# Patient Record
Sex: FEMALE | Race: WHITE | NOT HISPANIC OR LATINO | Employment: OTHER | URBAN - METROPOLITAN AREA
[De-identification: names, ages, dates, MRNs, and addresses within clinical notes are randomized per-mention and may not be internally consistent; named-entity substitution may affect disease eponyms.]

---

## 2019-04-01 ENCOUNTER — SURGERY - HEALTHEAST (OUTPATIENT)
Dept: CARDIOLOGY | Facility: CLINIC | Age: 64
End: 2019-04-01

## 2019-04-01 ASSESSMENT — MIFFLIN-ST. JEOR
SCORE: 1111.95
SCORE: 1111.95

## 2019-04-02 ENCOUNTER — SURGERY - HEALTHEAST (OUTPATIENT)
Dept: CARDIOLOGY | Facility: CLINIC | Age: 64
End: 2019-04-02

## 2019-04-10 ENCOUNTER — AMBULATORY - HEALTHEAST (OUTPATIENT)
Dept: CARDIAC REHAB | Facility: CLINIC | Age: 64
End: 2019-04-10

## 2019-04-10 DIAGNOSIS — Z95.5 S/P CORONARY ARTERY STENT PLACEMENT: ICD-10-CM

## 2019-04-11 ENCOUNTER — COMMUNICATION - HEALTHEAST (OUTPATIENT)
Dept: VASCULAR SURGERY | Facility: CLINIC | Age: 64
End: 2019-04-11

## 2019-04-12 ENCOUNTER — AMBULATORY - HEALTHEAST (OUTPATIENT)
Dept: CARDIAC REHAB | Facility: CLINIC | Age: 64
End: 2019-04-12

## 2019-04-12 DIAGNOSIS — Z95.5 S/P CORONARY ARTERY STENT PLACEMENT: ICD-10-CM

## 2019-04-15 ENCOUNTER — AMBULATORY - HEALTHEAST (OUTPATIENT)
Dept: CARDIAC REHAB | Facility: CLINIC | Age: 64
End: 2019-04-15

## 2019-04-15 DIAGNOSIS — Z95.5 S/P CORONARY ARTERY STENT PLACEMENT: ICD-10-CM

## 2019-04-16 ENCOUNTER — OFFICE VISIT - HEALTHEAST (OUTPATIENT)
Dept: CARDIOLOGY | Facility: CLINIC | Age: 64
End: 2019-04-16

## 2019-04-16 DIAGNOSIS — I21.4 NSTEMI (NON-ST ELEVATED MYOCARDIAL INFARCTION) (H): ICD-10-CM

## 2019-04-16 DIAGNOSIS — E78.00 HYPERCHOLESTEREMIA: ICD-10-CM

## 2019-04-16 DIAGNOSIS — I10 ESSENTIAL HYPERTENSION: ICD-10-CM

## 2019-04-16 DIAGNOSIS — I25.10 CORONARY ARTERY DISEASE DUE TO LIPID RICH PLAQUE: ICD-10-CM

## 2019-04-16 DIAGNOSIS — I25.83 CORONARY ARTERY DISEASE DUE TO LIPID RICH PLAQUE: ICD-10-CM

## 2019-04-16 ASSESSMENT — MIFFLIN-ST. JEOR: SCORE: 1084.74

## 2019-04-17 ENCOUNTER — COMMUNICATION - HEALTHEAST (OUTPATIENT)
Dept: CARDIOLOGY | Facility: CLINIC | Age: 64
End: 2019-04-17

## 2019-04-18 ENCOUNTER — COMMUNICATION - HEALTHEAST (OUTPATIENT)
Dept: VASCULAR SURGERY | Facility: CLINIC | Age: 64
End: 2019-04-18

## 2019-04-22 ENCOUNTER — AMBULATORY - HEALTHEAST (OUTPATIENT)
Dept: CARDIAC REHAB | Facility: CLINIC | Age: 64
End: 2019-04-22

## 2019-04-22 DIAGNOSIS — Z95.5 S/P CORONARY ARTERY STENT PLACEMENT: ICD-10-CM

## 2019-04-26 ENCOUNTER — AMBULATORY - HEALTHEAST (OUTPATIENT)
Dept: CARDIAC REHAB | Facility: CLINIC | Age: 64
End: 2019-04-26

## 2019-04-26 DIAGNOSIS — Z95.5 S/P CORONARY ARTERY STENT PLACEMENT: ICD-10-CM

## 2019-04-29 ENCOUNTER — COMMUNICATION - HEALTHEAST (OUTPATIENT)
Dept: CARDIOLOGY | Facility: CLINIC | Age: 64
End: 2019-04-29

## 2019-04-29 DIAGNOSIS — E78.00 HYPERCHOLESTEREMIA: ICD-10-CM

## 2019-04-29 DIAGNOSIS — I25.10 CORONARY ARTERY DISEASE DUE TO LIPID RICH PLAQUE: ICD-10-CM

## 2019-04-29 DIAGNOSIS — I21.4 NSTEMI (NON-ST ELEVATED MYOCARDIAL INFARCTION) (H): ICD-10-CM

## 2019-04-29 DIAGNOSIS — I25.83 CORONARY ARTERY DISEASE DUE TO LIPID RICH PLAQUE: ICD-10-CM

## 2019-04-30 ENCOUNTER — COMMUNICATION - HEALTHEAST (OUTPATIENT)
Dept: CARDIOLOGY | Facility: CLINIC | Age: 64
End: 2019-04-30

## 2019-05-03 ENCOUNTER — AMBULATORY - HEALTHEAST (OUTPATIENT)
Dept: CARDIAC REHAB | Facility: CLINIC | Age: 64
End: 2019-05-03

## 2019-05-03 DIAGNOSIS — Z95.5 S/P CORONARY ARTERY STENT PLACEMENT: ICD-10-CM

## 2019-05-06 ENCOUNTER — AMBULATORY - HEALTHEAST (OUTPATIENT)
Dept: CARDIAC REHAB | Facility: CLINIC | Age: 64
End: 2019-05-06

## 2019-05-06 DIAGNOSIS — Z95.5 S/P CORONARY ARTERY STENT PLACEMENT: ICD-10-CM

## 2019-05-09 ENCOUNTER — OFFICE VISIT - HEALTHEAST (OUTPATIENT)
Dept: CARDIOLOGY | Facility: CLINIC | Age: 64
End: 2019-05-09

## 2019-05-09 DIAGNOSIS — I77.9 CAROTID DISEASE, BILATERAL (H): ICD-10-CM

## 2019-05-09 DIAGNOSIS — I25.10 CORONARY ARTERY DISEASE INVOLVING NATIVE CORONARY ARTERY OF NATIVE HEART WITHOUT ANGINA PECTORIS: ICD-10-CM

## 2019-05-09 ASSESSMENT — MIFFLIN-ST. JEOR: SCORE: 1093.81

## 2019-05-10 ENCOUNTER — AMBULATORY - HEALTHEAST (OUTPATIENT)
Dept: CARDIAC REHAB | Facility: CLINIC | Age: 64
End: 2019-05-10

## 2019-05-10 ENCOUNTER — RECORDS - HEALTHEAST (OUTPATIENT)
Dept: ADMINISTRATIVE | Facility: OTHER | Age: 64
End: 2019-05-10

## 2019-05-10 DIAGNOSIS — Z95.5 S/P CORONARY ARTERY STENT PLACEMENT: ICD-10-CM

## 2019-05-13 ENCOUNTER — AMBULATORY - HEALTHEAST (OUTPATIENT)
Dept: CARDIAC REHAB | Facility: CLINIC | Age: 64
End: 2019-05-13

## 2019-05-13 DIAGNOSIS — Z95.5 S/P CORONARY ARTERY STENT PLACEMENT: ICD-10-CM

## 2019-05-15 ENCOUNTER — COMMUNICATION - HEALTHEAST (OUTPATIENT)
Dept: CARDIOLOGY | Facility: CLINIC | Age: 64
End: 2019-05-15

## 2019-05-17 ENCOUNTER — COMMUNICATION - HEALTHEAST (OUTPATIENT)
Dept: CARDIOLOGY | Facility: CLINIC | Age: 64
End: 2019-05-17

## 2019-05-17 ENCOUNTER — AMBULATORY - HEALTHEAST (OUTPATIENT)
Dept: CARDIAC REHAB | Facility: CLINIC | Age: 64
End: 2019-05-17

## 2019-05-17 DIAGNOSIS — Z95.5 S/P CORONARY ARTERY STENT PLACEMENT: ICD-10-CM

## 2019-05-20 ENCOUNTER — COMMUNICATION - HEALTHEAST (OUTPATIENT)
Dept: CARDIOLOGY | Facility: CLINIC | Age: 64
End: 2019-05-20

## 2019-05-22 ENCOUNTER — COMMUNICATION - HEALTHEAST (OUTPATIENT)
Dept: SCHEDULING | Facility: CLINIC | Age: 64
End: 2019-05-22

## 2019-05-22 ENCOUNTER — AMBULATORY - HEALTHEAST (OUTPATIENT)
Dept: CARDIAC REHAB | Facility: CLINIC | Age: 64
End: 2019-05-22

## 2019-05-22 ENCOUNTER — COMMUNICATION - HEALTHEAST (OUTPATIENT)
Dept: CARDIOLOGY | Facility: CLINIC | Age: 64
End: 2019-05-22

## 2019-05-22 DIAGNOSIS — Z95.5 S/P CORONARY ARTERY STENT PLACEMENT: ICD-10-CM

## 2019-05-24 ENCOUNTER — HOSPITAL ENCOUNTER (OUTPATIENT)
Dept: CT IMAGING | Facility: CLINIC | Age: 64
Discharge: HOME | End: 2019-05-24
Attending: INTERNAL MEDICINE

## 2019-05-24 LAB
CREAT BLD-MCNC: 0.7 MG/DL (ref 0.6–1.1)
GFR SERPL CREATININE-BSD FRML MDRD: >60 ML/MIN/1.73M2

## 2019-05-31 ENCOUNTER — AMBULATORY - HEALTHEAST (OUTPATIENT)
Dept: CARDIAC REHAB | Facility: CLINIC | Age: 64
End: 2019-05-31

## 2019-05-31 DIAGNOSIS — Z95.5 S/P CORONARY ARTERY STENT PLACEMENT: ICD-10-CM

## 2019-06-03 ENCOUNTER — AMBULATORY - HEALTHEAST (OUTPATIENT)
Dept: CARDIAC REHAB | Facility: CLINIC | Age: 64
End: 2019-06-03

## 2019-06-03 DIAGNOSIS — Z95.5 S/P CORONARY ARTERY STENT PLACEMENT: ICD-10-CM

## 2019-06-07 ENCOUNTER — AMBULATORY - HEALTHEAST (OUTPATIENT)
Dept: CARDIAC REHAB | Facility: CLINIC | Age: 64
End: 2019-06-07

## 2019-06-07 DIAGNOSIS — Z95.5 S/P CORONARY ARTERY STENT PLACEMENT: ICD-10-CM

## 2019-06-10 ENCOUNTER — AMBULATORY - HEALTHEAST (OUTPATIENT)
Dept: CARDIAC REHAB | Facility: CLINIC | Age: 64
End: 2019-06-10

## 2019-06-10 DIAGNOSIS — Z95.5 S/P CORONARY ARTERY STENT PLACEMENT: ICD-10-CM

## 2019-06-12 ENCOUNTER — AMBULATORY - HEALTHEAST (OUTPATIENT)
Dept: CARDIAC REHAB | Facility: CLINIC | Age: 64
End: 2019-06-12

## 2019-06-12 DIAGNOSIS — Z95.5 S/P CORONARY ARTERY STENT PLACEMENT: ICD-10-CM

## 2019-06-19 ENCOUNTER — AMBULATORY - HEALTHEAST (OUTPATIENT)
Dept: CARDIAC REHAB | Facility: CLINIC | Age: 64
End: 2019-06-19

## 2019-06-19 DIAGNOSIS — Z95.5 S/P CORONARY ARTERY STENT PLACEMENT: ICD-10-CM

## 2019-06-24 ENCOUNTER — AMBULATORY - HEALTHEAST (OUTPATIENT)
Dept: CARDIAC REHAB | Facility: CLINIC | Age: 64
End: 2019-06-24

## 2019-06-24 DIAGNOSIS — Z95.5 S/P CORONARY ARTERY STENT PLACEMENT: ICD-10-CM

## 2019-06-26 ENCOUNTER — AMBULATORY - HEALTHEAST (OUTPATIENT)
Dept: CARDIAC REHAB | Facility: CLINIC | Age: 64
End: 2019-06-26

## 2019-06-26 DIAGNOSIS — Z95.5 S/P CORONARY ARTERY STENT PLACEMENT: ICD-10-CM

## 2019-07-01 ENCOUNTER — AMBULATORY - HEALTHEAST (OUTPATIENT)
Dept: CARDIAC REHAB | Facility: CLINIC | Age: 64
End: 2019-07-01

## 2019-07-01 DIAGNOSIS — Z95.5 S/P CORONARY ARTERY STENT PLACEMENT: ICD-10-CM

## 2019-07-08 ENCOUNTER — AMBULATORY - HEALTHEAST (OUTPATIENT)
Dept: CARDIAC REHAB | Facility: CLINIC | Age: 64
End: 2019-07-08

## 2019-07-08 DIAGNOSIS — Z95.5 S/P CORONARY ARTERY STENT PLACEMENT: ICD-10-CM

## 2019-07-10 ENCOUNTER — AMBULATORY - HEALTHEAST (OUTPATIENT)
Dept: CARDIAC REHAB | Facility: CLINIC | Age: 64
End: 2019-07-10

## 2019-07-10 DIAGNOSIS — Z95.5 S/P CORONARY ARTERY STENT PLACEMENT: ICD-10-CM

## 2019-07-16 ENCOUNTER — AMBULATORY - HEALTHEAST (OUTPATIENT)
Dept: CARDIAC REHAB | Facility: CLINIC | Age: 64
End: 2019-07-16

## 2019-07-16 DIAGNOSIS — Z95.5 S/P CORONARY ARTERY STENT PLACEMENT: ICD-10-CM

## 2019-07-17 ENCOUNTER — AMBULATORY - HEALTHEAST (OUTPATIENT)
Dept: CARDIAC REHAB | Facility: CLINIC | Age: 64
End: 2019-07-17

## 2019-07-17 DIAGNOSIS — Z95.5 S/P CORONARY ARTERY STENT PLACEMENT: ICD-10-CM

## 2019-07-18 ENCOUNTER — OFFICE VISIT - HEALTHEAST (OUTPATIENT)
Dept: CARDIOLOGY | Facility: CLINIC | Age: 64
End: 2019-07-18

## 2019-07-18 ENCOUNTER — COMMUNICATION - HEALTHEAST (OUTPATIENT)
Dept: CARDIOLOGY | Facility: CLINIC | Age: 64
End: 2019-07-18

## 2019-07-18 ENCOUNTER — AMBULATORY - HEALTHEAST (OUTPATIENT)
Dept: CARDIAC REHAB | Facility: CLINIC | Age: 64
End: 2019-07-18

## 2019-07-18 DIAGNOSIS — I25.10 CORONARY ARTERY DISEASE INVOLVING NATIVE CORONARY ARTERY OF NATIVE HEART WITHOUT ANGINA PECTORIS: ICD-10-CM

## 2019-07-18 DIAGNOSIS — Z95.5 S/P CORONARY ARTERY STENT PLACEMENT: ICD-10-CM

## 2019-07-18 ASSESSMENT — MIFFLIN-ST. JEOR: SCORE: 1084.74

## 2019-07-23 ENCOUNTER — AMBULATORY - HEALTHEAST (OUTPATIENT)
Dept: CARDIAC REHAB | Facility: CLINIC | Age: 64
End: 2019-07-23

## 2019-07-23 DIAGNOSIS — Z95.5 S/P CORONARY ARTERY STENT PLACEMENT: ICD-10-CM

## 2019-07-24 ENCOUNTER — AMBULATORY - HEALTHEAST (OUTPATIENT)
Dept: CARDIAC REHAB | Facility: CLINIC | Age: 64
End: 2019-07-24

## 2019-07-24 DIAGNOSIS — Z95.5 S/P CORONARY ARTERY STENT PLACEMENT: ICD-10-CM

## 2019-07-25 ENCOUNTER — AMBULATORY - HEALTHEAST (OUTPATIENT)
Dept: CARDIAC REHAB | Facility: CLINIC | Age: 64
End: 2019-07-25

## 2019-07-25 DIAGNOSIS — Z95.5 S/P CORONARY ARTERY STENT PLACEMENT: ICD-10-CM

## 2019-07-26 ENCOUNTER — COMMUNICATION - HEALTHEAST (OUTPATIENT)
Dept: CARDIOLOGY | Facility: CLINIC | Age: 64
End: 2019-07-26

## 2019-07-26 DIAGNOSIS — I25.10 CORONARY ARTERY DISEASE INVOLVING NATIVE CORONARY ARTERY OF NATIVE HEART WITHOUT ANGINA PECTORIS: ICD-10-CM

## 2019-08-06 ENCOUNTER — AMBULATORY - HEALTHEAST (OUTPATIENT)
Dept: CARDIAC REHAB | Facility: CLINIC | Age: 64
End: 2019-08-06

## 2019-08-06 DIAGNOSIS — Z95.5 S/P CORONARY ARTERY STENT PLACEMENT: ICD-10-CM

## 2019-08-07 ENCOUNTER — AMBULATORY - HEALTHEAST (OUTPATIENT)
Dept: CARDIAC REHAB | Facility: CLINIC | Age: 64
End: 2019-08-07

## 2019-08-07 DIAGNOSIS — Z95.5 S/P CORONARY ARTERY STENT PLACEMENT: ICD-10-CM

## 2019-08-08 ENCOUNTER — AMBULATORY - HEALTHEAST (OUTPATIENT)
Dept: CARDIAC REHAB | Facility: CLINIC | Age: 64
End: 2019-08-08

## 2019-08-08 DIAGNOSIS — Z95.5 S/P CORONARY ARTERY STENT PLACEMENT: ICD-10-CM

## 2019-08-22 ENCOUNTER — SURGERY - HEALTHEAST (OUTPATIENT)
Dept: CARDIOLOGY | Facility: CLINIC | Age: 64
End: 2019-08-22

## 2019-08-23 ENCOUNTER — SURGERY - HEALTHEAST (OUTPATIENT)
Dept: CARDIOLOGY | Facility: CLINIC | Age: 64
End: 2019-08-23

## 2019-08-23 ENCOUNTER — ANESTHESIA - HEALTHEAST (OUTPATIENT)
Dept: SURGERY | Facility: CLINIC | Age: 64
End: 2019-08-23

## 2019-08-23 DIAGNOSIS — Z01.818 PRE-OP EXAM: ICD-10-CM

## 2019-08-23 ASSESSMENT — MIFFLIN-ST. JEOR
SCORE: 1073.4
SCORE: 1073.4

## 2019-08-24 ASSESSMENT — MIFFLIN-ST. JEOR
SCORE: 1072.49
SCORE: 1072.49

## 2019-08-25 ENCOUNTER — SURGERY - HEALTHEAST (OUTPATIENT)
Dept: CARDIOLOGY | Facility: CLINIC | Age: 64
End: 2019-08-25

## 2019-08-25 DIAGNOSIS — Z01.818 PRE-OP EVALUATION: ICD-10-CM

## 2019-08-25 ASSESSMENT — MIFFLIN-ST. JEOR
SCORE: 1072.04
SCORE: 1072.04

## 2019-08-26 ENCOUNTER — SURGERY - HEALTHEAST (OUTPATIENT)
Dept: SURGERY | Facility: CLINIC | Age: 64
End: 2019-08-26

## 2019-08-26 ASSESSMENT — MIFFLIN-ST. JEOR
SCORE: 1066.14
SCORE: 1066.14

## 2019-08-27 ASSESSMENT — MIFFLIN-ST. JEOR
SCORE: 1101.07
SCORE: 1101.07

## 2019-08-28 ASSESSMENT — MIFFLIN-ST. JEOR
SCORE: 1096.08
SCORE: 1096.08

## 2019-08-29 ASSESSMENT — MIFFLIN-ST. JEOR
SCORE: 1084.28
SCORE: 1084.28

## 2019-08-30 ASSESSMENT — MIFFLIN-ST. JEOR
SCORE: 1078.39
SCORE: 1078.39

## 2019-09-05 ENCOUNTER — COMMUNICATION - HEALTHEAST (OUTPATIENT)
Dept: CARDIOLOGY | Facility: CLINIC | Age: 64
End: 2019-09-05

## 2019-09-11 ENCOUNTER — COMMUNICATION - HEALTHEAST (OUTPATIENT)
Dept: CARDIOLOGY | Facility: CLINIC | Age: 64
End: 2019-09-11

## 2019-09-17 ENCOUNTER — OFFICE VISIT - HEALTHEAST (OUTPATIENT)
Dept: CARDIOLOGY | Facility: CLINIC | Age: 64
End: 2019-09-17

## 2019-09-17 DIAGNOSIS — Z95.1 S/P CORONARY ARTERY BYPASS GRAFT X 3: ICD-10-CM

## 2019-09-17 ASSESSMENT — MIFFLIN-ST. JEOR: SCORE: 1061.6

## 2019-09-18 ENCOUNTER — COMMUNICATION - HEALTHEAST (OUTPATIENT)
Dept: CARDIOLOGY | Facility: CLINIC | Age: 64
End: 2019-09-18

## 2019-09-19 ENCOUNTER — COMMUNICATION - HEALTHEAST (OUTPATIENT)
Dept: CARDIOLOGY | Facility: CLINIC | Age: 64
End: 2019-09-19

## 2019-09-21 ENCOUNTER — COMMUNICATION - HEALTHEAST (OUTPATIENT)
Dept: CARDIOLOGY | Facility: CLINIC | Age: 64
End: 2019-09-21

## 2019-09-24 ENCOUNTER — COMMUNICATION - HEALTHEAST (OUTPATIENT)
Dept: CARDIOLOGY | Facility: CLINIC | Age: 64
End: 2019-09-24

## 2019-09-25 ENCOUNTER — COMMUNICATION - HEALTHEAST (OUTPATIENT)
Dept: CARDIOLOGY | Facility: CLINIC | Age: 64
End: 2019-09-25

## 2019-09-25 ENCOUNTER — AMBULATORY - HEALTHEAST (OUTPATIENT)
Dept: CARDIAC REHAB | Facility: CLINIC | Age: 64
End: 2019-09-25

## 2019-09-25 DIAGNOSIS — Z95.1 S/P CABG (CORONARY ARTERY BYPASS GRAFT): ICD-10-CM

## 2019-09-30 ENCOUNTER — AMBULATORY - HEALTHEAST (OUTPATIENT)
Dept: CARDIAC REHAB | Facility: CLINIC | Age: 64
End: 2019-09-30

## 2019-09-30 DIAGNOSIS — Z95.1 S/P CABG (CORONARY ARTERY BYPASS GRAFT): ICD-10-CM

## 2019-10-04 ENCOUNTER — OFFICE VISIT - HEALTHEAST (OUTPATIENT)
Dept: CARDIOLOGY | Facility: CLINIC | Age: 64
End: 2019-10-04

## 2019-10-04 ENCOUNTER — AMBULATORY - HEALTHEAST (OUTPATIENT)
Dept: CARDIAC REHAB | Facility: CLINIC | Age: 64
End: 2019-10-04

## 2019-10-04 DIAGNOSIS — Z95.1 S/P CABG (CORONARY ARTERY BYPASS GRAFT): ICD-10-CM

## 2019-10-04 DIAGNOSIS — I25.10 CORONARY ARTERY DISEASE INVOLVING NATIVE CORONARY ARTERY OF NATIVE HEART WITHOUT ANGINA PECTORIS: ICD-10-CM

## 2019-10-04 DIAGNOSIS — Z95.1 S/P CORONARY ARTERY BYPASS GRAFT X 3: ICD-10-CM

## 2019-10-04 ASSESSMENT — MIFFLIN-ST. JEOR: SCORE: 1071.13

## 2019-10-08 ENCOUNTER — COMMUNICATION - HEALTHEAST (OUTPATIENT)
Dept: CARDIOLOGY | Facility: CLINIC | Age: 64
End: 2019-10-08

## 2019-10-08 ENCOUNTER — AMBULATORY - HEALTHEAST (OUTPATIENT)
Dept: CARDIAC REHAB | Facility: CLINIC | Age: 64
End: 2019-10-08

## 2019-10-08 DIAGNOSIS — I21.4 NSTEMI (NON-ST ELEVATED MYOCARDIAL INFARCTION) (H): ICD-10-CM

## 2019-10-08 DIAGNOSIS — Z95.1 S/P CABG (CORONARY ARTERY BYPASS GRAFT): ICD-10-CM

## 2019-10-15 ENCOUNTER — AMBULATORY - HEALTHEAST (OUTPATIENT)
Dept: CARDIOLOGY | Facility: CLINIC | Age: 64
End: 2019-10-15

## 2019-10-15 ENCOUNTER — AMBULATORY - HEALTHEAST (OUTPATIENT)
Dept: CARDIAC REHAB | Facility: CLINIC | Age: 64
End: 2019-10-15

## 2019-10-15 DIAGNOSIS — Z95.1 S/P CABG (CORONARY ARTERY BYPASS GRAFT): ICD-10-CM

## 2019-10-15 DIAGNOSIS — I25.10 CORONARY ARTERY DISEASE INVOLVING NATIVE CORONARY ARTERY OF NATIVE HEART WITHOUT ANGINA PECTORIS: ICD-10-CM

## 2019-10-17 ENCOUNTER — AMBULATORY - HEALTHEAST (OUTPATIENT)
Dept: CARDIAC REHAB | Facility: CLINIC | Age: 64
End: 2019-10-17

## 2019-10-17 DIAGNOSIS — I25.10 CAD (CORONARY ARTERY DISEASE): ICD-10-CM

## 2019-10-19 ENCOUNTER — COMMUNICATION - HEALTHEAST (OUTPATIENT)
Dept: CARDIOLOGY | Facility: CLINIC | Age: 64
End: 2019-10-19

## 2019-10-21 ENCOUNTER — AMBULATORY - HEALTHEAST (OUTPATIENT)
Dept: CARDIOLOGY | Facility: CLINIC | Age: 64
End: 2019-10-21

## 2019-10-22 ENCOUNTER — COMMUNICATION - HEALTHEAST (OUTPATIENT)
Dept: CARDIOLOGY | Facility: CLINIC | Age: 64
End: 2019-10-22

## 2019-10-24 ENCOUNTER — COMMUNICATION - HEALTHEAST (OUTPATIENT)
Dept: CARDIOLOGY | Facility: CLINIC | Age: 64
End: 2019-10-24

## 2019-11-05 ENCOUNTER — RECORDS - HEALTHEAST (OUTPATIENT)
Dept: ADMINISTRATIVE | Facility: OTHER | Age: 64
End: 2019-11-05

## 2019-11-26 ENCOUNTER — COMMUNICATION - HEALTHEAST (OUTPATIENT)
Dept: CARDIOLOGY | Facility: CLINIC | Age: 64
End: 2019-11-26

## 2019-11-26 DIAGNOSIS — I21.4 NSTEMI (NON-ST ELEVATED MYOCARDIAL INFARCTION) (H): ICD-10-CM

## 2019-11-26 DIAGNOSIS — I25.83 CORONARY ARTERY DISEASE DUE TO LIPID RICH PLAQUE: ICD-10-CM

## 2019-11-26 DIAGNOSIS — I25.10 CORONARY ARTERY DISEASE DUE TO LIPID RICH PLAQUE: ICD-10-CM

## 2019-12-30 ENCOUNTER — OFFICE VISIT - HEALTHEAST (OUTPATIENT)
Dept: CARDIOLOGY | Facility: CLINIC | Age: 64
End: 2019-12-30

## 2019-12-30 DIAGNOSIS — R09.89 CAROTID BRUIT: ICD-10-CM

## 2019-12-30 ASSESSMENT — MIFFLIN-ST. JEOR: SCORE: 1064.78

## 2020-03-03 ENCOUNTER — COMMUNICATION - HEALTHEAST (OUTPATIENT)
Dept: CARDIOLOGY | Facility: CLINIC | Age: 65
End: 2020-03-03

## 2020-03-15 ENCOUNTER — COMMUNICATION - HEALTHEAST (OUTPATIENT)
Dept: CARDIOLOGY | Facility: CLINIC | Age: 65
End: 2020-03-15

## 2020-04-23 ASSESSMENT — MIFFLIN-ST. JEOR
SCORE: 1079.75
SCORE: 1084.74

## 2020-04-24 ENCOUNTER — SURGERY - HEALTHEAST (OUTPATIENT)
Dept: CARDIOLOGY | Facility: CLINIC | Age: 65
End: 2020-04-24

## 2020-04-24 ASSESSMENT — MIFFLIN-ST. JEOR: SCORE: 1081.56

## 2020-04-25 ASSESSMENT — MIFFLIN-ST. JEOR: SCORE: 1085.19

## 2020-04-26 ENCOUNTER — COMMUNICATION - HEALTHEAST (OUTPATIENT)
Dept: CARDIOLOGY | Facility: CLINIC | Age: 65
End: 2020-04-26

## 2020-04-28 ENCOUNTER — COMMUNICATION - HEALTHEAST (OUTPATIENT)
Dept: PHARMACY | Facility: CLINIC | Age: 65
End: 2020-04-28

## 2020-04-28 ENCOUNTER — AMBULATORY - HEALTHEAST (OUTPATIENT)
Dept: PHARMACY | Facility: CLINIC | Age: 65
End: 2020-04-28

## 2020-04-28 DIAGNOSIS — I25.119 CORONARY ARTERY DISEASE INVOLVING NATIVE CORONARY ARTERY OF NATIVE HEART WITH ANGINA PECTORIS (H): ICD-10-CM

## 2020-04-28 DIAGNOSIS — I25.110 CORONARY ARTERY DISEASE INVOLVING NATIVE CORONARY ARTERY OF NATIVE HEART WITH UNSTABLE ANGINA PECTORIS (H): ICD-10-CM

## 2020-04-28 DIAGNOSIS — E78.00 PURE HYPERCHOLESTEROLEMIA: ICD-10-CM

## 2020-05-04 ENCOUNTER — OFFICE VISIT - HEALTHEAST (OUTPATIENT)
Dept: CARDIOLOGY | Facility: CLINIC | Age: 65
End: 2020-05-04

## 2020-05-04 DIAGNOSIS — I25.10 CORONARY ARTERY DISEASE INVOLVING NATIVE CORONARY ARTERY OF NATIVE HEART WITHOUT ANGINA PECTORIS: ICD-10-CM

## 2020-05-05 ENCOUNTER — COMMUNICATION - HEALTHEAST (OUTPATIENT)
Dept: CARDIOLOGY | Facility: CLINIC | Age: 65
End: 2020-05-05

## 2020-05-06 ENCOUNTER — COMMUNICATION - HEALTHEAST (OUTPATIENT)
Dept: CARDIOLOGY | Facility: CLINIC | Age: 65
End: 2020-05-06

## 2020-05-07 ENCOUNTER — COMMUNICATION - HEALTHEAST (OUTPATIENT)
Dept: CARDIOLOGY | Facility: CLINIC | Age: 65
End: 2020-05-07

## 2020-05-08 ENCOUNTER — COMMUNICATION - HEALTHEAST (OUTPATIENT)
Dept: CARDIOLOGY | Facility: CLINIC | Age: 65
End: 2020-05-08

## 2020-05-18 ENCOUNTER — COMMUNICATION - HEALTHEAST (OUTPATIENT)
Dept: CARDIOLOGY | Facility: CLINIC | Age: 65
End: 2020-05-18

## 2020-05-21 ENCOUNTER — COMMUNICATION - HEALTHEAST (OUTPATIENT)
Dept: CARDIOLOGY | Facility: CLINIC | Age: 65
End: 2020-05-21

## 2020-05-26 ENCOUNTER — COMMUNICATION - HEALTHEAST (OUTPATIENT)
Dept: CARDIOLOGY | Facility: CLINIC | Age: 65
End: 2020-05-26

## 2020-05-27 ENCOUNTER — COMMUNICATION - HEALTHEAST (OUTPATIENT)
Dept: CARDIOLOGY | Facility: CLINIC | Age: 65
End: 2020-05-27

## 2020-05-28 ENCOUNTER — COMMUNICATION - HEALTHEAST (OUTPATIENT)
Dept: CARDIOLOGY | Facility: CLINIC | Age: 65
End: 2020-05-28

## 2020-06-02 ENCOUNTER — OFFICE VISIT - HEALTHEAST (OUTPATIENT)
Dept: CARDIOLOGY | Facility: CLINIC | Age: 65
End: 2020-06-02

## 2020-06-02 DIAGNOSIS — I10 ESSENTIAL HYPERTENSION: ICD-10-CM

## 2020-06-02 DIAGNOSIS — E78.00 PURE HYPERCHOLESTEROLEMIA: ICD-10-CM

## 2020-06-02 DIAGNOSIS — I65.23 BILATERAL CAROTID ARTERY STENOSIS: ICD-10-CM

## 2020-06-02 DIAGNOSIS — I25.119 CORONARY ARTERY DISEASE INVOLVING NATIVE CORONARY ARTERY OF NATIVE HEART WITH ANGINA PECTORIS (H): ICD-10-CM

## 2020-06-03 ENCOUNTER — COMMUNICATION - HEALTHEAST (OUTPATIENT)
Dept: CARDIOLOGY | Facility: CLINIC | Age: 65
End: 2020-06-03

## 2020-06-08 ENCOUNTER — COMMUNICATION - HEALTHEAST (OUTPATIENT)
Dept: CARDIOLOGY | Facility: CLINIC | Age: 65
End: 2020-06-08

## 2020-06-09 ENCOUNTER — COMMUNICATION - HEALTHEAST (OUTPATIENT)
Dept: CARDIOLOGY | Facility: CLINIC | Age: 65
End: 2020-06-09

## 2020-06-09 DIAGNOSIS — I25.119 CORONARY ARTERY DISEASE INVOLVING NATIVE CORONARY ARTERY OF NATIVE HEART WITH ANGINA PECTORIS (H): ICD-10-CM

## 2020-06-09 DIAGNOSIS — I25.10 CORONARY ARTERY DISEASE INVOLVING NATIVE CORONARY ARTERY OF NATIVE HEART WITHOUT ANGINA PECTORIS: ICD-10-CM

## 2020-06-09 DIAGNOSIS — I10 ESSENTIAL HYPERTENSION: ICD-10-CM

## 2020-06-10 ENCOUNTER — COMMUNICATION - HEALTHEAST (OUTPATIENT)
Dept: CARDIOLOGY | Facility: CLINIC | Age: 65
End: 2020-06-10

## 2020-06-11 ENCOUNTER — COMMUNICATION - HEALTHEAST (OUTPATIENT)
Dept: CARDIOLOGY | Facility: CLINIC | Age: 65
End: 2020-06-11

## 2020-06-17 ENCOUNTER — OFFICE VISIT - HEALTHEAST (OUTPATIENT)
Dept: CARDIOLOGY | Facility: CLINIC | Age: 65
End: 2020-06-17

## 2020-06-17 DIAGNOSIS — E78.00 PURE HYPERCHOLESTEROLEMIA: ICD-10-CM

## 2020-06-17 DIAGNOSIS — I10 ESSENTIAL HYPERTENSION: ICD-10-CM

## 2020-06-17 DIAGNOSIS — Z95.1 S/P CABG X 3: ICD-10-CM

## 2020-06-17 DIAGNOSIS — I25.119 CORONARY ARTERY DISEASE INVOLVING NATIVE CORONARY ARTERY OF NATIVE HEART WITH ANGINA PECTORIS (H): ICD-10-CM

## 2020-06-17 DIAGNOSIS — R07.89 ATYPICAL CHEST PAIN: ICD-10-CM

## 2020-06-17 ASSESSMENT — MIFFLIN-ST. JEOR: SCORE: 1077.93

## 2020-06-26 ENCOUNTER — COMMUNICATION - HEALTHEAST (OUTPATIENT)
Dept: CARDIOLOGY | Facility: CLINIC | Age: 65
End: 2020-06-26

## 2020-06-29 ENCOUNTER — COMMUNICATION - HEALTHEAST (OUTPATIENT)
Dept: CARDIOLOGY | Facility: CLINIC | Age: 65
End: 2020-06-29

## 2020-07-02 ENCOUNTER — COMMUNICATION - HEALTHEAST (OUTPATIENT)
Dept: CARDIOLOGY | Facility: CLINIC | Age: 65
End: 2020-07-02

## 2020-07-07 ENCOUNTER — COMMUNICATION - HEALTHEAST (OUTPATIENT)
Dept: CARDIOLOGY | Facility: CLINIC | Age: 65
End: 2020-07-07

## 2020-07-08 ENCOUNTER — OFFICE VISIT - HEALTHEAST (OUTPATIENT)
Dept: CARDIOLOGY | Facility: CLINIC | Age: 65
End: 2020-07-08

## 2020-07-08 DIAGNOSIS — I10 ESSENTIAL HYPERTENSION: ICD-10-CM

## 2020-07-08 DIAGNOSIS — I21.4 NSTEMI (NON-ST ELEVATED MYOCARDIAL INFARCTION) (H): ICD-10-CM

## 2020-07-08 DIAGNOSIS — E78.00 PURE HYPERCHOLESTEROLEMIA: ICD-10-CM

## 2020-07-08 DIAGNOSIS — R68.89 COLD INTOLERANCE: ICD-10-CM

## 2020-07-08 DIAGNOSIS — I25.119 CORONARY ARTERY DISEASE INVOLVING NATIVE CORONARY ARTERY OF NATIVE HEART WITH ANGINA PECTORIS (H): ICD-10-CM

## 2020-07-08 LAB — TSH SERPL DL<=0.005 MIU/L-ACNC: 3.08 UIU/ML (ref 0.3–5)

## 2020-07-08 ASSESSMENT — MIFFLIN-ST. JEOR: SCORE: 1068.67

## 2020-07-09 ENCOUNTER — COMMUNICATION - HEALTHEAST (OUTPATIENT)
Dept: CARDIOLOGY | Facility: CLINIC | Age: 65
End: 2020-07-09

## 2020-07-28 ENCOUNTER — RECORDS - HEALTHEAST (OUTPATIENT)
Dept: ADMINISTRATIVE | Facility: OTHER | Age: 65
End: 2020-07-28

## 2020-08-03 ENCOUNTER — AMBULATORY - HEALTHEAST (OUTPATIENT)
Dept: CARDIAC REHAB | Facility: CLINIC | Age: 65
End: 2020-08-03

## 2020-08-03 DIAGNOSIS — I21.4 NON-ST ELEVATION MI (NSTEMI) (H): ICD-10-CM

## 2020-08-04 ENCOUNTER — RECORDS - HEALTHEAST (OUTPATIENT)
Dept: ADMINISTRATIVE | Facility: OTHER | Age: 65
End: 2020-08-04

## 2020-08-07 ENCOUNTER — HOSPITAL ENCOUNTER (OUTPATIENT)
Dept: MAMMOGRAPHY | Facility: CLINIC | Age: 65
Discharge: HOME | End: 2020-08-07

## 2020-08-07 ENCOUNTER — HOSPITAL ENCOUNTER (OUTPATIENT)
Dept: ULTRASOUND IMAGING | Facility: CLINIC | Age: 65
Discharge: HOME | End: 2020-08-07

## 2020-08-07 DIAGNOSIS — N64.4 BREAST PAIN, LEFT: ICD-10-CM

## 2020-08-11 ENCOUNTER — HOSPITAL ENCOUNTER (OUTPATIENT)
Dept: CT IMAGING | Facility: CLINIC | Age: 65
Discharge: HOME | End: 2020-08-11

## 2020-08-11 DIAGNOSIS — I65.21 CAROTID STENOSIS, ASYMPTOMATIC, RIGHT: ICD-10-CM

## 2020-08-11 LAB
CREAT BLD-MCNC: 0.7 MG/DL (ref 0.6–1.1)
GFR SERPL CREATININE-BSD FRML MDRD: >60 ML/MIN/1.73M2

## 2020-08-15 ENCOUNTER — COMMUNICATION - HEALTHEAST (OUTPATIENT)
Dept: SCHEDULING | Facility: CLINIC | Age: 65
End: 2020-08-15

## 2020-08-31 ENCOUNTER — COMMUNICATION - HEALTHEAST (OUTPATIENT)
Dept: CARDIOLOGY | Facility: CLINIC | Age: 65
End: 2020-08-31

## 2020-08-31 DIAGNOSIS — Z95.1 S/P CORONARY ARTERY BYPASS GRAFT X 3: ICD-10-CM

## 2020-09-25 ENCOUNTER — RECORDS - HEALTHEAST (OUTPATIENT)
Dept: ADMINISTRATIVE | Facility: OTHER | Age: 65
End: 2020-09-25

## 2020-10-06 ENCOUNTER — RECORDS - HEALTHEAST (OUTPATIENT)
Dept: ADMINISTRATIVE | Facility: OTHER | Age: 65
End: 2020-10-06

## 2020-10-15 ENCOUNTER — HOSPITAL ENCOUNTER (OUTPATIENT)
Dept: ULTRASOUND IMAGING | Facility: CLINIC | Age: 65
Discharge: HOME | End: 2020-10-15

## 2020-10-15 DIAGNOSIS — R93.89 ABNORMAL CT SCAN: ICD-10-CM

## 2020-10-15 DIAGNOSIS — R11.0 CHRONIC NAUSEA: ICD-10-CM

## 2020-10-15 DIAGNOSIS — R63.4 WEIGHT LOSS: ICD-10-CM

## 2020-10-23 ENCOUNTER — HOSPITAL ENCOUNTER (OUTPATIENT)
Dept: NUCLEAR MEDICINE | Facility: CLINIC | Age: 65
Discharge: HOME | End: 2020-10-23

## 2020-10-23 DIAGNOSIS — R11.0 CHRONIC NAUSEA: ICD-10-CM

## 2020-10-29 ENCOUNTER — HOSPITAL ENCOUNTER (OUTPATIENT)
Dept: CT IMAGING | Facility: CLINIC | Age: 65
Discharge: HOME | End: 2020-10-29

## 2020-10-29 DIAGNOSIS — R11.0 CHRONIC NAUSEA: ICD-10-CM

## 2020-11-04 ENCOUNTER — TRANSFERRED RECORDS (OUTPATIENT)
Dept: HEALTH INFORMATION MANAGEMENT | Facility: CLINIC | Age: 65
End: 2020-11-04

## 2020-11-17 ENCOUNTER — DOCUMENTATION ONLY (OUTPATIENT)
Dept: CARE COORDINATION | Facility: CLINIC | Age: 65
End: 2020-11-17

## 2020-11-17 NOTE — TELEPHONE ENCOUNTER
RECORDS RECEIVED FROM: Self   Date of Appt: 12/11/20   NOTES (FOR ALL VISITS) STATUS DETAILS   OFFICE NOTE from referring provider N/A    OFFICE NOTE from other specialist Received  Dr Nesha Ace @ [facility]:  11/4/20    Dr Sai Wood @ [facility]:  9/24/20   DISCHARGE SUMMARY from hospital N/A    DISCHARGE REPORT from the ER N/A    OPERATIVE REPORT N/A    MEDICATION LIST Care Everywhere    IMAGING  (FOR ALL VISITS)     EMG N/A    EEG N/A    LUMBAR PUNCTURE N/A    GENESIS SCAN N/A    DEXA SCAN *NEUROSURGERY* N/A    ULTRASOUND (CAROTID BILAT) *VASCULAR* N/A    MRI (HEAD, NECK, SPINE) N/A    XRAY (SPINE) *NEUROSURGERY* N/A    CT (HEAD, NECK, SPINE) Received [facility]:  CT Head 10/29/20  CTA Head Neck 8/11/20      Action 11/17/20 MV 10.13am   Action Taken Imaging request faxed to [facility] for:  CT Head 10/29/20     Imaging Received  11/18/20 MV 11.41am  HE   Image Type (x): Disc:   PACS: x   Exam Date/Name CT Head 10/29/20 Comments: images resolved in PACS     Phone Call:  11/18/20 MV 11.44am   Contact Name Carolinacindy Marinelli   Pema Called and spoke with patient regarding outside records. Patient stated she has been seen at [facility]. No imaging done at [facility], just office visits.     Action 11/18/20 MV 11.44am   Action Taken Records request faxed to [facility]     Action 11.22.20 MJ   Action Taken Called [facility]- not open. Sent fax request.     Action 11/23/20 MV 7.23am   Action Taken Records received from [facility] via fax. Sent to scanning

## 2020-11-19 NOTE — PROGRESS NOTES
"Department of Neurology  Movement Disorders Division     Patient: Aury Marinelli   MRN: [id number]   : 1955   Date of Visit: 2020     Dear Colleague,     Thank you for referring your patient, Ms. Marinelli, to the [facility] NEUROLOGY at [facility]. Please see a copy of my visit note below.    Reason for visit: tremor   Referring Physician: self referral     History of Present Illness  Ms. Marinelli is a 65 year old R handed female that presents to Neurology Movement clinic as a new patient for evaluation of tremor.     History obtained from patient. Patient reports that tremors began shortly after she was diagnosed with Non-ST elevation myocardial infarction and is status post coronary artery bypass grafting on 2019 with LIMA to LAD, SVG to diagonal, SVG to OM.  Then she was found to have a blocked graft and is status post PCI of LAD and OM on 2019.  She reports that after the MI and procedures in 2019, she began to notice subtle shaking again in her right leg and head then progressed to her hands.  His tremors worsened after patient was started and stopped on several pressure medications including nitrates, diltiazem, lisinopril, Coreg, carvedilol, bisoprolol, propranolol.  Each of the listed medications were stopped due to an adverse effect, mainly nausea and intolerance to the medication.  Of note, she reported some improvement of her tremor while being on propranolol.  After stopping propranolol, tremors worsened.  When the tremors are bad the shaking is \"everywhere\".  Tremors occur with action, no tremors at rest.  She denies tremors at rest.  Only does she have shaking, she also experiences persistent nausea and autonomic effects such as sweating, diaphoresis and palpitations.  She notices the autonomic symptoms are associated with BMs.  Reports feeling nauseated beginning at the end of the day and persisting throughout the day.  She has " developed anxiety due to the poorly controlled shaking.  She states that when her tremors are poorly controlled she begins feeling anxious which then contributes to worsening tremor.  While at rest/laying down, tremors improve and resolve, except for head tremor which minimally persists.  The tremors and nausea improve around 6-10 PM every evening. She also notices fatigue that interferes with her activities.     TREMOR DISABILITY:    Tremor ADL Scale  1. Speaking 1   2. Feeding with a spoon 1   3. Drinking from a glass 1   4. Hygiene 1   5. Dressing 1   6. Pouring 1   7. Carrying food trays, plates or similar items 1   8. Using keys 0   9. Writing 1   10. Working 1(fatigue bothers patient the most)   11. Overall disability with the most affected task 1   Name of most affected task writing   12. Social impact 1   ADL TOTAL 11       Exacerbating factors: anxiety, BMs    Relieving factors: ativan, laying down   Does alcohol relieve symptoms: does not drink   Family Hx of tremor: sister with ET in head (does not take meds), mother's hands shook but was never officially diagnosed  Current treatment for tremors: none  Previous treatment for tremors: She was on propranolol for BP, 10 mg bid, with some improvement in tremor but she could not tolerate this med due to nausea.   Associated symptoms: Speech slows down, tremor in speech  Change in handwriting: just a little sloppy but no micrographia   Resting tremor: none  Bradykinesia: No, but feels fatigued.  Rigidity: Denies. Back aches when tremors are bad. Stiff neck when head shakes badly/consistently.  Gait problem: None. May feel dizzy when getting up.  Falls: No falls.   Numbness/Tingling: No.  Dystonia: No.   Pain: No.   Memory problems: No.  Mood issues: Anxiety.   Sleep issues/Dream enactment: No dream enactment.   Sense of smell: No.  Constipation: BM every other day to twice a day. On Benefiber and a stool softener.   Started Ensure clear x 10 days. Has lost 10  lbs since May due to nausea with decreased oral input.      Review of Systems:  Other than that mentioned above, the remainder of 12 systems reviewed were negative.    Past Medical History:   Diagnosis Date     CAD (coronary artery disease)     Carotid artery disease (Moderate carotid artery stenosis right ICA)     Coronary artery disease involving native coronary artery of native heart without angina pectoris      Dyslipidemia      HTN (hypertension)      Old myocardial infarction     Old MI (myocardial infarction) x 2 in 2019     Past Surgical History:   Procedure Laterality Date     CORONARY ARTERY BYPASS      Non-ST elevation myocardial infarction status post coronary artery bypass grafting on 8/26/2019 with LIMA to LAD, SVG to diagonal, SVG to OM     CV INTERVENTION      PCI of LAD and OM on 4/2/2019     MT PATIENT HAS A CORONARY ARTERY STENT      Stented coronary artery (LAD and OM)     Family History   Problem Relation Age of Onset     Coronary Artery Disease Other      Social History     Socioeconomic History     Marital status:      Spouse name: Not on file     Number of children: Not on file     Years of education: Not on file     Highest education level: Not on file   Occupational History     Not on file   Social Needs     Financial resource strain: Not on file     Food insecurity     Worry: Not on file     Inability: Not on file     Transportation needs     Medical: Not on file     Non-medical: Not on file   Tobacco Use     Smoking status: Never Smoker     Smokeless tobacco: Never Used   Substance and Sexual Activity     Alcohol use: Not Currently     Drug use: Never     Sexual activity: Not on file   Lifestyle     Physical activity     Days per week: Not on file     Minutes per session: Not on file     Stress: Not on file   Relationships     Social connections     Talks on phone: Not on file     Gets together: Not on file     Attends Yarsani service: Not on file     Active member of club or  organization: Not on file     Attends meetings of clubs or organizations: Not on file     Relationship status: Not on file     Intimate partner violence     Fear of current or ex partner: Not on file     Emotionally abused: Not on file     Physically abused: Not on file     Forced sexual activity: Not on file   Other Topics Concern     Not on file   Social History Narrative    Retired ,  37 years, 2 adopted kids       Medications:  Current Outpatient Medications   Medication Sig Dispense Refill     acetaminophen (TYLENOL) 325 MG tablet Take 1,000 mg by mouth       aspirin (ASA) 81 MG EC tablet Take 81 mg by mouth       clopidogrel (PLAVIX) 75 MG tablet Take 75 mg by mouth       lidocaine (LIDODERM) 5 % patch Apply on dry, clean, hairless skin. Apply 1 patch to painful area of skin for up to to 12 hours within 24 hour period.       LORazepam (ATIVAN) 0.5 MG tablet TK 1 T PO QD       metoclopramide (REGLAN) 5 MG tablet TK 1 T PO Q 8 H PRF NAUSEA OR VOM       mirtazapine (REMERON) 7.5 MG tablet TK 1 T PO HS       Multiple Vitamins-Minerals (MULTIVITAMIN ADULT PO)        nitroGLYcerin (NITROSTAT) 0.4 MG sublingual tablet PLACE 1 T UNDER THE TONGUE EVERY 5 MINUTES IF NEED FOR CHEST PAIN       primidone (MYSOLINE) 50 MG tablet Take 0.5 tab (25 mg) one tab nightly x 7 days. Then increase to 1 tab (50 mg) nightly and continue on this dose. 90 tablet 4     rosuvastatin (CRESTOR) 10 MG tablet TK 1 T PO HS       acetaminophen (TYLENOL) 500 MG tablet Take 500-1,000 mg by mouth       busPIRone (BUSPAR) 5 MG tablet TK 1 T PO BID       carvedilol (COREG) 6.25 MG tablet TK 1/2 T PO TID       cloNIDine (CATAPRES) 0.1 MG tablet TK 1 T PO TID IF NEEDED FOR BLOOD PRESSURE GREATER THAN 150/90       felodipine ER (PLENDIL) 2.5 MG 24 hr tablet TK 1 T PO QD       hydrochlorothiazide (HYDRODIURIL) 12.5 MG tablet TK 1 T PO QD       hydrOXYzine (ATARAX) 10 MG tablet TK 1 T PO Q 6 H PRF NAUSEA       oxyCODONE-acetaminophen  (PERCOCET) 5-325 MG tablet TK 1 T PO Q 4 H IF NEEDED FOR PAIN. NM4       PARoxetine (PAXIL) 10 MG tablet TK 1 T PO QAM           Allergies   Allergen Reactions     Felodipine Shortness Of Breath     Lisinopril Shortness Of Breath     SOB, upset stomach     Metoclopramide Palpitations     Bisoprolol      Other reaction(s): *Unknown  Fatigue     Carvedilol      Other reaction(s): *Unknown  Fatigue     Codeine Nausea     Other reaction(s): GI Upset     Ferrous Sulfate Diarrhea     Hydrocodone-Acetaminophen Nausea     Iron Diarrhea     Other reaction(s): GI Upset     Isosorbide Other (See Comments)     Fatigue and high blood pressure     Metoprolol Other (See Comments)     Flu like symptoms     Morphine Nausea     Succinylcholine Other (See Comments)     States made her feel terrible  States made her feel terrible       Tramadol Other (See Comments)     Reaction(s) after stopping tramadol: Shakes/shivers/headache/other symptoms she thought were cardiac symptoms that she found were not.         Physical Exam:  The patient's  weight is 56.2 kg (124 lb). Her blood pressure is 127/69 and her pulse is 82. Her oxygen saturation is 97%.    Physical Exam:  GENERAL: alert, active, attentive, appropriately groomed   HEENT: normocephalic, eyes open with no discharge, nares patent, oropharynx clear-no lesions  CHEST: non labored breathing   EXTREMITIES: no edema/cyanosis in BUE/BLE  PSYCH: mood anxious, tearful at times, became more calm toward end of visit with improvement in tremor     Neurologic Exam:  MENTAL STATUS: Alert and oriented to person, place, time, and situation. Follows commands. Recent and remote memory intact. Attention span and concentration intact. Fund of knowledge intact to current events. Able to recall 3/3 objects. Able to recall current president and past presidents until Loera Sr. Able to spell WORLD backwards. Able to name an object and describe its function (pen).  SPEECH: Fluent, intact comprehension and  articulation  CN: visual fields intact in all fields, EOMIB, no nystagmus, normal saccades, facial movement symmetric, hearing grossly intact to conversation, tongue protrudes midline   MOTOR: Moves all extremities equally against gravity without difficulty with 5/5 strength in BUE/BLE involving ShAbd, ElbFlex, ElbEx, HipFlex, KneeExt, KneeFlex, ADF  Involuntary movements: refer to TETRAS  Agility: Normal finger tapping and toe tapping b/l  Tone: Normal axial and appendicular tone  REFLEXES (R/L): 2/2  in biceps, brachioradialis, triceps, patellars, achilles; no clonus  SENSATION: intact to light touch throughout   COORDINATION: no dysmetria with FTN, HTS  GAIT: able to rise unassisted from a seated position, normal arm swing and normal stride length, no en bloc turns, no ataxia    TETRAS  Motor (Performance) Sub-Scale 11/20/2020   Assessment Time 2:13 PM   DBS - Right Brain None   DBS - Left Brain None   Head 1.5   Face & Jaw 0   Voice 0   Outstretched - RIGHT 1   Outstretched - LEFT 1   Wingbeating - RIGHT 1   Wingbeating - LEFT 1   Kinetic - RIGHT 1.5   Kinetic - LEFT 1.5   Lower Limb - RIGHT 1   Lower Limb - LEFT 1   Lower Limb (Max) 1   Spiral - RIGHT 0.5   Spiral - LEFT 0.5   Handwriting 0   Dot approx - RIGHT 1   Dot approx - LEFT 1.5   Trunk (Standing) 0   Total Right 6   Total Left 6.5   Axial 1.5   TOTAL 13       Data Reviewed:   CT Head 10/29/20  CTA Head Neck 8/11/20    Echo 8/2020        1. Normal left ventricular chamber size. Normal left ventricular wall thickness. Normal left ventricular systolic function.        2. Calculated left ventricular ejection fraction (modified De Anda technique) is 60 %. Normal wall motion.        3. Grade 1 left ventricular diastolic dysfunction        4. Normal right ventricular chamber size. Normal right ventricular systolic function. Normal RVSP.        5. Mild left atrial enlargement.        6. Mildly thickened mitral valve. Borderline posterior mitral valve prolapse.  Mild mitral valve regurgitation, late systolic.        7. Findings similar to the previous [facility] report from 8/2019        8. HR 61, /74 mmHg    Endoscopy reported as normal  Gastric emptying reported as normal      Impression:  Aury Marinelli is a 65 year old female with tremor involving head, b/l hands, b/l legs, and per patient voice (when nervous). Tremors developed subtly in leg and head then hands after a MI s/p  coronary artery bypass grafting on 8/26/2019. Then tremors worsened after trial of multiple mediations for BP. One of which was propranolol which improved tremors but was discontinued due to nausea and intolerance of med. Associated symptoms include nausea, autonomic symptoms associated with BMs and anxiety.She has a family history of ET in sister (head shaking) and likely mother (reported shaking in hands). History and exam is most consistent with Essential Tremor. We discussed that ET is not usually associated with nausea or autonomic symptoms. We discussed starting primidone for treatment of ET. I explained that medications used to treat tremor have been shown to be effective for hand tremor but not as effective for head or vocal tremor. Plan detailed below.    1. Essential Tremor involving head > hands > legs and, to a lesser, extent voice  2. Anxiety: developed due to poorly controlled tremors and cardiac history   3. Nausea - unclear etiology  4. Autonomic symptoms associated with Bowel Movements - Unclear etiology. When shaking occurs, she reports experiencing persistent nausea and autonomic effects such as sweating, diaphoresis and palpitations. She notices the autonomic symptoms are associated with bowel movements.   5. Extensive cardiac history s/p multiple cardiac procedures - follows with cardiology     Plan:   - Start primidone 50 mg: take 0.5 tab nightly x 7 days then increase to one tab nightly and continue on this dose. May stop on lower dose if this improves tremors. If  you notice you are too sleepy in the mornings, take primidone earlier in the evenings.    - SEs discussed    - Cautioned to no abruptly stop this medicaiton  - Stop ativan slowly by stopping evening dose x 7 days then stop.  - Okay to take hydroxyzine for anxiety or Remeron but cautioned that there are associated drug interactions if these meds are taken together. Follow instructions as prescribed by PCP.  - We discussed Botox injections for treatment of head tremor. Patient not interested as this would not treat internal and body tremors.     Patient to return in 6-7 weeks, for virtual visit, 30 minutes.     Elana Sevilla DO, MA   of Neurology   [facility]     The total time spent with the patient was 90 minutes, and greater than 50% of this time was spent in counseling and coordination of care.

## 2020-11-20 ENCOUNTER — OFFICE VISIT (OUTPATIENT)
Dept: NEUROLOGY | Facility: CLINIC | Age: 65
End: 2020-11-20
Payer: COMMERCIAL

## 2020-11-20 VITALS
SYSTOLIC BLOOD PRESSURE: 127 MMHG | HEART RATE: 82 BPM | DIASTOLIC BLOOD PRESSURE: 69 MMHG | OXYGEN SATURATION: 97 % | WEIGHT: 124 LBS

## 2020-11-20 DIAGNOSIS — G25.0 ESSENTIAL TREMOR: Primary | ICD-10-CM

## 2020-11-20 PROCEDURE — 99354 PR PROLONGED SERV,OFFICE,1ST HR: CPT | Performed by: PSYCHIATRY & NEUROLOGY

## 2020-11-20 PROCEDURE — 99205 OFFICE O/P NEW HI 60 MIN: CPT | Performed by: PSYCHIATRY & NEUROLOGY

## 2020-11-20 RX ORDER — CLONIDINE HYDROCHLORIDE 0.1 MG/1
TABLET ORAL
COMMUNITY
Start: 2020-08-20 | End: 2021-04-02

## 2020-11-20 RX ORDER — NIFEDIPINE 30 MG/1
TABLET, EXTENDED RELEASE ORAL
COMMUNITY
Start: 2020-08-20 | End: 2020-11-20

## 2020-11-20 RX ORDER — ROSUVASTATIN CALCIUM 10 MG/1
TABLET, COATED ORAL
COMMUNITY
Start: 2020-11-11

## 2020-11-20 RX ORDER — ACETAMINOPHEN 325 MG/1
1000 TABLET ORAL
COMMUNITY
Start: 2019-08-31

## 2020-11-20 RX ORDER — OXYCODONE AND ACETAMINOPHEN 5; 325 MG/1; MG/1
TABLET ORAL
COMMUNITY
Start: 2020-07-24 | End: 2021-02-18

## 2020-11-20 RX ORDER — HYDROCHLOROTHIAZIDE 12.5 MG/1
TABLET ORAL
COMMUNITY
Start: 2020-08-31 | End: 2021-02-18

## 2020-11-20 RX ORDER — PROCHLORPERAZINE MALEATE 5 MG
TABLET ORAL
COMMUNITY
Start: 2020-10-06 | End: 2020-11-20

## 2020-11-20 RX ORDER — ISOSORBIDE MONONITRATE 30 MG/1
TABLET, EXTENDED RELEASE ORAL
COMMUNITY
Start: 2020-06-06 | End: 2020-11-20

## 2020-11-20 RX ORDER — MIRTAZAPINE 7.5 MG/1
TABLET, FILM COATED ORAL
COMMUNITY
Start: 2020-10-05 | End: 2021-02-18

## 2020-11-20 RX ORDER — SCOLOPAMINE TRANSDERMAL SYSTEM 1 MG/1
PATCH, EXTENDED RELEASE TRANSDERMAL
COMMUNITY
Start: 2020-11-16 | End: 2020-11-20

## 2020-11-20 RX ORDER — ACETAMINOPHEN 500 MG
500-1000 TABLET ORAL
COMMUNITY
End: 2020-11-20

## 2020-11-20 RX ORDER — PROPRANOLOL HYDROCHLORIDE 10 MG/1
TABLET ORAL
COMMUNITY
Start: 2020-10-14 | End: 2020-11-20

## 2020-11-20 RX ORDER — METOCLOPRAMIDE 5 MG/1
TABLET ORAL
COMMUNITY
Start: 2020-10-01 | End: 2021-02-18

## 2020-11-20 RX ORDER — FELODIPINE 2.5 MG/1
TABLET, EXTENDED RELEASE ORAL
COMMUNITY
Start: 2020-08-27 | End: 2021-02-26

## 2020-11-20 RX ORDER — CARVEDILOL 6.25 MG/1
TABLET ORAL
COMMUNITY
Start: 2020-07-31 | End: 2020-11-20

## 2020-11-20 RX ORDER — ONDANSETRON 4 MG/1
TABLET, FILM COATED ORAL
COMMUNITY
Start: 2020-08-21 | End: 2020-11-20

## 2020-11-20 RX ORDER — LISINOPRIL 2.5 MG/1
TABLET ORAL
COMMUNITY
Start: 2020-08-15 | End: 2020-11-20

## 2020-11-20 RX ORDER — NITROGLYCERIN 0.4 MG/1
TABLET SUBLINGUAL
COMMUNITY
Start: 2020-10-09

## 2020-11-20 RX ORDER — CLOPIDOGREL BISULFATE 75 MG/1
75 TABLET ORAL
COMMUNITY
Start: 2019-09-17

## 2020-11-20 RX ORDER — LIDOCAINE 50 MG/G
PATCH TOPICAL
COMMUNITY
Start: 2019-11-01 | End: 2021-02-26

## 2020-11-20 RX ORDER — IVABRADINE 5 MG/1
2.5 TABLET, FILM COATED ORAL
COMMUNITY
Start: 2020-11-20 | End: 2020-11-20

## 2020-11-20 RX ORDER — PAROXETINE 10 MG/1
TABLET, FILM COATED ORAL
COMMUNITY
Start: 2020-11-05 | End: 2021-02-18

## 2020-11-20 RX ORDER — PROMETHAZINE HYDROCHLORIDE 12.5 MG/1
TABLET ORAL
COMMUNITY
Start: 2020-09-30 | End: 2020-11-20

## 2020-11-20 RX ORDER — LORAZEPAM 0.5 MG/1
TABLET ORAL
COMMUNITY
Start: 2020-11-16 | End: 2021-04-02

## 2020-11-20 RX ORDER — HYDROXYZINE HYDROCHLORIDE 10 MG/1
TABLET, FILM COATED ORAL
COMMUNITY
Start: 2020-11-03 | End: 2021-02-18

## 2020-11-20 RX ORDER — PRIMIDONE 50 MG/1
TABLET ORAL
Qty: 90 TABLET | Refills: 4 | Status: SHIPPED | OUTPATIENT
Start: 2020-11-20 | End: 2020-11-24 | Stop reason: SINTOL

## 2020-11-20 RX ORDER — BUSPIRONE HYDROCHLORIDE 5 MG/1
TABLET ORAL
COMMUNITY
Start: 2020-11-05 | End: 2021-02-18

## 2020-11-20 SDOH — HEALTH STABILITY: MENTAL HEALTH: HOW OFTEN DO YOU HAVE A DRINK CONTAINING ALCOHOL?: NOT ASKED

## 2020-11-20 SDOH — HEALTH STABILITY: MENTAL HEALTH: HOW OFTEN DO YOU HAVE 6 OR MORE DRINKS ON ONE OCCASION?: NOT ASKED

## 2020-11-20 SDOH — HEALTH STABILITY: MENTAL HEALTH: HOW MANY STANDARD DRINKS CONTAINING ALCOHOL DO YOU HAVE ON A TYPICAL DAY?: NOT ASKED

## 2020-11-20 ASSESSMENT — ACTIVITIES OF DAILY LIVING (ADL)
DRESS: (1) SLIGHTLY ABNORMAL. TREMOR IS PRESENT BUT DOES NOT INTERFERE WITH DRESSING.
SPEAKING: (1) SLIGHT VOICE TREMULOUSNESS, ONLY WHEN "NERVOUS".
DRINKING_FROM_A_GLASS: (1) SLIGHTLY ABNORMAL. TREMOR IS PRESENT BUT DOES NOT INTERFERE WITH DRINKING FROM A GLASS.
HYGIENE: (1) SLIGHTLY ABNORMAL. TREMOR IS PRESENT BUT DOES NOT INTERFERE WITH HYGIENE.
WORKING: (1) SLIGHTLY ABNORMAL. TREMOR IS PRESENT BUT DOES NOT AFFECT PERFORMANCE AT WORK OR AT HOME.
POURING: (1) SLIGHTLY ABNORMAL. TREMOR IS PRESENT BUT DOES NOT INTERFERE WITH POURING.
SOCIAL_IMPACT: (1) AWARE OF TREMOR, BUT IT DOES NOT AFFECT LIFESTYLE OR PROFESSIONAL LIFE.
FEEDING_WITH_A_SPOON: (1) SLIGHTLY ABNORMAL. TREMOR IS PRESENT BUT DOES NOT INTERFERE WITH FEEDING WITH A SPOON.
OVERALL_DISABILITY_WITH_THE_MOST_AFFECTED_TASK: (1) SLIGHTLY ABNORMAL. TREMOR PRESENT BUT DOES NOT AFFECT TASK.
TOTAL_SCORE: 11
OVERALL_DISABILITY_WITH_THE_MOST_AFFECTED_TASK: WRITING
WRITING: (1) SLIGHTLY ABNORMAL. TREMOR PRESENT BUT DOES NOT INTERFERE WITH WRITING.
USING_KEYS: (0) NORMAL
CARRYING_FOOD_TRAYS_PLATES_OR_SIMILAR_ITEMS: (1) SLIGHTLY ABNORMAL. TREMOR IS PRESENT BUT DOES NOT INTERFERE WITH CARRYING FOOD TRAYS, PLATES OR SIMILAR ITEMS.

## 2020-11-20 NOTE — LETTER
"2020       RE: Aury Marinelli  [address] [place] [address]     Dear Colleague,    Thank you for referring your patient, Aury Marinelli, to the [facility] NEUROLOGY [facility] [place] at [facility]. Please see a copy of my visit note below.    Department of Neurology  Movement Disorders Division     Patient: Aury Marinelli   MRN: [id number]   : 1955   Date of Visit: 2020     Dear Colleague,     Thank you for referring your patient, Ms. Marinelli, to the [facility] NEUROLOGY at [facility]. Please see a copy of my visit note below.    Reason for visit: tremor   Referring Physician: self referral     History of Present Illness  Ms. Marinelli is a 65 year old R handed female that presents to Neurology Movement clinic as a new patient for evaluation of tremor.     History obtained from patient. Patient reports that tremors began shortly after she was diagnosed with Non-ST elevation myocardial infarction and is status post coronary artery bypass grafting on 2019 with LIMA to LAD, SVG to diagonal, SVG to OM.  Then she was found to have a blocked graft and is status post PCI of LAD and OM on 2019.  She reports that after the MI and procedures in 2019, she began to notice subtle shaking again in her right leg and head then progressed to her hands.  His tremors worsened after patient was started and stopped on several pressure medications including nitrates, diltiazem, lisinopril, Coreg, carvedilol, bisoprolol, propranolol.  Each of the listed medications were stopped due to an adverse effect, mainly nausea and intolerance to the medication.  Of note, she reported some improvement of her tremor while being on propranolol.  After stopping propranolol, tremors worsened.  When the tremors are bad the shaking is \"everywhere\".  Tremors occur with action, no tremors at rest.  She denies tremors at rest.  " Only does she have shaking, she also experiences persistent nausea and autonomic effects such as sweating, diaphoresis and palpitations.  She notices the autonomic symptoms are associated with BMs.  Reports feeling nauseated beginning at the end of the day and persisting throughout the day.  She has developed anxiety due to the poorly controlled shaking.  She states that when her tremors are poorly controlled she begins feeling anxious which then contributes to worsening tremor.  While at rest/laying down, tremors improve and resolve, except for head tremor which minimally persists.  The tremors and nausea improve around 6-10 PM every evening. She also notices fatigue that interferes with her activities.     TREMOR DISABILITY:    Tremor ADL Scale  1. Speaking 1   2. Feeding with a spoon 1   3. Drinking from a glass 1   4. Hygiene 1   5. Dressing 1   6. Pouring 1   7. Carrying food trays, plates or similar items 1   8. Using keys 0   9. Writing 1   10. Working 1(fatigue bothers patient the most)   11. Overall disability with the most affected task 1   Name of most affected task writing   12. Social impact 1   ADL TOTAL 11       Exacerbating factors: anxiety, BMs    Relieving factors: ativan, laying down   Does alcohol relieve symptoms: does not drink   Family Hx of tremor: sister with ET in head (does not take meds), mother's hands shook but was never officially diagnosed  Current treatment for tremors: none  Previous treatment for tremors: She was on propranolol for BP, 10 mg bid, with some improvement in tremor but she could not tolerate this med due to nausea.   Associated symptoms: Speech slows down, tremor in speech  Change in handwriting: just a little sloppy but no micrographia   Resting tremor: none  Bradykinesia: No, but feels fatigued.  Rigidity: Denies. Back aches when tremors are bad. Stiff neck when head shakes badly/consistently.  Gait problem: None. May feel dizzy when getting up.  Falls: No falls.    Numbness/Tingling: No.  Dystonia: No.   Pain: No.   Memory problems: No.  Mood issues: Anxiety.   Sleep issues/Dream enactment: No dream enactment.   Sense of smell: No.  Constipation: BM every other day to twice a day. On Benefiber and a stool softener.   Started Ensure clear x 10 days. Has lost 10 lbs since May due to nausea with decreased oral input.      Review of Systems:  Other than that mentioned above, the remainder of 12 systems reviewed were negative.    Past Medical History:   Diagnosis Date     CAD (coronary artery disease)     Carotid artery disease (Moderate carotid artery stenosis right ICA)     Coronary artery disease involving native coronary artery of native heart without angina pectoris      Dyslipidemia      HTN (hypertension)      Old myocardial infarction     Old MI (myocardial infarction) x 2 in 2019     Past Surgical History:   Procedure Laterality Date     CORONARY ARTERY BYPASS      Non-ST elevation myocardial infarction status post coronary artery bypass grafting on 8/26/2019 with LIMA to LAD, SVG to diagonal, SVG to OM     CV INTERVENTION      PCI of LAD and OM on 4/2/2019     [place] PATIENT HAS A CORONARY ARTERY STENT      Stented coronary artery (LAD and OM)     Family History   Problem Relation Age of Onset     Coronary Artery Disease Other      Social History     Socioeconomic History     Marital status:      Spouse name: Not on file     Number of children: Not on file     Years of education: Not on file     Highest education level: Not on file   Occupational History     Not on file   Social Needs     Financial resource strain: Not on file     Food insecurity     Worry: Not on file     Inability: Not on file     Transportation needs     Medical: Not on file     Non-medical: Not on file   Tobacco Use     Smoking status: Never Smoker     Smokeless tobacco: Never Used   Substance and Sexual Activity     Alcohol use: Not Currently     Drug use: Never     Sexual activity: Not on file    Lifestyle     Physical activity     Days per week: Not on file     Minutes per session: Not on file     Stress: Not on file   Relationships     Social connections     Talks on phone: Not on file     Gets together: Not on file     Attends [employer]ec[employer]alism service: Not on file     Active member of club or organization: Not on file     Attends meetings of clubs or organizations: Not on file     Relationship status: Not on file     Intimate partner violence     Fear of current or ex partner: Not on file     Emotionally abused: Not on file     Physically abused: Not on file     Forced sexual activity: Not on file   Other Topics Concern     Not on file   Social History Narrative    Retired ,  37 years, 2 adopted kids       Medications:  Current Outpatient Medications   Medication Sig Dispense Refill     acetaminophen (TYLENOL) 325 MG tablet Take 1,000 mg by mouth       aspirin (ASA) 81 MG EC tablet Take 81 mg by mouth       clopidogrel (PLAVIX) 75 MG tablet Take 75 mg by mouth       lidocaine (LIDODERM) 5 % patch Apply on dry, clean, hairless skin. Apply 1 patch to painful area of skin for up to to 12 hours within 24 hour period.       LORazepam (ATIVAN) 0.5 MG tablet TK 1 T PO QD       metoclopramide (REGLAN) 5 MG tablet TK 1 T PO Q 8 H PRF NAUSEA OR VOM       mirtazapine (REMERON) 7.5 MG tablet TK 1 T PO HS       Multiple Vitamins-Minerals (MULTIVITAMIN ADULT PO)        nitroGLYcerin (NITROSTAT) 0.4 MG sublingual tablet PLACE 1 T UNDER THE TONGUE EVERY 5 MINUTES IF NEED FOR CHEST PAIN       primidone (MYSOLINE) 50 MG tablet Take 0.5 tab (25 mg) one tab nightly x 7 days. Then increase to 1 tab (50 mg) nightly and continue on this dose. 90 tablet 4     rosuvastatin (CRESTOR) 10 MG tablet TK 1 T PO HS       acetaminophen (TYLENOL) 500 MG tablet Take 500-1,000 mg by mouth       busPIRone (BUSPAR) 5 MG tablet TK 1 T PO BID       carvedilol (COREG) 6.25 MG tablet TK 1/2 T PO TID       cloNIDine (CATAPRES) 0.1 MG  tablet TK 1 T PO TID IF NEEDED FOR BLOOD PRESSURE GREATER THAN 150/90       felodipine ER (PLENDIL) 2.5 MG 24 hr tablet TK 1 T PO QD       hydrochlorothiazide (HYDRODIURIL) 12.5 MG tablet TK 1 T PO QD       hydrOXYzine (ATARAX) 10 MG tablet TK 1 T PO Q 6 H PRF NAUSEA       oxyCODONE-acetaminophen (PERCOCET) 5-325 MG tablet TK 1 T PO Q 4 H IF NEEDED FOR PAIN. NM4       PARoxetine (PAXIL) 10 MG tablet TK 1 T PO QAM           Allergies   Allergen Reactions     Felodipine Shortness Of Breath     Lisinopril Shortness Of Breath     SOB, upset stomach     Metoclopramide Palpitations     Bisoprolol      Other reaction(s): *Unknown  Fatigue     Carvedilol      Other reaction(s): *Unknown  Fatigue     Codeine Nausea     Other reaction(s): GI Upset     Ferrous Sulfate Diarrhea     Hydrocodone-Acetaminophen Nausea     Iron Diarrhea     Other reaction(s): GI Upset     Isosorbide Other (See Comments)     Fatigue and high blood pressure     Metoprolol Other (See Comments)     Flu like symptoms     Morphine Nausea     Succinylcholine Other (See Comments)     States made her feel terrible  States made her feel terrible       Tramadol Other (See Comments)     Reaction(s) after stopping tramadol: Shakes/shivers/headache/other symptoms she thought were cardiac symptoms that she found were not.         Physical Exam:  The patient's  weight is 56.2 kg (124 lb). Her blood pressure is 127/69 and her pulse is 82. Her oxygen saturation is 97%.    Physical Exam:  GENERAL: alert, active, attentive, appropriately groomed   HEENT: normocephalic, eyes open with no discharge, nares patent, oropharynx clear-no lesions  CHEST: non labored breathing   EXTREMITIES: no edema/cyanosis in BUE/BLE  PSYCH: mood anxious, tearful at times, became more calm toward end of visit with improvement in tremor     Neurologic Exam:  MENTAL STATUS: Alert and oriented to person, place, time, and situation. Follows commands. Recent and remote memory intact. Attention  span and concentration intact. Fund of knowledge intact to current events. Able to recall 3/3 objects. Able to recall current president and past presidents until Loera Sr. Able to spell WORLD backwards. Able to name an object and describe its function (pen).  SPEECH: Fluent, intact comprehension and articulation  CN: visual fields intact in all fields, EOMIB, no nystagmus, normal saccades, facial movement symmetric, hearing grossly intact to conversation, tongue protrudes midline   MOTOR: Moves all extremities equally against gravity without difficulty with 5/5 strength in BUE/BLE involving ShAbd, ElbFlex, ElbEx, HipFlex, KneeExt, KneeFlex, ADF  Involuntary movements: refer to TETRAS  Agility: Normal finger tapping and toe tapping b/l  Tone: Normal axial and appendicular tone  REFLEXES (R/L): 2/2  in biceps, brachioradialis, triceps, patellars, achilles; no clonus  SENSATION: intact to light touch throughout   COORDINATION: no dysmetria with FTN, HTS  GAIT: able to rise unassisted from a seated position, normal arm swing and normal stride length, no en bloc turns, no ataxia    TETRAS  Motor (Performance) Sub-Scale 11/20/2020   Assessment Time 2:13 PM   DBS - Right Brain None   DBS - Left Brain None   Head 1.5   Face & Jaw 0   Voice 0   Outstretched - RIGHT 1   Outstretched - LEFT 1   Wingbeating - RIGHT 1   Wingbeating - LEFT 1   Kinetic - RIGHT 1.5   Kinetic - LEFT 1.5   Lower Limb - RIGHT 1   Lower Limb - LEFT 1   Lower Limb (Max) 1   Spiral - RIGHT 0.5   Spiral - LEFT 0.5   Handwriting 0   Dot approx - RIGHT 1   Dot approx - LEFT 1.5   Trunk (Standing) 0   Total Right 6   Total Left 6.5   Axial 1.5   TOTAL 13       Data Reviewed:   CT Head 10/29/20  CTA Head Neck 8/11/20    Echo 8/2020        1. Normal left ventricular chamber size. Normal left ventricular wall thickness. Normal left ventricular systolic function.        2. Calculated left ventricular ejection fraction (modified De Anda technique) is 60 %. Normal  wall motion.        3. Grade 1 left ventricular diastolic dysfunction        4. Normal right ventricular chamber size. Normal right ventricular systolic function. Normal RVSP.        5. Mild left atrial enlargement.        6. Mildly thickened mitral valve. Borderline posterior mitral valve prolapse. Mild mitral valve regurgitation, late systolic.        7. Findings similar to the previous [facility] report from 8/2019        8. HR 61, /74 mmHg    Endoscopy reported as normal  Gastric emptying reported as normal      Impression:  Aury Marinelli is a 65 year old female with tremor involving head, b/l hands, b/l legs, and per patient voice (when nervous). Tremors developed subtly in leg and head then hands after a MI s/p  coronary artery bypass grafting on 8/26/2019. Then tremors worsened after trial of multiple mediations for BP. One of which was propranolol which improved tremors but was discontinued due to nausea and intolerance of med. Associated symptoms include nausea, autonomic symptoms associated with BMs and anxiety.She has a family history of ET in sister (head shaking) and likely mother (reported shaking in hands). History and exam is most consistent with Essential Tremor. We discussed that ET is not usually associated with nausea or autonomic symptoms. We discussed starting primidone for treatment of ET. I explained that medications used to treat tremor have been shown to be effective for hand tremor but not as effective for head or vocal tremor. Plan detailed below.    1. Essential Tremor involving head > hands > legs and, to a lesser, extent voice  2. Anxiety: developed due to poorly controlled tremors and cardiac history   3. Nausea - unclear etiology  3. Autonomic symptoms associated with BM - unclear etiology   4. Extensive cardiac history s/p multiple cardiac procedures - follows with cardiology     Plan:   - Start primidone 50 mg: take 0.5 tab nightly x 7 days then increase to one tab nightly  and continue on this dose. May stop on lower dose if this improves tremors. If you notice you are too sleepy in the mornings, take primidone earlier in the evenings.    - SEs discussed    - Cautioned to no abruptly stop this medicaiton  - Stop ativan slowly by stopping evening dose x 7 days then stop.  - Okay to take hydroxyzine for anxiety or Remeron but cautioned that there are associated drug interactions if these meds are taken together. Follow instructions as prescribed by PCP.  - We discussed Botox injections for treatment of head tremor. Patient not interested as this would not treat internal and body tremors.     Patient to return in 6-7 weeks, for virtual visit, 30 minutes.     Elana Sevilla DO MA   of Neurology   [facility]     The total time spent with the patient was 90 minutes, and greater than 50% of this time was spent in counseling and coordination of care.

## 2020-11-20 NOTE — PATIENT INSTRUCTIONS
Today you spoke with Dr. Sevilla. We discussed your diagnosis of Essential tremor. We talked about a plan to improve the tremors. The plan we discussed is detailed below.     Plan:   - Start primidone 50 mg: take 0.5 tab nightly x 7 days then increase to one tab nightly and continue on this dose. May stop on lower dose if this improves tremors. If you notice you are too sleepy in the mornings, take primidone earlier in the evenings.   - Stop ativan slowly by stopping evening dose x 7 days then stop.  - Okay to take hydroxyzine for anxiety. Follow instructions as prescribed by PCP.  - We disccssed Botox injections for treatment of head tremor.     Patient to return in 6-7 weeks, for virtual visit, 30 minutes.

## 2020-11-20 NOTE — NURSING NOTE
Chief Complaint   Patient presents with     Consult     P NEW MOVEMENT DISORDER     Gold Ornelas

## 2020-11-23 ENCOUNTER — TELEPHONE (OUTPATIENT)
Dept: NEUROLOGY | Facility: CLINIC | Age: 65
End: 2020-11-23

## 2020-11-23 DIAGNOSIS — R11.0 NAUSEA: Primary | ICD-10-CM

## 2020-11-23 NOTE — TELEPHONE ENCOUNTER
[facility] Call Center    Phone Message    May a detailed message be left on voicemail: yes     Reason for Call: Other: Patient calling to see if Dr. Sevilla can write a referral for her to endocrinology regarding nausea and hormones.     Please advise and call patient back to discuss further if any questions - Patient would like a call back once order is placed.     Action Taken: Other: [facility] NEUROLOGY    Travel Screening: Not Applicable

## 2020-11-23 NOTE — TELEPHONE ENCOUNTER
Per patient request, Endocrinology referral placed for evaluation of nausea.     Elana Sevilla DO   of Neurology   [facility]

## 2020-11-24 ENCOUNTER — TELEPHONE (OUTPATIENT)
Dept: ENDOCRINOLOGY | Facility: CLINIC | Age: 65
End: 2020-11-24

## 2020-11-24 RX ORDER — PROPRANOLOL HYDROCHLORIDE 10 MG/1
5 TABLET ORAL 2 TIMES DAILY
COMMUNITY
End: 2021-01-15 | Stop reason: SINTOL

## 2020-11-24 NOTE — TELEPHONE ENCOUNTER
Patient reported hallucinations and severe nausea to taking 25 mg primidone x 1. Asked to stop this med. She was restarted on propranolol 10 mg 0.5 tab bid, per PCP. I agree with this medication for treatment of ET. Above meds updated in patient's chart.    Elana Sevilla DO   of Neurology   [facility]

## 2020-11-24 NOTE — TELEPHONE ENCOUNTER
"Aury stated she took the primidone 0.5 tab (25 mg) for one night and had hallucinations, severe nausea, and felt \"out of it.\" She has not taken this since.  She is back on propranolol 10 mg 1/2 tab in the morning and afternoon prescribed by her PCP for her heart. She is sensitive to medications and will be on this lower dose for awhile and it may be increased in the future. She is hopeful it will help with the tremors.     She has an endocrinology appointment at the [facility]. She will let us know if we need to send or fax this to them.  "

## 2020-11-24 NOTE — TELEPHONE ENCOUNTER
[facility] Call Center    Phone Message    May a detailed message be left on voicemail: yes     Reason for Call: Other: Per Patient is wanting to get a call back in regards to knowing if able to have apt for in office. Patient states has been having nausea for 6 months and has gotten worse over the past 6 months. Patient states it is almost unbareable and happening everyday. Please advise.      Action Taken: Message routed to:  [facility] endo    Travel Screening: Not Applicable

## 2020-11-25 NOTE — TELEPHONE ENCOUNTER
RECORDS RECEIVED FROM: internal    DATE RECEIVED: 12.9.20    NOTES (FOR ALL VISITS) STATUS DETAILS   OFFICE NOTES from referring provider Elana Montgomery   OFFICE NOTES from other specialist na    ED NOTES [facility]- 6.8.20, 11.8.19 Rigoberto BRITTON  10.23.19 radha    OPERATIVE REPORT  (thyroid, pituitary, adrenal, parathyroid) na    MEDICATION LIST Internal     IMAGING      DEXASCAN na    MRI (BRAIN) na    XR (Chest) [facility]- 6.8.20, 5.28.20, 4.23.20, 4.22.20, 10.23.19, 8.29.19, 8.28.19, 8.27.19,   [facility]- 12.27.19,  9.3.19, 8.26.19, 8.22.19. 8.21.19,     CT (HEAD/NECK/CHEST/ABDOMEN) [facility]- 10.30.20, 8.12.20, 11.8.19, 5.27.19, 4.4.19, 3.30.19,   [facility] 7.9.20,    NUCLEAR  na    ULTRASOUND (HEAD/NECK) na    LABS     DIABETES: HBGA1C, CREATININE, FASTING LIPIDS, MICROALBUMIN URINE, POTASSIUM, TSH, T4    THYROID: TSH, T4, CBC, THYRODLONULIN, TOTAL T3, FREE T4, CALCITONIN, CEA CE CREATININE 11.20.18   HBGA1C 6.30.20   LIPIDS 8.6.20   POTASSIUM 4.24.20   TSH- 11.20.18   VITAMIN D11.20.18

## 2020-11-30 ENCOUNTER — COMMUNICATION - HEALTHEAST (OUTPATIENT)
Dept: SCHEDULING | Facility: CLINIC | Age: 65
End: 2020-11-30

## 2020-12-09 ENCOUNTER — PRE VISIT (OUTPATIENT)
Dept: ENDOCRINOLOGY | Facility: CLINIC | Age: 65
End: 2020-12-09

## 2020-12-11 ENCOUNTER — PRE VISIT (OUTPATIENT)
Dept: NEUROLOGY | Facility: CLINIC | Age: 65
End: 2020-12-11

## 2020-12-15 ENCOUNTER — COMMUNICATION - HEALTHEAST (OUTPATIENT)
Dept: SCHEDULING | Facility: CLINIC | Age: 65
End: 2020-12-15

## 2021-01-04 ENCOUNTER — HEALTH MAINTENANCE LETTER (OUTPATIENT)
Age: 66
End: 2021-01-04

## 2021-01-08 ENCOUNTER — MYC MEDICAL ADVICE (OUTPATIENT)
Dept: NEUROLOGY | Facility: CLINIC | Age: 66
End: 2021-01-08

## 2021-01-12 ASSESSMENT — ENCOUNTER SYMPTOMS
DECREASED CONCENTRATION: 0
PANIC: 0
SNORES LOUDLY: 0
HEADACHES: 1
DYSPNEA ON EXERTION: 1
TINGLING: 0
LIGHT-HEADEDNESS: 0
SEIZURES: 0
MEMORY LOSS: 0
COUGH: 0
LOSS OF CONSCIOUSNESS: 0
WEAKNESS: 1
EXERCISE INTOLERANCE: 1
HEMOPTYSIS: 0
NERVOUS/ANXIOUS: 1
LEG PAIN: 0
SHORTNESS OF BREATH: 1
DIZZINESS: 0
HYPOTENSION: 1
WHEEZING: 0
NUMBNESS: 0
SPEECH CHANGE: 0
DEPRESSION: 1
ORTHOPNEA: 1
PALPITATIONS: 0
HYPERTENSION: 1
DISTURBANCES IN COORDINATION: 0
SWOLLEN GLANDS: 0
PARALYSIS: 0
COUGH DISTURBING SLEEP: 0
INSOMNIA: 1
SYNCOPE: 0
BRUISES/BLEEDS EASILY: 0
SPUTUM PRODUCTION: 0
SLEEP DISTURBANCES DUE TO BREATHING: 0
TREMORS: 1
POSTURAL DYSPNEA: 1

## 2021-01-15 ENCOUNTER — OFFICE VISIT (OUTPATIENT)
Dept: NEUROLOGY | Facility: CLINIC | Age: 66
End: 2021-01-15
Payer: COMMERCIAL

## 2021-01-15 VITALS
RESPIRATION RATE: 15 BRPM | OXYGEN SATURATION: 98 % | HEART RATE: 70 BPM | WEIGHT: 122 LBS | SYSTOLIC BLOOD PRESSURE: 106 MMHG | DIASTOLIC BLOOD PRESSURE: 67 MMHG

## 2021-01-15 DIAGNOSIS — E03.9 HYPOTHYROIDISM, UNSPECIFIED TYPE: ICD-10-CM

## 2021-01-15 DIAGNOSIS — R11.0 NAUSEA: ICD-10-CM

## 2021-01-15 DIAGNOSIS — G25.0 ESSENTIAL TREMOR: Primary | ICD-10-CM

## 2021-01-15 DIAGNOSIS — R45.89 ANXIETY ABOUT HEALTH: ICD-10-CM

## 2021-01-15 PROCEDURE — 99215 OFFICE O/P EST HI 40 MIN: CPT | Performed by: PSYCHIATRY & NEUROLOGY

## 2021-01-15 RX ORDER — GABAPENTIN 300 MG/1
CAPSULE ORAL
Qty: 270 CAPSULE | Refills: 3 | Status: SHIPPED | OUTPATIENT
Start: 2021-01-15 | End: 2021-02-02

## 2021-01-15 NOTE — PATIENT INSTRUCTIONS
Today you spoke with Dr. Sevilla. We discussed a plan to improve tremor. The plan we discussed is detailed below.     Plan:   -Plan:   - Start gabapentin for treatment of tremor. Follow the titration table below.   Gabapentin 300 mg AM PM Bed   Week 1   150mg   Week 2   300mg   Week 3 150 mg  300 mg   Week 4 300 mg  300 mg   Week 5 300 mg  150 mg 300 mg   Week 6 300mg 300mg 300mg    - Stop at the dose that improve tremors.    - Common side effects include drowsiness and dizziness. Do not drive until you know how this medication will affect you.   - If you experience SEs, go back to the previous dose. Call the clinic for an update.   - Appropriate for botulinum toxin treatment for head tremor in setting of essential tremor. We discussed benefits and potential adverse effects of this treatment. Patient would time to think about it.  - Call if you are interested and we will send a prior authorization for Botox, and once it is approved we call you to schedule an appointment.     Patient to return in 5 weeks, for in-person visit, 30 minutes.

## 2021-01-15 NOTE — LETTER
1/15/2021       RE: Aury Marinelli  [address]  [place] [address]     Dear Colleague,    Thank you for referring your patient, Aury Marinelli, to the [facility] NEUROLOGY [facility] [place] at [facility]. Please see a copy of my visit note below.    Department of Neurology  Movement Disorders Division     Patient: Aury Marinelli   MRN: [id number]   : 1955   Date of Visit: 01/15/21    REASON FOR VISIT: follow up for management of ET  Referring Physician: self referral     History of Present Illness  Ms. Marinelli is a 65 year old female that presents to Neurology Movement clinic for follow up regarding management of ET.  The patient was last seen on 2020 and diagnosed with ET. She was recommended to start primidone due to poor tolerance to propranolol but was not able to tolerate low dose primidone and thus it was stopped. Patient reported hallucinations and severe nausea to taking 25 mg primidone x 1. She was restarted on propranolol 10 mg 0.5 tab bid, per PCP. At the above visit, we also discussed  Botox injections for treatment of head tremor. Patient was not interested as this would not treat internal and body tremors.     Since this visit, she has been stopped on ASA via Dr. Bates, cardiologist, and continues to be on clopidogrel. She was seen by GI and concerns were expressed regarding mesenteric ischemia as the etiology of her chronic nausea. Thus evaluation with CTA of abd pelvis was ordered to evaluate for any stenosis of mesenteric arteries. She was evaluated by [facility] whom did not feel this was an issue that required surgery.     History obtained from patient and , Leonard. Today her main complaint is internal tremors, especially of her core which is then associated with aches of her ribs and back.  She also has tremors of her head which is associated with neck and upper back stiffness and aches.  As stated above, she was  trialed on propranolol however this medication was stopped she cannot tolerate side effects.  She noted that being on this medication caused her to become breathless and did not notice an improvement with tremors.  She continues to take Ativan as needed which helps her calm down for at least 2 to 3 hours and improves the tremors.    Patient reports that nausea has vastly improved and is uncertain as to why.  She is able to eat and is gaining weight.  The only change she can recollect is a cardiac stent placement about 3 to 4 weeks ago.  She has also been told that she is anemic and has started iron infusion therapy.    Review of Systems:  Other than that mentioned above, the remainder of 12 systems reviewed were negative.    PMH: unchanged  PSH: unchanged  FH: unchanged  SH: unchanged    Medications:  Current Outpatient Medications   Medication Sig Dispense Refill     acetaminophen (TYLENOL) 325 MG tablet Take 1,000 mg by mouth       aspirin (ASA) 81 MG EC tablet Take 81 mg by mouth       cloNIDine (CATAPRES) 0.1 MG tablet TK 1 T PO TID IF NEEDED FOR BLOOD PRESSURE GREATER THAN 150/90       clopidogrel (PLAVIX) 75 MG tablet Take 75 mg by mouth       felodipine ER (PLENDIL) 2.5 MG 24 hr tablet TK 1 T PO QD       gabapentin (NEURONTIN) 300 MG capsule Wk 1, take 0.5 tab at bedtime (qhs). Wk 2, take 1 tab qhs. Wk 3, take 0.5 tab in am/1 tab qhs. Wk 4, take 1 tab twice daily. Wk 5, take 1 tab in am/0.5 tab at noon/1 tab qhs. Wk 6, take 1 tab three times a day. 270 capsule 3     LORazepam (ATIVAN) 0.5 MG tablet TK 1 T PO QD       meclizine (ANTIVERT) 12.5 MG tablet Take 12.5 mg by mouth 3 times daily as needed       metoclopramide (REGLAN) 5 MG tablet TK 1 T PO Q 8 H PRF NAUSEA OR VOM       Multiple Vitamins-Minerals (MULTIVITAMIN ADULT PO)        nitroGLYcerin (NITROSTAT) 0.4 MG sublingual tablet PLACE 1 T UNDER THE TONGUE EVERY 5 MINUTES IF NEED FOR CHEST PAIN       rosuvastatin (CRESTOR) 10 MG tablet TK 1 T PO HS        vitamin B-12 (CYANOCOBALAMIN) 500 MCG tablet Take 500 mcg by mouth daily       busPIRone (BUSPAR) 5 MG tablet TK 1 T PO BID       clonazePAM (KLONOPIN) 0.5 MG tablet Take 0.5 tablets by mouth 2 times daily       dimenhyDRINATE (DRAMAMINE) 50 MG tablet Take 50 mg by mouth as needed       FLUoxetine (PROZAC) 20 MG capsule Take 20 mg by mouth daily       hydrochlorothiazide (HYDRODIURIL) 12.5 MG tablet TK 1 T PO QD       hydrOXYzine (ATARAX) 10 MG tablet TK 1 T PO Q 6 H PRF NAUSEA       lidocaine (LIDODERM) 5 % patch Apply on dry, clean, hairless skin. Apply 1 patch to painful area of skin for up to to 12 hours within 24 hour period.       mirtazapine (REMERON) 7.5 MG tablet TK 1 T PO HS       oxyCODONE-acetaminophen (PERCOCET) 5-325 MG tablet TK 1 T PO Q 4 H IF NEEDED FOR PAIN. NM4       PARoxetine (PAXIL) 10 MG tablet TK 1 T PO QAM       pyridOXINE (VITAMIN B6) 25 MG tablet Take 25 mg by mouth daily           Allergies   Allergen Reactions     Felodipine Shortness Of Breath     Lisinopril Shortness Of Breath     SOB, upset stomach     Metoclopramide Palpitations     Bisoprolol      Other reaction(s): *Unknown  Fatigue     Carvedilol      Other reaction(s): *Unknown  Fatigue     Codeine Nausea     Other reaction(s): GI Upset     Ferrous Sulfate Diarrhea     Hydrocodone-Acetaminophen Nausea     Iron Diarrhea     Other reaction(s): GI Upset     Isosorbide Other (See Comments)     Fatigue and high blood pressure     Metoprolol Other (See Comments)     Flu like symptoms     Morphine Nausea     Primidone Nausea     shakiness  shakiness       Succinylcholine Other (See Comments)     States made her feel terrible  States made her feel terrible       Tramadol Other (See Comments)     Reaction(s) after stopping tramadol: Shakes/shivers/headache/other symptoms she thought were cardiac symptoms that she found were not.          Physical Exam:  The patient's  weight is 55.3 kg (122 lb). Her blood pressure is 106/67 and her pulse  is 70. Her respiration is 15 and oxygen saturation is 98%.    GENERAL: alert, active, attentive, appropriately groomed   HEENT: normocephalic, eyes open with no discharge  CHEST: non labored breathing   PSYCH: mood anxious     Neurologic Exam:  MENTAL STATUS: Alert and oriented to person, place, time, and situation. Follows commands. Recent and remote memory intact. Attention span and concentration intact. Fund of knowledge intact to current events.  SPEECH: Fluent, intact comprehension and articulation  CN: visual fields intact, EOMIB,  no nystagmus, normal saccades, facial movement symmetric, hearing grossly intact to conversation, tongue protrudes midline   MOTOR: Moves all extremities equally against gravity without difficulty  Involuntary movements:   Mild No-no head tremors  Minimal hand tremors observed at posture and wing beat position, no rest tremor  Agility: Normal finger tapping and toe tapping b/l  Tone: Normal axial and appendicular tone  COORDINATION: no dysmetria with FTN, HTS  GAIT: able to rise unassisted from a seated position, normal arm swing and normal stride length, no en bloc turns, no ataxia     Data Reviewed: I have personally reviewed the tests/studies below.   - [facility]:  CT Head 10/29/20 unremarkable  CTA Head Neck 8/11/20 unremarkable   - 12/2020 TSH elevated at 5.3  - 1/2021 labs: BMP, vit B6 elevated at 121.6, Mg, phos, vit D, uric acid, CRP, yudelka neg, iron panel - all WNL unless indicated otherwise     Impression:  Ms. Marinelli is a 65 year old female that presents to Neurology Movement clinic for follow up regarding management of ET.     1. Essential Tremor involving head > hands > legs and, to a lesser, extent voice. She was trialed on low dose primidone due to past intolerance of propranolol but could not tolerate this med (hallucinations and severe nausea to taking 25 mg primidone x 1) thus was stopped. She was restarted on propranolol for tremor control but could not tolerate  SEs with up titration of dose (breathlessness). Hypothyroidism could be contributing. Discussed starting gabapentin slowly for tremor treatment and also onabotulinumtoxin A injections for head tremor. She was amenable to starting gabapentin but would like more time to decide on Botox injections.    2. Anxiety: developed due to poorly controlled tremors and cardiac history   3. Nausea, improving - unclear etiology. Following with Endo and GI for workup and management of this symptom.   3. Autonomic symptoms associated with BM - unclear etiology. Following with Endo.  4. Extensive cardiac history s/p multiple cardiac procedures - follows with cardiology  5. Hypothyroidism    Plan:   - Start gabapentin for treatment of tremor. Follow the titration table below.   Gabapentin 300 mg AM PM Bed   Week 1   150 mg   Week 2   300 mg   Week 3 150 mg  300 mg   Week 4 300 mg  300 mg   Week 5 300 mg  150 mg 300 mg   Week 6 300 mg 300 mg 300 mg    - Common side effects include drowsiness and dizziness. Do not drive until you know how this medication will affect you.   - If you experience SEs, go back to the previous dose. Call the clinic for an update.   - Appropriate for botulinum toxin treatment for head tremor in setting of essential tremor. We discussed benefits and potential adverse effects of this treatment. Patient would time to think about it.  - Call if you are interested and we will send a prior authorization for Botox.   - Patient will discuss with PCP regarding hypothyroidism.     Patient to return in 5 weeks, for in-person visit, 30 minutes.     Elana Sevilla DO, MA   of Neurology   [facility]    55 minutes spent on date of encounter doing chart reviews and exam and documentation and further activities as noted above. Time was a askew factor in today's visit, and greater than 50% of this visit was spent discussing the therapeutic plan, counseling, and coordinating care.              Answers for HPI/ROS submitted by the patient on 1/12/2021   General Symptoms: No  Skin Symptoms: No  HENT Symptoms: No  EYE SYMPTOMS: No  HEART SYMPTOMS: Yes  LUNG SYMPTOMS: Yes  INTESTINAL SYMPTOMS: No  URINARY SYMPTOMS: No  GYNECOLOGIC SYMPTOMS: No  BREAST SYMPTOMS: No  SKELETAL SYMPTOMS: No  BLOOD SYMPTOMS: Yes  NERVOUS SYSTEM SYMPTOMS: Yes  MENTAL HEALTH SYMPTOMS: Yes  Cough: No  Sputum or phlegm: No  Coughing up blood: No  Difficulty breating or shortness of breath: Yes  Snoring: No  Wheezing: No  Difficulty breathing on exertion: Yes  Nighttime Cough: No  Difficulty breathing when lying flat: Yes  Chest pain or pressure: No  Fast or irregular heartbeat: No  Pain in legs with walking: No  Trouble breathing while lying down: Yes  Fingers or toes appear blue: No  High blood pressure: Yes  Low blood pressure: Yes  Fainting: No  Murmurs: No  Pacemaker: No  Varicose veins: No  Edema or swelling: No  Wake up at night with shortness of breath: No  Light-headedness: No  Exercise intolerance: Yes  Anemia: Yes  Swollen glands: No  Easy bleeding or bruising: No  Trouble with coordination: No  Dizziness or trouble with balance: No  Fainting or black-out spells: No  Memory loss: No  Headache: Yes  Seizures: No  Speech problems: No  Tingling: No  Tremor: Yes  Weakness: Yes  Difficulty walking: No  Paralysis: No  Numbness: No  Nervous or Anxious: Yes  Depression: Yes  Trouble sleeping: Yes  Trouble thinking or concentrating: No  Mood changes: No  Panic attacks: No        Again, thank you for allowing me to participate in the care of your patient.      Sincerely,    Elana Sevilla DO

## 2021-01-15 NOTE — NURSING NOTE
Chief Complaint   Patient presents with     RECHECK     [facility] RETURN MOVEMENT DISORDER F/U        Henry Doyle, EMT

## 2021-01-15 NOTE — PROGRESS NOTES
Department of Neurology  Movement Disorders Division     Patient: Aury Marinelli   MRN: [id number]   : 1955   Date of Visit: 01/15/21    REASON FOR VISIT: follow up for management of ET  Referring Physician: self referral     History of Present Illness  Ms. Marinelli is a 65 year old female that presents to Neurology Movement clinic for follow up regarding management of ET.  The patient was last seen on 2020 and diagnosed with ET. She was recommended to start primidone due to poor tolerance to propranolol but was not able to tolerate low dose primidone and thus it was stopped. Patient reported hallucinations and severe nausea to taking 25 mg primidone x 1. She was restarted on propranolol 10 mg 0.5 tab bid, per PCP. At the above visit, we also discussed  Botox injections for treatment of head tremor. Patient was not interested as this would not treat internal and body tremors.     Since this visit, she has been stopped on ASA via Dr. Bates, cardiologist, and continues to be on clopidogrel. She was seen by GI and concerns were expressed regarding mesenteric ischemia as the etiology of her chronic nausea. Thus evaluation with CTA of abd pelvis was ordered to evaluate for any stenosis of mesenteric arteries. She was evaluated by [facility] whom did not feel this was an issue that required surgery.     History obtained from patient and , Leonard. Today her main complaint is internal tremors, especially of her core which is then associated with aches of her ribs and back.  She also has tremors of her head which is associated with neck and upper back stiffness and aches.  As stated above, she was trialed on propranolol however this medication was stopped she cannot tolerate side effects.  She noted that being on this medication caused her to become breathless and did not notice an improvement with tremors.  She continues to take Ativan as needed which helps her calm down for at least 2 to 3 hours and  improves the tremors.    Patient reports that nausea has vastly improved and is uncertain as to why.  She is able to eat and is gaining weight.  The only change she can recollect is a cardiac stent placement about 3 to 4 weeks ago.  She has also been told that she is anemic and has started iron infusion therapy.    Review of Systems:  Other than that mentioned above, the remainder of 12 systems reviewed were negative.    PMH: unchanged  PSH: unchanged  FH: unchanged  SH: unchanged    Medications:  Current Outpatient Medications   Medication Sig Dispense Refill     acetaminophen (TYLENOL) 325 MG tablet Take 1,000 mg by mouth       aspirin (ASA) 81 MG EC tablet Take 81 mg by mouth       cloNIDine (CATAPRES) 0.1 MG tablet TK 1 T PO TID IF NEEDED FOR BLOOD PRESSURE GREATER THAN 150/90       clopidogrel (PLAVIX) 75 MG tablet Take 75 mg by mouth       felodipine ER (PLENDIL) 2.5 MG 24 hr tablet TK 1 T PO QD       gabapentin (NEURONTIN) 300 MG capsule Wk 1, take 0.5 tab at bedtime (qhs). Wk 2, take 1 tab qhs. Wk 3, take 0.5 tab in am/1 tab qhs. Wk 4, take 1 tab twice daily. Wk 5, take 1 tab in am/0.5 tab at noon/1 tab qhs. Wk 6, take 1 tab three times a day. 270 capsule 3     LORazepam (ATIVAN) 0.5 MG tablet TK 1 T PO QD       meclizine (ANTIVERT) 12.5 MG tablet Take 12.5 mg by mouth 3 times daily as needed       metoclopramide (REGLAN) 5 MG tablet TK 1 T PO Q 8 H PRF NAUSEA OR VOM       Multiple Vitamins-Minerals (MULTIVITAMIN ADULT PO)        nitroGLYcerin (NITROSTAT) 0.4 MG sublingual tablet PLACE 1 T UNDER THE TONGUE EVERY 5 MINUTES IF NEED FOR CHEST PAIN       rosuvastatin (CRESTOR) 10 MG tablet TK 1 T PO HS       vitamin B-12 (CYANOCOBALAMIN) 500 MCG tablet Take 500 mcg by mouth daily       busPIRone (BUSPAR) 5 MG tablet TK 1 T PO BID       clonazePAM (KLONOPIN) 0.5 MG tablet Take 0.5 tablets by mouth 2 times daily       dimenhyDRINATE (DRAMAMINE) 50 MG tablet Take 50 mg by mouth as needed       FLUoxetine (PROZAC)  20 MG capsule Take 20 mg by mouth daily       hydrochlorothiazide (HYDRODIURIL) 12.5 MG tablet TK 1 T PO QD       hydrOXYzine (ATARAX) 10 MG tablet TK 1 T PO Q 6 H PRF NAUSEA       lidocaine (LIDODERM) 5 % patch Apply on dry, clean, hairless skin. Apply 1 patch to painful area of skin for up to to 12 hours within 24 hour period.       mirtazapine (REMERON) 7.5 MG tablet TK 1 T PO HS       oxyCODONE-acetaminophen (PERCOCET) 5-325 MG tablet TK 1 T PO Q 4 H IF NEEDED FOR PAIN. NM4       PARoxetine (PAXIL) 10 MG tablet TK 1 T PO QAM       pyridOXINE (VITAMIN B6) 25 MG tablet Take 25 mg by mouth daily           Allergies   Allergen Reactions     Felodipine Shortness Of Breath     Lisinopril Shortness Of Breath     SOB, upset stomach     Metoclopramide Palpitations     Bisoprolol      Other reaction(s): *Unknown  Fatigue     Carvedilol      Other reaction(s): *Unknown  Fatigue     Codeine Nausea     Other reaction(s): GI Upset     Ferrous Sulfate Diarrhea     Hydrocodone-Acetaminophen Nausea     Iron Diarrhea     Other reaction(s): GI Upset     Isosorbide Other (See Comments)     Fatigue and high blood pressure     Metoprolol Other (See Comments)     Flu like symptoms     Morphine Nausea     Primidone Nausea     shakiness  shakiness       Succinylcholine Other (See Comments)     States made her feel terrible  States made her feel terrible       Tramadol Other (See Comments)     Reaction(s) after stopping tramadol: Shakes/shivers/headache/other symptoms she thought were cardiac symptoms that she found were not.          Physical Exam:  The patient's  weight is 55.3 kg (122 lb). Her blood pressure is 106/67 and her pulse is 70. Her respiration is 15 and oxygen saturation is 98%.    GENERAL: alert, active, attentive, appropriately groomed   HEENT: normocephalic, eyes open with no discharge  CHEST: non labored breathing   PSYCH: mood anxious     Neurologic Exam:  MENTAL STATUS: Alert and oriented to person, place, time, and  situation. Follows commands. Recent and remote memory intact. Attention span and concentration intact. Fund of knowledge intact to current events.  SPEECH: Fluent, intact comprehension and articulation  CN: visual fields intact, EOMIB,  no nystagmus, normal saccades, facial movement symmetric, hearing grossly intact to conversation, tongue protrudes midline   MOTOR: Moves all extremities equally against gravity without difficulty  Involuntary movements:   Mild No-no head tremors  Minimal hand tremors observed at posture and wing beat position, no rest tremor  Agility: Normal finger tapping and toe tapping b/l  Tone: Normal axial and appendicular tone  COORDINATION: no dysmetria with FTN, HTS  GAIT: able to rise unassisted from a seated position, normal arm swing and normal stride length, no en bloc turns, no ataxia     Data Reviewed: I have personally reviewed the tests/studies below.   - [facility]:  CT Head 10/29/20 unremarkable  CTA Head Neck 8/11/20 unremarkable   - 12/2020 TSH elevated at 5.3  - 1/2021 labs: BMP, vit B6 elevated at 121.6, Mg, phos, vit D, uric acid, CRP, yudelka neg, iron panel - all WNL unless indicated otherwise     Impression:  Ms. Marinelli is a 65 year old female that presents to Neurology Movement clinic for follow up regarding management of ET.     1. Essential Tremor involving head > hands > legs and, to a lesser, extent voice. She was trialed on low dose primidone due to past intolerance of propranolol but could not tolerate this med (hallucinations and severe nausea to taking 25 mg primidone x 1) thus was stopped. She was restarted on propranolol for tremor control but could not tolerate SEs with up titration of dose (breathlessness). Hypothyroidism could be contributing. Discussed starting gabapentin slowly for tremor treatment and also onabotulinumtoxin A injections for head tremor. She was amenable to starting gabapentin but would like more time to decide on Botox injections.    2.  Anxiety: developed due to poorly controlled tremors and cardiac history   3. Nausea, improving - unclear etiology. Following with Endo and GI for workup and management of this symptom.   4. Autonomic symptoms associated with Bowel Movements - Unclear etiology. When shaking occurs, she reports experiencing persistent nausea and autonomic effects such as sweating, diaphoresis and palpitations. She notices the autonomic symptoms are associated with bowel movements.   5. Extensive cardiac history s/p multiple cardiac procedures - follows with cardiology  6. Hypothyroidism    Plan:   - Start gabapentin for treatment of tremor. Follow the titration table below.   Gabapentin 300 mg AM PM Bed   Week 1   150 mg   Week 2   300 mg   Week 3 150 mg  300 mg   Week 4 300 mg  300 mg   Week 5 300 mg  150 mg 300 mg   Week 6 300 mg 300 mg 300 mg    - Common side effects include drowsiness and dizziness. Do not drive until you know how this medication will affect you.   - If you experience SEs, go back to the previous dose. Call the clinic for an update.   - Appropriate for botulinum toxin treatment for head tremor in setting of essential tremor. We discussed benefits and potential adverse effects of this treatment. Patient would time to think about it.  - Call if you are interested and we will send a prior authorization for Botox.   - Patient will discuss with PCP regarding hypothyroidism.     Patient to return in 5 weeks, for in-person visit, 30 minutes.     Elana Sevilla DO MA   of Neurology   [facility]    55 minutes spent on date of encounter doing chart reviews and exam and documentation and further activities as noted above. Time was a askew factor in today's visit, and greater than 50% of this visit was spent discussing the therapeutic plan, counseling, and coordinating care.             Answers for HPI/ROS submitted by the patient on 1/12/2021   General Symptoms: No  Skin Symptoms:  No  HENT Symptoms: No  EYE SYMPTOMS: No  HEART SYMPTOMS: Yes  LUNG SYMPTOMS: Yes  INTESTINAL SYMPTOMS: No  URINARY SYMPTOMS: No  GYNECOLOGIC SYMPTOMS: No  BREAST SYMPTOMS: No  SKELETAL SYMPTOMS: No  BLOOD SYMPTOMS: Yes  NERVOUS SYSTEM SYMPTOMS: Yes  MENTAL HEALTH SYMPTOMS: Yes  Cough: No  Sputum or phlegm: No  Coughing up blood: No  Difficulty breating or shortness of breath: Yes  Snoring: No  Wheezing: No  Difficulty breathing on exertion: Yes  Nighttime Cough: No  Difficulty breathing when lying flat: Yes  Chest pain or pressure: No  Fast or irregular heartbeat: No  Pain in legs with walking: No  Trouble breathing while lying down: Yes  Fingers or toes appear blue: No  High blood pressure: Yes  Low blood pressure: Yes  Fainting: No  Murmurs: No  Pacemaker: No  Varicose veins: No  Edema or swelling: No  Wake up at night with shortness of breath: No  Light-headedness: No  Exercise intolerance: Yes  Anemia: Yes  Swollen glands: No  Easy bleeding or bruising: No  Trouble with coordination: No  Dizziness or trouble with balance: No  Fainting or black-out spells: No  Memory loss: No  Headache: Yes  Seizures: No  Speech problems: No  Tingling: No  Tremor: Yes  Weakness: Yes  Difficulty walking: No  Paralysis: No  Numbness: No  Nervous or Anxious: Yes  Depression: Yes  Trouble sleeping: Yes  Trouble thinking or concentrating: No  Mood changes: No  Panic attacks: No

## 2021-01-18 ENCOUNTER — MYC MEDICAL ADVICE (OUTPATIENT)
Dept: NEUROLOGY | Facility: CLINIC | Age: 66
End: 2021-01-18

## 2021-01-22 NOTE — TELEPHONE ENCOUNTER
I don't want to give up on gabapentin too soon. Patient had a good response on a low dose of this med. Of the options for tremor treatment, this is the most benign thus my hesitation to switch too prematurely. I suggest that she go back to the previous dose of gabapentin 150 mg at bedtime and observe her symptoms on this dose for a full 2 weeks. She reported good results and no SEs to this dose. Then increase to 150 mg twice a day and observe for 2 weeks. She should message the clinic for an update.     Elana Sevilla DO   of Neurology   [facility]

## 2021-01-26 ENCOUNTER — MYC MEDICAL ADVICE (OUTPATIENT)
Dept: NEUROLOGY | Facility: CLINIC | Age: 66
End: 2021-01-26

## 2021-01-29 NOTE — CONFIDENTIAL NOTE
In response to the patient's message, I just want to clarify that she was not able to toelrate gabapentin 300 mg half a tab, correct? We are unfortunatley running out of options for tremor treatment. An option we could try is topiramate, however, this med is associated with loss of appetite and weight loss and she should be aware of this. She could follow the titration schedule below.   Week 1-2: 25 mg (1/2 tablet) at bedtime.  Week 3-4: 25 mg (1/2 tablet) in AM & at bedtime.  Week 5-6: 25 mg (1/2 tablet)  in AM & 50 mg (1 tablet) at bedtime.  Week 7-8: 50 mg (1 tablet) in AM & at bedtime.  - Side effects she may experience include kidney stones, dizziness, loss of appetite, weight loss, word finding difficulties, memory impairment.   - She should stay hydrated on this medication.     Please let me know if she would like to try this. Also, let me know if she has decided on Botox injections.     Elana Sevilla DO   of Neurology   [facility]

## 2021-01-31 ENCOUNTER — MYC MEDICAL ADVICE (OUTPATIENT)
Dept: NEUROLOGY | Facility: CLINIC | Age: 66
End: 2021-01-31

## 2021-02-02 ENCOUNTER — MYC MEDICAL ADVICE (OUTPATIENT)
Dept: NEUROLOGY | Facility: CLINIC | Age: 66
End: 2021-02-02

## 2021-02-02 DIAGNOSIS — G25.0 ESSENTIAL TREMOR: Primary | ICD-10-CM

## 2021-02-02 RX ORDER — BACLOFEN 10 MG/1
TABLET ORAL
Qty: 60 TABLET | Refills: 3 | Status: SHIPPED | OUTPATIENT
Start: 2021-02-02 | End: 2021-02-18 | Stop reason: SINTOL

## 2021-02-03 NOTE — CONFIDENTIAL NOTE
Patient unable to tolerate gabapentin for tremor treatment. Could consider baclofen 5 mg qday x 7 days, then 5 mg BID x 7 days, then 5 mg qam/10 mg qpm x 7 days then 10 mg BID. Stop at dose that improves tremor. SEs include sleepiness, fatigue that she should watch for. Prescription sent to patient's pharmacy.    Elana Sevilla DO   of Neurology   [facility]

## 2021-02-18 ENCOUNTER — MYC MEDICAL ADVICE (OUTPATIENT)
Dept: NEUROLOGY | Facility: CLINIC | Age: 66
End: 2021-02-18

## 2021-02-18 DIAGNOSIS — G25.0 ESSENTIAL TREMOR: Primary | ICD-10-CM

## 2021-02-18 RX ORDER — GABAPENTIN 100 MG/1
CAPSULE ORAL
Qty: 180 CAPSULE | Refills: 4 | Status: SHIPPED | OUTPATIENT
Start: 2021-02-18 | End: 2021-03-04

## 2021-02-18 NOTE — CONFIDENTIAL NOTE
Patient could not tolerate baclofen. She notices an improvement in tremors (external and internal) with gabapentin but is having trouble breaking pills. I have prescribed gabapentin 100 mg capsules. Follow the titration table below:  Gabapentin 100 mg capsules                 AM PM   Week 1 1 1   Week  2                   2 1   Week 3                            2 2   Week 4 3 2   Week 5  3 3     - Stop at dose that improves symptoms.    - Common side effects include drowsiness and dizziness. Do not drive until you know how this medication will affect you.              - If you experience SEs, go back to the previous dose.    Elana Sevilla DO   of Neurology   [facility]

## 2021-02-23 ASSESSMENT — ENCOUNTER SYMPTOMS
LOSS OF CONSCIOUSNESS: 0
DECREASED CONCENTRATION: 0
INSOMNIA: 0
BLOATING: 0
DEPRESSION: 0
PANIC: 0
BACK PAIN: 1
TINGLING: 0
JOINT SWELLING: 0
VOMITING: 0
SEIZURES: 0
MEMORY LOSS: 0
NUMBNESS: 0
NERVOUS/ANXIOUS: 1
ABDOMINAL PAIN: 0
MUSCLE CRAMPS: 0
MUSCLE WEAKNESS: 0
JAUNDICE: 0
HEADACHES: 1
NAUSEA: 0
DIZZINESS: 1
DIARRHEA: 0
NECK PAIN: 1
HEARTBURN: 0
WEAKNESS: 0
RECTAL PAIN: 0
SPEECH CHANGE: 0
MYALGIAS: 1
BOWEL INCONTINENCE: 0
STIFFNESS: 0
ARTHRALGIAS: 0
DISTURBANCES IN COORDINATION: 0
CONSTIPATION: 0
TREMORS: 1
BLOOD IN STOOL: 0
PARALYSIS: 0

## 2021-02-24 ENCOUNTER — TELEPHONE (OUTPATIENT)
Dept: NEUROLOGY | Facility: CLINIC | Age: 66
End: 2021-02-24

## 2021-02-24 NOTE — TELEPHONE ENCOUNTER
MTM referral from: [facility] visit (referral by provider)    MTM referral outreach attempt #1 on February 24, 2021 at 3:08 PM      Outcome: Patient is not interested at this time because of cost. Due to insurance, patient is private pay, will route to MTM Pharmacist/Provider as an FYI. Thank you for the referral.     Jimi Ashley, MTM coordinator

## 2021-02-26 ENCOUNTER — OFFICE VISIT (OUTPATIENT)
Dept: NEUROLOGY | Facility: CLINIC | Age: 66
End: 2021-02-26
Payer: COMMERCIAL

## 2021-02-26 VITALS
DIASTOLIC BLOOD PRESSURE: 72 MMHG | WEIGHT: 123 LBS | OXYGEN SATURATION: 97 % | RESPIRATION RATE: 16 BRPM | HEART RATE: 73 BPM | SYSTOLIC BLOOD PRESSURE: 128 MMHG

## 2021-02-26 DIAGNOSIS — G25.0 ESSENTIAL TREMOR: Primary | ICD-10-CM

## 2021-02-26 DIAGNOSIS — R45.89 ANXIETY ABOUT HEALTH: ICD-10-CM

## 2021-02-26 DIAGNOSIS — D50.8 OTHER IRON DEFICIENCY ANEMIA: ICD-10-CM

## 2021-02-26 PROCEDURE — 99417 PROLNG OP E/M EACH 15 MIN: CPT | Performed by: PSYCHIATRY & NEUROLOGY

## 2021-02-26 PROCEDURE — 99215 OFFICE O/P EST HI 40 MIN: CPT | Performed by: PSYCHIATRY & NEUROLOGY

## 2021-02-26 RX ORDER — PROPRANOLOL HYDROCHLORIDE 10 MG/1
TABLET ORAL
COMMUNITY
Start: 2021-02-03

## 2021-02-26 ASSESSMENT — PAIN SCALES - GENERAL: PAINLEVEL: NO PAIN (0)

## 2021-02-26 NOTE — NURSING NOTE
Chief Complaint   Patient presents with     RECHECK     [facility] RETURN MOVEMENT DISORDER      Denys Rick

## 2021-02-26 NOTE — PATIENT INSTRUCTIONS
Plan:   - Nutritionist referral through PCP  - Address anemia, which maybe casuing nausea  - Start gabapentin liquid form, start with 50 mg qam, increase 50 mg weekly to 200 mg qam. Stop at dose that improves symptoms.   Gabapentin 250 mg/5mL AM   Week 1                           1 mL   Week 2                   2 mL   Week 3                     3 mL   Week 4 4 mL   - Continue ativan as needed.   - You are considering olanzapine, but avoid starting 2 meds at the same time.     Patient to return in 4 weeks months, for in-person visit, 30 minutes.

## 2021-02-26 NOTE — LETTER
"2021       RE: Aury Marinelli  [address] [place] [address]     Dear Colleague,    Thank you for referring your patient, Aury Marinelli, to the [facility] [place] [facility]. Please see a copy of my visit note below.    Department of Neurology  Movement Disorders Division     Patient: Aury Marinelli   MRN: [id number]   : 1955   Date of Visit: 21    Reason for visit: follow up for management of ET    History of Present Illness  Ms. Marinelli is a 65 year old female that presents to Neurology Movement clinic for follow up regarding management of ET.  Patient was last seen on 1/15/21 where she was started on gabapentin for tremor treatment.     History obtained from patient and , Leonard. Today her main complaint continues to be tremors, internal > external. Tremors are exacerbated by nausea and anxiety. Patient offered [facility] pharmacy referral due to intolerance/sensitivity to many medications in setting of a complicated medical history but patient is not interested at this time because of cost.    Trialed gabapentin but she developed SEs of \"bad nausea and stomach cramps\". She was then trialed on baclofen 10 mg with a slow titration to 10mg BID but developed \"bad headaches and dizziness\" on 5 mg qday. She stated that she did notice an improvement in tremors (external and internal) with gabapentin thus she was restarted on this med. She continues to take Ativan as needed which helps her calm down for at least 2 to 3 hours and improves the tremors.    She states that gabapentin 100 mg once a day helped with internal shaking, as well as hand tremors. However, she developed nausea on day 7 and stopped taking this medication. Consequently, she had also started Olanzapine around the same time and she has stopped this medication also.     Patient reports that nausea had resolved but has returned in the last " 2-3 weeks. She suspects that her anemia is returning based on her symptoms of nausea and fatigue, which were the same symptoms she had experienced prior to be diagnosed with anemia. She states that she stopped taking her iron supplements. She associates the nausea to anemia.     Failed treatments:  - Primidone: was not able to tolerate low dose primidone and thus it was stopped. Patient reported hallucinations and severe nausea to taking 25 mg primidone x 1.  - Propranolol 10 mg 0.5 tab bid caused her to become breathless and did not notice an improvement with tremors.    Other history:  Since this visit, she has been stopped on ASA via Dr. Bates, cardiologist, and continues to be on clopidogrel. She was seen by GI and concerns were expressed regarding mesenteric ischemia as the etiology of her chronic nausea. Thus evaluation with CTA of abd pelvis was ordered to evaluate for any stenosis of mesenteric arteries. She was evaluated by [facility] whom did not feel this was an issue that required surgery. The only change she can recollect is a cardiac stent placement about 3 to 4 weeks ago.  She has also been told that she is anemic and has started iron infusion therapy.    Review of Systems:  Other than that mentioned above, the remainder of 12 systems reviewed were negative.    PMH: unchanged  PSH: unchanged  FH: unchanged  SH: unchanged    Medications:  Current Outpatient Medications   Medication Sig Dispense Refill     acetaminophen (TYLENOL) 325 MG tablet Take 1,000 mg by mouth       aspirin (ASA) 81 MG EC tablet Take 81 mg by mouth       cloNIDine (CATAPRES) 0.1 MG tablet TK 1 T PO TID IF NEEDED FOR BLOOD PRESSURE GREATER THAN 150/90       clopidogrel (PLAVIX) 75 MG tablet Take 75 mg by mouth       gabapentin (NEURONTIN) 250 MG/5ML solution Start with 50 mg qam, increase 50 mg weekly to 200 mg qam. Stop at dose that improves symptoms. 360 mL 4     LORazepam (ATIVAN) 0.5 MG tablet TK 1 T PO QD       meclizine  (ANTIVERT) 12.5 MG tablet Take 12.5 mg by mouth 3 times daily as needed       Multiple Vitamins-Minerals (MULTIVITAMIN ADULT PO)        nitroGLYcerin (NITROSTAT) 0.4 MG sublingual tablet PLACE 1 T UNDER THE TONGUE EVERY 5 MINUTES IF NEED FOR CHEST PAIN       propranolol (INDERAL) 10 MG tablet 1/4 tab oral twice a day       rosuvastatin (CRESTOR) 10 MG tablet TK 1 T PO HS       vitamin B-12 (CYANOCOBALAMIN) 500 MCG tablet Take 500 mcg by mouth daily       gabapentin (NEURONTIN) 100 MG capsule Week 1, take 1 tab twice a day (BID). Wk 2, take 2 tabs in the AM, 1 tab in the PM. Wk 3, take 2 tabs BID. Wk 4, take 3 tabs in AM, 2 tabs in PM. Wk 4, take 3 tabs BID. Stop at dose that improves symptoms. 180 capsule 4          Movement Disorder-related Medications                          Gabapentin 100 mg - stopped taking        Ativan 0.5 mg prn                                Allergies   Allergen Reactions     Felodipine Shortness Of Breath     Lisinopril Shortness Of Breath     SOB, upset stomach     Metoclopramide Palpitations     Bisoprolol      Other reaction(s): *Unknown  Fatigue     Carvedilol      Other reaction(s): *Unknown  Fatigue     Codeine Nausea     Other reaction(s): GI Upset     Ferrous Sulfate Diarrhea     Hydrocodone-Acetaminophen Nausea     Iron Diarrhea     Other reaction(s): GI Upset     Isosorbide Other (See Comments)     Fatigue and high blood pressure     Metoprolol Other (See Comments)     Flu like symptoms     Morphine Nausea     Primidone Nausea     shakiness  shakiness       Succinylcholine Other (See Comments)     States made her feel terrible  States made her feel terrible       Tramadol Other (See Comments)     Reaction(s) after stopping tramadol: Shakes/shivers/headache/other symptoms she thought were cardiac symptoms that she found were not.          Physical Exam:  The patient's  weight is 55.8 kg (123 lb). Her blood pressure is 128/72 and her pulse is 73. Her respiration is 16 and oxygen  saturation is 97%.    Physical Exam:  GENERAL: alert, active, attentive, appropriately groomed   HEENT: normocephalic, eyes open with no discharge, nares patent, oropharynx clear-no lesions  CHEST: normal configuration, chest rise equal b/l, non labored breathing   EXTREMITIES: no edema/cyanosis in BUE/BLE  PSYCH: mood anxious      Neurologic Exam:  MENTAL STATUS: Alert and oriented to person, place, time, and situation. Follows commands. Recent and remote memory intact. Attention span and concentration intact. Fund of knowledge intact to current events.   SPEECH: Fluent, intact comprehension and articulation  CN: visual fields intact in all fields, EOMIB, facial movement symmetric, hearing grossly intact to conversation, tongue protrudes midline   MOTOR: Moves all extremities equally against gravity without difficulty   Involuntary movements:   Minimal to no tremor observed in hands outstretched or in wingbeat  No-no Head tremor less than 1 cm   No vocal tremor  Agility: Normal finger tapping and toe tapping b/l  Tone: Normal axial and appendicular tone  COORDINATION: no dysmetria with FTN, HTS  GAIT: able to rise unassisted from a seated position, normal arm swing and normal stride length, no en bloc turns, no ataxia    Impression: Ms. Marinelli is a 65 year old female that presents to Neurology Movement clinic for follow up regarding management of ET. Today her main complaint continues to be tremors, internal > external. Tremors are exacerbated by nausea and anxiety. She reported some improvement on gabapentin but developed nausea on day 7. We discussed that nausea is likely not associated with gabapentin due to the delayed onset of this AE. Plan to continue gabapentin though switch to liquid form to try smaller doses with a slow titration. She suspects that the nausea is associated with her anemia. She will address her nausea with her PCP, with Endo and GI.      1. Essential Tremor involving head > hands > legs  "and, to a lesser, extent voice. She was trialed on low dose primidone due to past intolerance of propranolol but could not tolerate this med (hallucinations and severe nausea to taking 25 mg primidone x 1) thus was stopped. She has tried propranolol for tremor control but could not tolerate SEs with up titration of dose (breathlessness). Hypothyroidism could be contributing. Trialed on baclofen 10 mg with a slow titration to 10mg BID but developed \"bad headaches and dizziness\" on 5 mg qday. She stated that she did notice an improvement in tremors (external and internal) with gabapentin thus she was restarted on this med after stopping it due to \"bad nausea and stomach cramps\". She continues to take Ativan as needed which helps her calm down for at least 2 to 3 hours and improves the tremors. I have discussed onabotulinumtoxin A injections for head tremor but she is not interested as her head tremor is not bothersome and this treatment would not address the internal tremors. Patient offered [facility] pharmacy referral due to intolerance/sensitivity to many medications in setting of a complicated medical history but patient is not interested at this time because of cost.  2. Anxiety: developed due to poorly controlled tremors and cardiac history   3. Nausea- Unclear etiology. Following with Endo and GI for workup and management of this symptom.   3. Autonomic symptoms associated with BM - Unclear etiology. Following with Endo.  4. Extensive cardiac history s/p multiple cardiac procedures - follows with Cardiology  5. Hypothyroidism  6. Anemia     Plan:   - Nutritionist referral through PCP  - Address anemia, which maybe casuing nausea  - Start gabapentin liquid form, start with 50 mg qam, increase 50 mg weekly to 200 mg qam. Stop at dose that improves symptoms.   Gabapentin 250 mg/5mL AM   Week 1                           1 mL   Week 2                   2 mL   Week 3                     3 mL   Week 4 4 mL   - Continue " Ativan as needed.   - You are considering olanzapine, but avoid starting 2 meds at the same time.     Patient to return in 4 weeks months, for in-person visit, 30 minutes.     Elana Sevilla DO, MA   of Neurology   [facility]    106 minutes spent on date of encounter doing chart reviews and exam and documentation and further activities as noted above.                  Answers for HPI/ROS submitted by the patient on 2/23/2021   General Symptoms: No  Skin Symptoms: No  HENT Symptoms: No  EYE SYMPTOMS: No  HEART SYMPTOMS: No  LUNG SYMPTOMS: No  INTESTINAL SYMPTOMS: Yes  URINARY SYMPTOMS: No  GYNECOLOGIC SYMPTOMS: No  BREAST SYMPTOMS: No  SKELETAL SYMPTOMS: Yes  BLOOD SYMPTOMS: No  NERVOUS SYSTEM SYMPTOMS: Yes  MENTAL HEALTH SYMPTOMS: Yes  Heart burn or indigestion: No  Nausea: No  Vomiting: No  Abdominal pain: No  Bloating: No  Constipation: No  Diarrhea: No  Blood in stool: No  Black stools: No  Rectal or Anal pain: No  Fecal incontinence: No  Yellowing of skin or eyes: No  Vomit with blood: No  Change in stools: No  Back pain: Yes  Muscle aches: Yes  Neck pain: Yes  Swollen joints: No  Joint pain: No  Bone pain: No  Muscle cramps: No  Muscle weakness: No  Joint stiffness: No  Bone fracture: No  Trouble with coordination: No  Dizziness or trouble with balance: Yes  Fainting or black-out spells: No  Memory loss: No  Headache: Yes  Seizures: No  Speech problems: No  Tingling: No  Tremor: Yes  Weakness: No  Difficulty walking: No  Paralysis: No  Numbness: No  Nervous or Anxious: Yes  Depression: No  Trouble sleeping: No  Trouble thinking or concentrating: No  Mood changes: No  Panic attacks: No        Again, thank you for allowing me to participate in the care of your patient.      Sincerely,    Elana Sevilla DO

## 2021-02-26 NOTE — PROGRESS NOTES
"Department of Neurology  Movement Disorders Division     Patient: Aury Marinelli   MRN: [id number]   : 1955   Date of Visit: 21    Reason for visit: follow up for management of ET    History of Present Illness  Ms. Marinelli is a 65 year old female that presents to Neurology Movement clinic for follow up regarding management of ET.  Patient was last seen on 1/15/21 where she was started on gabapentin for tremor treatment.     History obtained from patient and , Leonard. Today her main complaint continues to be tremors, internal > external. Tremors are exacerbated by nausea and anxiety. Patient offered [facility] pharmacy referral due to intolerance/sensitivity to many medications in setting of a complicated medical history but patient is not interested at this time because of cost.    Trialed gabapentin but she developed SEs of \"bad nausea and stomach cramps\". She was then trialed on baclofen 10 mg with a slow titration to 10mg BID but developed \"bad headaches and dizziness\" on 5 mg qday. She stated that she did notice an improvement in tremors (external and internal) with gabapentin thus she was restarted on this med. She continues to take Ativan as needed which helps her calm down for at least 2 to 3 hours and improves the tremors.    She states that gabapentin 100 mg once a day helped with internal shaking, as well as hand tremors. However, she developed nausea on day 7 and stopped taking this medication. Consequently, she had also started Olanzapine around the same time and she has stopped this medication also.     Patient reports that nausea had resolved but has returned in the last 2-3 weeks. She suspects that her anemia is returning based on her symptoms of nausea and fatigue, which were the same symptoms she had experienced prior to be diagnosed with anemia. She states that she stopped taking her iron supplements. She associates the nausea to anemia.     Failed treatments:  - Primidone: was " not able to tolerate low dose primidone and thus it was stopped. Patient reported hallucinations and severe nausea to taking 25 mg primidone x 1.  - Propranolol 10 mg 0.5 tab bid caused her to become breathless and did not notice an improvement with tremors.    Other history:  Since this visit, she has been stopped on ASA via Dr. Bates, cardiologist, and continues to be on clopidogrel. She was seen by GI and concerns were expressed regarding mesenteric ischemia as the etiology of her chronic nausea. Thus evaluation with CTA of abd pelvis was ordered to evaluate for any stenosis of mesenteric arteries. She was evaluated by [facility] whom did not feel this was an issue that required surgery. The only change she can recollect is a cardiac stent placement about 3 to 4 weeks ago.  She has also been told that she is anemic and has started iron infusion therapy.    Review of Systems:  Other than that mentioned above, the remainder of 12 systems reviewed were negative.    PMH: unchanged  PSH: unchanged  FH: unchanged  SH: unchanged    Medications:  Current Outpatient Medications   Medication Sig Dispense Refill     acetaminophen (TYLENOL) 325 MG tablet Take 1,000 mg by mouth       aspirin (ASA) 81 MG EC tablet Take 81 mg by mouth       cloNIDine (CATAPRES) 0.1 MG tablet TK 1 T PO TID IF NEEDED FOR BLOOD PRESSURE GREATER THAN 150/90       clopidogrel (PLAVIX) 75 MG tablet Take 75 mg by mouth       gabapentin (NEURONTIN) 250 MG/5ML solution Start with 50 mg qam, increase 50 mg weekly to 200 mg qam. Stop at dose that improves symptoms. 360 mL 4     LORazepam (ATIVAN) 0.5 MG tablet TK 1 T PO QD       meclizine (ANTIVERT) 12.5 MG tablet Take 12.5 mg by mouth 3 times daily as needed       Multiple Vitamins-Minerals (MULTIVITAMIN ADULT PO)        nitroGLYcerin (NITROSTAT) 0.4 MG sublingual tablet PLACE 1 T UNDER THE TONGUE EVERY 5 MINUTES IF NEED FOR CHEST PAIN       propranolol (INDERAL) 10 MG tablet 1/4 tab oral twice a day        rosuvastatin (CRESTOR) 10 MG tablet TK 1 T PO HS       vitamin B-12 (CYANOCOBALAMIN) 500 MCG tablet Take 500 mcg by mouth daily       gabapentin (NEURONTIN) 100 MG capsule Week 1, take 1 tab twice a day (BID). Wk 2, take 2 tabs in the AM, 1 tab in the PM. Wk 3, take 2 tabs BID. Wk 4, take 3 tabs in AM, 2 tabs in PM. Wk 4, take 3 tabs BID. Stop at dose that improves symptoms. 180 capsule 4          Movement Disorder-related Medications                          Gabapentin 100 mg - stopped taking        Ativan 0.5 mg prn                                Allergies   Allergen Reactions     Felodipine Shortness Of Breath     Lisinopril Shortness Of Breath     SOB, upset stomach     Metoclopramide Palpitations     Bisoprolol      Other reaction(s): *Unknown  Fatigue     Carvedilol      Other reaction(s): *Unknown  Fatigue     Codeine Nausea     Other reaction(s): GI Upset     Ferrous Sulfate Diarrhea     Hydrocodone-Acetaminophen Nausea     Iron Diarrhea     Other reaction(s): GI Upset     Isosorbide Other (See Comments)     Fatigue and high blood pressure     Metoprolol Other (See Comments)     Flu like symptoms     Morphine Nausea     Primidone Nausea     shakiness  shakiness       Succinylcholine Other (See Comments)     States made her feel terrible  States made her feel terrible       Tramadol Other (See Comments)     Reaction(s) after stopping tramadol: Shakes/shivers/headache/other symptoms she thought were cardiac symptoms that she found were not.          Physical Exam:  The patient's  weight is 55.8 kg (123 lb). Her blood pressure is 128/72 and her pulse is 73. Her respiration is 16 and oxygen saturation is 97%.    Physical Exam:  GENERAL: alert, active, attentive, appropriately groomed   HEENT: normocephalic, eyes open with no discharge, nares patent, oropharynx clear-no lesions  CHEST: normal configuration, chest rise equal b/l, non labored breathing   EXTREMITIES: no edema/cyanosis in BUE/BLE  PSYCH: mood  anxious      Neurologic Exam:  MENTAL STATUS: Alert and oriented to person, place, time, and situation. Follows commands. Recent and remote memory intact. Attention span and concentration intact. Fund of knowledge intact to current events.   SPEECH: Fluent, intact comprehension and articulation  CN: visual fields intact in all fields, EOMIB, facial movement symmetric, hearing grossly intact to conversation, tongue protrudes midline   MOTOR: Moves all extremities equally against gravity without difficulty   Involuntary movements:   Minimal to no tremor observed in hands outstretched or in wingbeat  No-no Head tremor less than 1 cm   No vocal tremor  Agility: Normal finger tapping and toe tapping b/l  Tone: Normal axial and appendicular tone  COORDINATION: no dysmetria with FTN, HTS  GAIT: able to rise unassisted from a seated position, normal arm swing and normal stride length, no en bloc turns, no ataxia    Impression: Ms. Marinelli is a 65 year old female that presents to Neurology Movement clinic for follow up regarding management of ET. Today her main complaint continues to be tremors, internal > external. Tremors are exacerbated by nausea and anxiety. She reported some improvement on gabapentin but developed nausea on day 7. We discussed that nausea is likely not associated with gabapentin due to the delayed onset of this AE. Plan to continue gabapentin though switch to liquid form to try smaller doses with a slow titration. She suspects that the nausea is associated with her anemia. She will address her nausea with her PCP, with Endo and GI.      1. Essential Tremor involving head > hands > legs and, to a lesser, extent voice. She was trialed on low dose primidone due to past intolerance of propranolol but could not tolerate this med (hallucinations and severe nausea to taking 25 mg primidone x 1) thus was stopped. She has tried propranolol for tremor control but could not tolerate SEs with up titration of dose  "(breathlessness). Hypothyroidism could be contributing. Trialed on baclofen 10 mg with a slow titration to 10mg BID but developed \"bad headaches and dizziness\" on 5 mg qday. She stated that she did notice an improvement in tremors (external and internal) with gabapentin thus she was restarted on this med after stopping it due to \"bad nausea and stomach cramps\". She continues to take Ativan as needed which helps her calm down for at least 2 to 3 hours and improves the tremors. I have discussed onabotulinumtoxin A injections for head tremor but she is not interested as her head tremor is not bothersome and this treatment would not address the internal tremors. Patient offered [facility] pharmacy referral due to intolerance/sensitivity to many medications in setting of a complicated medical history but patient is not interested at this time because of cost.  2. Anxiety: developed due to poorly controlled tremors and cardiac history   3. Nausea- Unclear etiology. Following with Endo and GI for workup and management of this symptom.   4. Autonomic symptoms associated with Bowel Movements - Unclear etiology. Per history, when shaking occurs, she also experiences persistent nausea and autonomic effects such as sweating, diaphoresis and palpitations. She notices the autonomic symptoms are associated with bowel movements.    5. Extensive cardiac history s/p multiple cardiac procedures - follows with Cardiology  6. Hypothyroidism  7. Anemia     Plan:   - Nutritionist referral through PCP  - Address anemia, which maybe casuing nausea  - Start gabapentin liquid form, start with 50 mg qam, increase 50 mg weekly to 200 mg qam. Stop at dose that improves symptoms.   Gabapentin 250 mg/5mL AM   Week 1                           1 mL   Week 2                   2 mL   Week 3                     3 mL   Week 4 4 mL   - Continue Ativan as needed.   - You are considering olanzapine, but avoid starting 2 meds at the same time.     Patient to " return in 4 weeks months, for in-person visit, 30 minutes.     Elana Sevilla DO, MA   of Neurology   [facility]    106 minutes spent on date of encounter doing chart reviews and exam and documentation and further activities as noted above.                  Answers for HPI/ROS submitted by the patient on 2/23/2021   General Symptoms: No  Skin Symptoms: No  HENT Symptoms: No  EYE SYMPTOMS: No  HEART SYMPTOMS: No  LUNG SYMPTOMS: No  INTESTINAL SYMPTOMS: Yes  URINARY SYMPTOMS: No  GYNECOLOGIC SYMPTOMS: No  BREAST SYMPTOMS: No  SKELETAL SYMPTOMS: Yes  BLOOD SYMPTOMS: No  NERVOUS SYSTEM SYMPTOMS: Yes  MENTAL HEALTH SYMPTOMS: Yes  Heart burn or indigestion: No  Nausea: No  Vomiting: No  Abdominal pain: No  Bloating: No  Constipation: No  Diarrhea: No  Blood in stool: No  Black stools: No  Rectal or Anal pain: No  Fecal incontinence: No  Yellowing of skin or eyes: No  Vomit with blood: No  Change in stools: No  Back pain: Yes  Muscle aches: Yes  Neck pain: Yes  Swollen joints: No  Joint pain: No  Bone pain: No  Muscle cramps: No  Muscle weakness: No  Joint stiffness: No  Bone fracture: No  Trouble with coordination: No  Dizziness or trouble with balance: Yes  Fainting or black-out spells: No  Memory loss: No  Headache: Yes  Seizures: No  Speech problems: No  Tingling: No  Tremor: Yes  Weakness: No  Difficulty walking: No  Paralysis: No  Numbness: No  Nervous or Anxious: Yes  Depression: No  Trouble sleeping: No  Trouble thinking or concentrating: No  Mood changes: No  Panic attacks: No

## 2021-02-27 ENCOUNTER — MYC MEDICAL ADVICE (OUTPATIENT)
Dept: NEUROLOGY | Facility: CLINIC | Age: 66
End: 2021-02-27

## 2021-03-01 RX ORDER — GABAPENTIN 250 MG/5ML
SOLUTION ORAL
Qty: 360 ML | Refills: 4 | Status: SHIPPED | OUTPATIENT
Start: 2021-03-01 | End: 2021-04-02

## 2021-03-05 ENCOUNTER — IMMUNIZATION (OUTPATIENT)
Dept: NURSING | Facility: CLINIC | Age: 66
End: 2021-03-05
Payer: COMMERCIAL

## 2021-03-05 PROCEDURE — 91303 PR COVID VAC JANSSEN AD26 0.5ML: CPT

## 2021-03-05 PROCEDURE — 0031A PR COVID VAC JANSSEN AD26 0.5ML: CPT

## 2021-03-30 ASSESSMENT — ENCOUNTER SYMPTOMS
MUSCLE WEAKNESS: 0
SORE THROAT: 0
BACK PAIN: 1
TROUBLE SWALLOWING: 0
MEMORY LOSS: 0
TASTE DISTURBANCE: 0
NECK MASS: 0
SPEECH CHANGE: 0
NUMBNESS: 0
MYALGIAS: 1
JOINT SWELLING: 1
DEPRESSION: 0
PANIC: 0
SEIZURES: 0
WEAKNESS: 0
SMELL DISTURBANCE: 0
TREMORS: 1
LOSS OF CONSCIOUSNESS: 0
DECREASED CONCENTRATION: 0
INSOMNIA: 0
HOARSE VOICE: 0
PARALYSIS: 0
DIZZINESS: 0
TINGLING: 0
ARTHRALGIAS: 1
SINUS PAIN: 0
STIFFNESS: 0
NECK PAIN: 1
MUSCLE CRAMPS: 0
SINUS CONGESTION: 0
NERVOUS/ANXIOUS: 1
DISTURBANCES IN COORDINATION: 0
HEADACHES: 1

## 2021-04-02 ENCOUNTER — OFFICE VISIT (OUTPATIENT)
Dept: NEUROLOGY | Facility: CLINIC | Age: 66
End: 2021-04-02
Payer: COMMERCIAL

## 2021-04-02 VITALS
SYSTOLIC BLOOD PRESSURE: 140 MMHG | OXYGEN SATURATION: 100 % | DIASTOLIC BLOOD PRESSURE: 70 MMHG | HEART RATE: 69 BPM | RESPIRATION RATE: 16 BRPM

## 2021-04-02 DIAGNOSIS — G25.0 ESSENTIAL TREMOR: Primary | ICD-10-CM

## 2021-04-02 DIAGNOSIS — R45.89 ANXIETY ABOUT HEALTH: ICD-10-CM

## 2021-04-02 PROCEDURE — 99417 PROLNG OP E/M EACH 15 MIN: CPT | Performed by: PSYCHIATRY & NEUROLOGY

## 2021-04-02 PROCEDURE — 99215 OFFICE O/P EST HI 40 MIN: CPT | Performed by: PSYCHIATRY & NEUROLOGY

## 2021-04-02 RX ORDER — OLANZAPINE 2.5 MG/1
1.25 TABLET, FILM COATED ORAL
COMMUNITY
Start: 2021-03-11

## 2021-04-02 RX ORDER — LORAZEPAM 0.5 MG/1
TABLET ORAL
Qty: 60 TABLET | Refills: 3 | Status: SHIPPED | OUTPATIENT
Start: 2021-04-02

## 2021-04-02 ASSESSMENT — PAIN SCALES - GENERAL: PAINLEVEL: NO PAIN (0)

## 2021-04-02 NOTE — PROGRESS NOTES
"Department of Neurology  Movement Disorders Division     Patient: Aury Marinelli   MRN: [id number]   : 1955   Date of Visit: 21    Reason for visit: follow up for management of ET    History of Present Illness  Ms. Marinelli is a 65 year old female that presents to Neurology Movement clinic for follow up regarding management of ET.  Patient was last seen on 21 where she was started on liquid gabapentin in hopes that a smaller dose would be able to manage/control her internal > external tremors.      History obtained from patient. Patient reports she \"tried liquid Gabapentin and it gave me bad nausea and dizziness, I cannot take this med even at this low dose\", thus she stopped the medication.  Patient reports she is feeling better and is not nauseated.  She believes the gabapentin is the cause of her nausea.  She has restarted olanzapine on a small dose last , prescribed by Dr. Hinton.  She continues to take a small dose of Ativan which improves her external and internal shaking.  She takes Ativan 0.5 mg in the morning which usually lasts until 3-4PM then may take another 0.5 mg dose in the afternoon then may take half a tab as needed later in the day.  She is also taking propranolol and plans to increase this dose to 5 mg every day.  She is currently on 2.5 mg propranolol daily.  She has not been seen by nutrition yet.  Her blood work for anemia was reported to be okay.    Review of Systems:  Other than that mentioned above, the remainder of 12 systems reviewed were negative.    PMH: unchanged  PSH: unchanged  FH: unchanged  SH: unchanged    Medications:  Current Outpatient Medications   Medication Sig Dispense Refill     acetaminophen (TYLENOL) 325 MG tablet Take 1,000 mg by mouth       aspirin (ASA) 81 MG EC tablet Take 81 mg by mouth       cloNIDine (CATAPRES) 0.1 MG tablet TK 1 T PO TID IF NEEDED FOR BLOOD PRESSURE GREATER THAN 150/90       clopidogrel (PLAVIX) 75 MG tablet Take 75 mg by " mouth       gabapentin (NEURONTIN) 250 MG/5ML solution Start with 50 mg qam, increase 50 mg weekly to 200 mg qam. Stop at dose that improves symptoms. 360 mL 4     LORazepam (ATIVAN) 0.5 MG tablet TK 1 T PO QD       meclizine (ANTIVERT) 12.5 MG tablet Take 12.5 mg by mouth 3 times daily as needed       Multiple Vitamins-Minerals (MULTIVITAMIN ADULT PO)        nitroGLYcerin (NITROSTAT) 0.4 MG sublingual tablet PLACE 1 T UNDER THE TONGUE EVERY 5 MINUTES IF NEED FOR CHEST PAIN       propranolol (INDERAL) 10 MG tablet 1/4 tab oral twice a day       rosuvastatin (CRESTOR) 10 MG tablet TK 1 T PO HS       vitamin B-12 (CYANOCOBALAMIN) 500 MCG tablet Take 500 mcg by mouth daily           Allergies   Allergen Reactions     Felodipine Shortness Of Breath     Lisinopril Shortness Of Breath     SOB, upset stomach     Metoclopramide Palpitations     Bisoprolol      Other reaction(s): *Unknown  Fatigue     Carvedilol      Other reaction(s): *Unknown  Fatigue     Codeine Nausea     Other reaction(s): GI Upset     Ferrous Sulfate Diarrhea     Hydrocodone-Acetaminophen Nausea     Iron Diarrhea     Other reaction(s): GI Upset     Isosorbide Other (See Comments)     Fatigue and high blood pressure     Metoprolol Other (See Comments)     Flu like symptoms     Morphine Nausea     Primidone Nausea     shakiness  shakiness       Succinylcholine Other (See Comments)     States made her feel terrible  States made her feel terrible       Tramadol Other (See Comments)     Reaction(s) after stopping tramadol: Shakes/shivers/headache/other symptoms she thought were cardiac symptoms that she found were not.          Physical Exam:  The patient's  vitals were not taken for this visit.    GENERAL: alert, active, attentive, appropriately groomed   HEENT: normocephalic, eyes open with no discharge, nares patent, oropharynx clear-no lesions  CHEST: normal configuration, chest rise equal b/l, non labored breathing   EXTREMITIES: no edema/cyanosis in  BUE/BLE  PSYCH: mood anxious       Neurologic Exam:  MENTAL STATUS: Alert and oriented to person, place, time, and situation. Follows commands. Recent and remote memory intact. Attention span and concentration intact. Fund of knowledge intact to current events.   SPEECH: Fluent, intact comprehension and articulation  CN: visual fields intact in all fields, EOMIB, facial movement symmetric, hearing grossly intact to conversation, tongue protrudes midline   MOTOR: Moves all extremities equally against gravity without difficulty   Involuntary movements:   Minimal to no tremor observed in hands outstretched or in wingbeat  No-no Head tremor less than 1 cm   No vocal tremor  Agility: Normal finger tapping and toe tapping b/l  Tone: Normal axial and appendicular tone  COORDINATION: no dysmetria with FTN, HTS  GAIT: able to rise unassisted from a seated position, normal arm swing and normal stride length, no en bloc turns, no ataxia      Impression:  Ms. Marinelli is a 65 year old female that presents to Neurology Movement clinic for follow up regarding management of ET. Today her main complaint continues to be tremors, internal > external. Tremors are exacerbated by nausea and anxiety. She was unable to tolerate low dose liquid gabapentin due to nausea thus she stopped this med. Her nausea is currently under control.We discussed that we have exhausted our options for therapeutic medications of ET. It is best to control her anxiety and nausea and try to increase the propranolol, as tolerated.  She will try to increase her dose of propranolol to 5 mg with the help of her cardiologist.     1. Essential Tremor involving head > hands > legs and, to a lesser, extent voice. Tremors occur with action, no tremors at rest. She denies tremors at rest. She was trialed on low dose primidone due to past intolerance of propranolol but could not tolerate this med (hallucinations and severe nausea to taking 25 mg primidone x 1) thus was  "stopped. She has tried propranolol for tremor control but could not tolerate SEs with up titration of dose (breathlessness). Hypothyroidism could be contributing. Trialed on baclofen 10 mg with a slow titration to 10mg BID but developed \"bad headaches and dizziness\" on 5 mg qday. She stated that she did notice an improvement in tremors (external and internal) with gabapentin thus she was restarted on this med after stopping it due to \"bad nausea and stomach cramps\". She continues to take Ativan as needed which helps her calm down for at least 2 to 3 hours and improves the tremors. I have discussed onabotulinumtoxin A injections for head tremor but she is not interested as her head tremor is not bothersome and this treatment would not address the internal tremors. Patient offered [facility] pharmacy referral due to intolerance/sensitivity to many medications in setting of a complicated medical history but patient is not interested at this time because of cost.  2. Anxiety: developed due to poorly controlled tremors and cardiac history   3. Nausea- Unclear etiology. Following with Endo and GI for workup and management of this symptom.   4. Autonomic symptoms associated with Bowel Movements - Unclear etiology. When shaking occurs, she reports experiencing persistent nausea and autonomic effects such as sweating, diaphoresis and palpitations. She notices the autonomic symptoms are associated with bowel movements.   5. Extensive cardiac history s/p multiple cardiac procedures - follows with Cardiology  6. Hypothyroidism  7. History of Anemia     Plan:   - Continue Ativan as needed.   - Agree with increasing propranolol dose, as tolerated. Defer to cardiologist for this change.     Patient to return in 3 months, for in-person visit, 60 minutes.     Elana Sevilla DO, MA   of Neurology   [facility]    84 minutes spent on date of encounter doing chart reviews and exam and documentation and " further activities as noted above.                  Answers for HPI/ROS submitted by the patient on 3/30/2021   General Symptoms: No  Skin Symptoms: No  HENT Symptoms: Yes  EYE SYMPTOMS: No  HEART SYMPTOMS: No  LUNG SYMPTOMS: No  INTESTINAL SYMPTOMS: No  URINARY SYMPTOMS: No  GYNECOLOGIC SYMPTOMS: No  BREAST SYMPTOMS: No  SKELETAL SYMPTOMS: Yes  BLOOD SYMPTOMS: No  NERVOUS SYSTEM SYMPTOMS: Yes  MENTAL HEALTH SYMPTOMS: Yes  Ear pain: No  Ear discharge: No  Hearing loss: No  Tinnitus: No  Nosebleeds: No  Congestion: No  Sinus pain: No  Trouble swallowing: No   Voice hoarseness: No  Mouth sores: No  Sore throat: No  Tooth pain: Yes  Gum tenderness: Yes  Bleeding gums: Yes  Change in taste: No  Change in sense of smell: No  Dry mouth: No  Hearing aid used: No  Neck lump: No  Back pain: Yes  Muscle aches: Yes  Neck pain: Yes  Swollen joints: Yes  Joint pain: Yes  Bone pain: No  Muscle cramps: No  Muscle weakness: No  Joint stiffness: No  Bone fracture: No  Trouble with coordination: No  Dizziness or trouble with balance: No  Fainting or black-out spells: No  Memory loss: No  Headache: Yes  Seizures: No  Speech problems: No  Tingling: No  Tremor: Yes  Weakness: No  Difficulty walking: No  Paralysis: No  Numbness: No  Nervous or Anxious: Yes  Depression: No  Trouble sleeping: No  Trouble thinking or concentrating: No  Mood changes: No  Panic attacks: No

## 2021-04-02 NOTE — LETTER
"2021       RE: Aury Marinelli  [address] [place] [address]     Dear Colleague,    Thank you for referring your patient, Aury Marinelli, to the [facility]UR[facility] [place] at [facility]. Please see a copy of my visit note below.    Department of Neurology  Movement Disorders Division     Patient: Aury Marinelli   MRN: [id number]   : 1955   Date of Visit: 21    Reason for visit: follow up for management of ET    History of Present Illness  Ms. Marinelli is a 65 year old female that presents to Neurology Movement clinic for follow up regarding management of ET.  Patient was last seen on 21 where she was started on liquid gabapentin in hopes that a smaller dose would be able to manage/control her internal > external tremors.      History obtained from patient. Patient reports she \"tried liquid Gabapentin and it gave me bad nausea and dizziness, I cannot take this med even at this low dose\", thus she stopped the medication.  Patient reports she is feeling better and is not nauseated.  She believes the gabapentin is the cause of her nausea.  She has restarted olanzapine on a small dose last , prescribed by Dr. Hinton.  She continues to take a small dose of Ativan which improves her external and internal shaking.  She takes Ativan 0.5 mg in the morning which usually lasts until 3-4PM then may take another 0.5 mg dose in the afternoon then may take half a tab as needed later in the day.  She is also taking propranolol and plans to increase this dose to 5 mg every day.  She is currently on 2.5 mg propranolol daily.  She has not been seen by nutrition yet.  Her blood work for anemia was reported to be okay.    Review of Systems:  Other than that mentioned above, the remainder of 12 systems reviewed were negative.    PMH: unchanged  PSH: unchanged  FH: unchanged  SH: unchanged    Medications:  Current Outpatient " Medications   Medication Sig Dispense Refill     acetaminophen (TYLENOL) 325 MG tablet Take 1,000 mg by mouth       aspirin (ASA) 81 MG EC tablet Take 81 mg by mouth       cloNIDine (CATAPRES) 0.1 MG tablet TK 1 T PO TID IF NEEDED FOR BLOOD PRESSURE GREATER THAN 150/90       clopidogrel (PLAVIX) 75 MG tablet Take 75 mg by mouth       gabapentin (NEURONTIN) 250 MG/5ML solution Start with 50 mg qam, increase 50 mg weekly to 200 mg qam. Stop at dose that improves symptoms. 360 mL 4     LORazepam (ATIVAN) 0.5 MG tablet TK 1 T PO QD       meclizine (ANTIVERT) 12.5 MG tablet Take 12.5 mg by mouth 3 times daily as needed       Multiple Vitamins-Minerals (MULTIVITAMIN ADULT PO)        nitroGLYcerin (NITROSTAT) 0.4 MG sublingual tablet PLACE 1 T UNDER THE TONGUE EVERY 5 MINUTES IF NEED FOR CHEST PAIN       propranolol (INDERAL) 10 MG tablet 1/4 tab oral twice a day       rosuvastatin (CRESTOR) 10 MG tablet TK 1 T PO HS       vitamin B-12 (CYANOCOBALAMIN) 500 MCG tablet Take 500 mcg by mouth daily           Allergies   Allergen Reactions     Felodipine Shortness Of Breath     Lisinopril Shortness Of Breath     SOB, upset stomach     Metoclopramide Palpitations     Bisoprolol      Other reaction(s): *Unknown  Fatigue     Carvedilol      Other reaction(s): *Unknown  Fatigue     Codeine Nausea     Other reaction(s): GI Upset     Ferrous Sulfate Diarrhea     Hydrocodone-Acetaminophen Nausea     Iron Diarrhea     Other reaction(s): GI Upset     Isosorbide Other (See Comments)     Fatigue and high blood pressure     Metoprolol Other (See Comments)     Flu like symptoms     Morphine Nausea     Primidone Nausea     shakiness  shakiness       Succinylcholine Other (See Comments)     States made her feel terrible  States made her feel terrible       Tramadol Other (See Comments)     Reaction(s) after stopping tramadol: Shakes/shivers/headache/other symptoms she thought were cardiac symptoms that she found were not.          Physical  Exam:  The patient's  vitals were not taken for this visit.    GENERAL: alert, active, attentive, appropriately groomed   HEENT: normocephalic, eyes open with no discharge, nares patent, oropharynx clear-no lesions  CHEST: normal configuration, chest rise equal b/l, non labored breathing   EXTREMITIES: no edema/cyanosis in BUE/BLE  PSYCH: mood anxious       Neurologic Exam:  MENTAL STATUS: Alert and oriented to person, place, time, and situation. Follows commands. Recent and remote memory intact. Attention span and concentration intact. Fund of knowledge intact to current events.   SPEECH: Fluent, intact comprehension and articulation  CN: visual fields intact in all fields, EOMIB, facial movement symmetric, hearing grossly intact to conversation, tongue protrudes midline   MOTOR: Moves all extremities equally against gravity without difficulty   Involuntary movements:   Minimal to no tremor observed in hands outstretched or in wingbeat  No-no Head tremor less than 1 cm   No vocal tremor  Agility: Normal finger tapping and toe tapping b/l  Tone: Normal axial and appendicular tone  COORDINATION: no dysmetria with FTN, HTS  GAIT: able to rise unassisted from a seated position, normal arm swing and normal stride length, no en bloc turns, no ataxia      Impression:  Ms. Marinelli is a 65 year old female that presents to Neurology Movement clinic for follow up regarding management of ET. Today her main complaint continues to be tremors, internal > external. Tremors are exacerbated by nausea and anxiety. She was unable to tolerate low dose liquid gabapentin due to nausea thus she stopped this med. Her nausea is currently under control.We discussed that we have exhausted our options for therapeutic medications of ET. It is best to control her anxiety and nausea and try to increase the propranolol, as tolerated.  She will try to increase her dose of propranolol to 5 mg with the help of her cardiologist.     1. Essential  "Tremor involving head > hands > legs and, to a lesser, extent voice. She was trialed on low dose primidone due to past intolerance of propranolol but could not tolerate this med (hallucinations and severe nausea to taking 25 mg primidone x 1) thus was stopped. She has tried propranolol for tremor control but could not tolerate SEs with up titration of dose (breathlessness). Hypothyroidism could be contributing. Trialed on baclofen 10 mg with a slow titration to 10mg BID but developed \"bad headaches and dizziness\" on 5 mg qday. She stated that she did notice an improvement in tremors (external and internal) with gabapentin thus she was restarted on this med after stopping it due to \"bad nausea and stomach cramps\". She continues to take Ativan as needed which helps her calm down for at least 2 to 3 hours and improves the tremors. I have discussed onabotulinumtoxin A injections for head tremor but she is not interested as her head tremor is not bothersome and this treatment would not address the internal tremors. Patient offered [facility] pharmacy referral due to intolerance/sensitivity to many medications in setting of a complicated medical history but patient is not interested at this time because of cost.  2. Anxiety: developed due to poorly controlled tremors and cardiac history   3. Nausea- Unclear etiology. Following with Endo and GI for workup and management of this symptom.   3. Autonomic symptoms associated with BM - Unclear etiology. Following with Endo.  4. Extensive cardiac history s/p multiple cardiac procedures - follows with Cardiology  5. Hypothyroidism  6. History of Anemia     Plan:   - Continue Ativan as needed.   - Agree with increasing propranolol dose, as tolerated. Defer to cardiologist for this change.     Patient to return in 3 months, for in-person visit, 60 minutes.     Elana Sevilla DO, MA   of Neurology   [facility]    84 minutes spent on date of encounter " doing chart reviews and exam and documentation and further activities as noted above.

## 2021-05-25 ENCOUNTER — RECORDS - HEALTHEAST (OUTPATIENT)
Dept: ADMINISTRATIVE | Facility: CLINIC | Age: 66
End: 2021-05-25

## 2021-05-26 ENCOUNTER — RECORDS - HEALTHEAST (OUTPATIENT)
Dept: ADMINISTRATIVE | Facility: CLINIC | Age: 66
End: 2021-05-26

## 2021-05-27 NOTE — TELEPHONE ENCOUNTER
Called [facility]  Instructed that member needs to call Medical Plan to appeal.  [facility].  ID: [id number]  ==========  Called [facility].  [place]    Appeals  [phone]  =============

## 2021-05-27 NOTE — TELEPHONE ENCOUNTER
Patient reports she has 2 prescriptions of Lipitor.    Patient states her PCP has submitted another prescription and she has 2 full bottles of Lipitor because she was cutting the tablets in half.    Patient has no concerns about obtaining Lipitor in the future.

## 2021-05-27 NOTE — PROGRESS NOTES
Cardiac Rehab  Phase II Assessment    Assessment Date: 4/10/2019      Diagnosis: NSTEMI, PCI with Stents   Date of Onset: 4/1-2/19  Procedure: PCI with stents +staged procedure Date of Onset: 4/1-2/19  ICD/Pacemaker: No Parameters: N/A  Post-op Complications: None  ECG History: SR, 1'AVB EF%:68  Past Medical History:   Past Medical History:   Diagnosis Date     Breast injury     hit cabinet on left axillary side     Coronary artery disease      Fibrocystic breast      GERD (gastroesophageal reflux disease)      Hyperlipidemia      Hypertension      IBS (irritable bowel syndrome)      Patient Active Problem List   Diagnosis     Premature Menopause     Eczema     Osteoarthritis     Hypercholesteremia     Anemia     Irritable Bowel Syndrome     Female Infertility     Chest pain     NSTEMI (non-ST elevated myocardial infarction) (H)     ACS (acute coronary syndrome) (H)     Essential hypertension     Coronary artery disease due to lipid rich plaque     Past Surgical History:   Procedure Laterality Date     CV CORONARY ANGIOGRAM N/A 4/1/2019    Procedure: Coronary Angiogram;  Surgeon: Nilo Voss MD;  Location: [facility];  Service: Cardiology     CV CORONARY ANGIOGRAM N/A 4/2/2019    Procedure: Coronary Angiogram;  Surgeon: Rick Chilel MD;  Location: [facility] Cath Lab;  Service: Cardiology     VA LAP,UTERUS,UNLISTED PROCEDURE      Description: Laparoscopy Of Uterus;  Recorded: 01/13/2009;     Social History     Socioeconomic History     Marital status:      Spouse name: Not on file     Number of children: Not on file     Years of education: Not on file     Highest education level: Not on file   Occupational History     Not on file   Social Needs     Financial resource strain: Not on file     Food insecurity:     Worry: Not on file     Inability: Not on file     Transportation needs:     Medical: Not on file     Non-medical: Not on file   Tobacco Use     Smoking status: Never Smoker      Smokeless tobacco: Never Used   Substance and Sexual Activity     Alcohol use: No     Frequency: Never     Drug use: No     Sexual activity: Not on file   Lifestyle     Physical activity:     Days per week: Not on file     Minutes per session: Not on file     Stress: Not on file   Relationships     Social connections:     Talks on phone: Not on file     Gets together: Not on file     Attends [employer]ism service: Not on file     Active member of club or organization: Not on file     Attends meetings of clubs or organizations: Not on file     Relationship status: Not on file     Intimate partner violence:     Fear of current or ex partner: Not on file     Emotionally abused: Not on file     Physically abused: Not on file     Forced sexual activity: Not on file   Other Topics Concern     Not on file   Social History Narrative     Not on file     Family History   Problem Relation Age of Onset     Breast cancer Maternal Grandmother        Physical Assessment  Precautions/ Physical Limitations: CAD, T3 compressed vertebra  Oxygen: No  O2 Sats: 97 Lung Sounds: clear Edema: none  Incisions: healing well, bruseing   Sleeping Pattern: fair   Appetite: good   Nutrition Risk Screen: pt would like to maintain weight at this time. Pt is not interested in meeting with dietician because she has been following a low fat and low salt diet for years.    Pain  Location: T3  Characteristics:ache  Intensity: (0-10 scale) 2  Current Pain Management: Tylenol  Intervention: Tylenol, caution on arm use   Response: helps take edge off pain    Psychosocial/ Emotional Health  1. In the past 12 months, have you been in a relationship where you have been abused physically, emotionally, sexually or financially? No  notified: NA  2. Who do you turn to for emotional support?: , family and friends  3. Do you have cultural or spiritual needs? No  4. Have there been any major life changes in the past 12 months? Yes  MI/Stents    Referral Information  Primary Physician: Taisha Hinton MD? Anabella Camejo  Cardiologist: Ovidio  Surgeon: N/A    Home exercise/Equipment: none    Patient's long-term goal(s): resume all activities: walking, swimming, traveling to [place], [place] months in [facility]    1. Living Accommodations: Home Steps: Yes      Support people at home:    2. Marital Status:   3. Family is able to assist with cares      Adventism/Community involvement: group of friends get together's for coffee/tea  4. Recreation/Hobbies: traveling, watch TV, enjoy alf

## 2021-05-27 NOTE — TELEPHONE ENCOUNTER
Spoke to patient. She will need CTA head/neck due to level of stenosis shown in US carotid. Pt wants to call and check with her insurance first before ordering and scheduling anything. She will call back.

## 2021-05-27 NOTE — TELEPHONE ENCOUNTER
Patient called with questions about apt for carotid stenosis. Has had US done. Wants to be seen in [place] and see Dr Santacruz. Advised to get referral. We may call to get a CTA/MRA of head and neck scheduled. Patient has talked to insurance but has further questions about how we bill. Gave cust advocacy number. I will also send this to Giovanni Garcia RN. Patient has had recent heart surgery.

## 2021-05-27 NOTE — TELEPHONE ENCOUNTER
Fax received EPA process PA for lipitor.    PA initiated through Covermymeds:  FRITZ PATEL Luz: [id number] - PA Case ID: [id number]

## 2021-05-27 NOTE — PATIENT INSTRUCTIONS - HE
Aury Marinelli,    It was a pleasure to see you today at the [facility].     My recommendations after this visit include:  - No changes to your medications.   I will send refills to your mail order pharmacy.      Lilia López CNP      Medication     o Take all your medications as prescribed  o Do not stop any medications without talking with a healthcare provider    Exercise      o Physical activity is important for overall health  o Set a goal of 150 minutes of exercise each week  o For example, 30 minutes of exercise 5 days each week.    o These 30 minutes can be broken into shorter periods of 15 minutes twice daily or 10 minutes three times daily  o Start any exercise program slowly and work towards the goal of 150 minutes each week  o For example, you may start with 10 minutes and plan to add a few minutes each week as you get stronger   o Examples of exercise include walking, swimming, or biking  o Remember to stretch and stay hydrated with exercise    Diet     o A heart healthy diet includes:  o A variety of fruits and vegetables  o Whole grains  o Low-fat dairy (fat-free, 1% fat, and low-fat)  o Lean meats and poultry without skin   o Fish (eat fish 2 times each week)  o Nuts  o Limit saturated fat to about 13 grams each day (based on a 2000 calorie diet)  o Limit red meat  o Limit sugars (sweets and sugary beverages)  o Limit your portion sizes  o Do not add salt to your food when cooking or at the table  o Limit alcohol intake (no more than 1 drink each day for women or 2 drinks each day for men)    Weight Loss     o Work on losing weight with diet and exercise  o You BMI (body mass index) should be between 18.5-24.9  o This is a calculation of your weight and height  o Please ask your healthcare provider for your BMI    Manage Other Chronic Health Conditions     o Control cholesterol  o Eat a diet low in saturated fat  o Exercise   o Take a statin medication as prescribed  o Manage  blood pressure  o Eat a diet low in sodium  o Exercise  o Reduce stress  o Lose weight   o Take blood pressure medications as prescribed  o Control blood sugars if diabetic  o Monitor sugars and carbohydrates in your diet  o Lose weight   o Take diabetes medications as prescribed  o Follow-up with your primary care provider to make sure your blood sugars are well controlled    Stress Reduction     o Find time each day to relax  o Reading, listening to music, yoga, meditation, exercise, spending time with friends and family, volunteering   o Get 6-8 hours of sleep each night    Smoking Cessation     o Smoking causes numerous health problems including coronary artery disease  o It is never too late to quit  o Set realistic goals for quitting  o Decrease the number of cigarettes used each week  o Use nicotine gum or patches to help you quit    Information from the American Heart Association.  Please visit their website at www.heart.org

## 2021-05-27 NOTE — TELEPHONE ENCOUNTER
Patient called the clinic saying that she was told by her cardiologist that she needs to see a vascular surgeon but doesn't think her insurance will pay for a carotid CT. The ultrasound of the neck was done when she was hospitalized at [facility] for her heart.   She would like to know how likely it is that a vascular surgeon would be able to do something for her and if she really needs the CT. She is on blood thinners and has a drug eluding stent.  She would like a call back at [phone]

## 2021-05-27 NOTE — TELEPHONE ENCOUNTER
Fax received this medicaion is a plan exclusion and is not a covered benefit.    Call [phone]

## 2021-05-27 NOTE — PROGRESS NOTES
"ITP ASSESSMENT   Assessment Day: Initial    Session Number: 1  Precautions: CAD, T3 compressed vertebra    Diagnosis: MI;Stent    Risk Stratification: High    Referring Provider: Rcik Chilel,*  ITP: Dr. Eaton  EXERCISE  Exercise Assessment: Initial       6 Minute Walk Test   Pre   Pre Exercise HR: 63                    Pre Exercise BP: 108/62      Peak  Peak HR: 80                   Peak BP: 122/66    Peak feet: 1125    Peak O2 SAT: 97    Peak RPE: 11    Peak MPH: 2.13      Symptoms:  Peak Symptoms: \"hard to take a deep breath\" Denies SOB/Winded feeling      5 mins. Post  5 Min Post HR: 65    5 Min Post BP: 92/64                           Exercise Plan  Goals Next 30 days  ADL'S: Resume showering/bathing/dressing with less SOB and fatigue.    Leisure: Resume light house cleaning: dishes, sweeping/mopping.     Work: Retired    Education Goals: Patient can state cardiac s/s and appropriate emergency response.    Education Goals Met: Medication review.;Has system for taking medication.      Exercise Prescription  Exercise Mode: Treadmill;Bike;Nustep;Arm Erg.;Hallway Walking;Stairs    Frequency: 2x/week    Duration: 30-50min    Intensity / THR: 20-30 beats above resting heart rate    RPE 11-14  Progression / Met level: 2.6-3.8+    Resistive Training?: (S) No (Pending T3 compressed vertebra)      Current Exercise (mins/week): 56      Interventions  Home Exercise:  Mode: Walking    Frequency: 2-3x/week on non rehab days     Duration: 15' x2/day      Education Material : Educational videos;Provide written material;Individual education and counseling;Offer educational classes      Education Completed  Exercise Education Completed: Cardiac Anatomy;Signs and Symptoms;Medication review;RPE;Emergency Plan;Home Exercise;Warm up/cool down;FITT Principles;BP/HR Reponse to exercise;Stretching;Strength training;Benefits of Exercise;End point of exercise              Exercise Follow-up/Discharge  Follow up/Discharge: Pt " "is currently exercising daily walking around home 4'x2 daily. Encourage increased durations as tolerated overall goal is to get closer to 30'x1/daily on non rehab days   NUTRITION  Nutrition Assessment: Initial      Nutrition Risk Factors:  Nutrition Risk Factors: Dyslipidemia  Cholesterol: 3/31/19: 219  LDL: 166  HDL: 34  Triglycerides: 94      Nutrition Plan  Interventions  Diet Consult: Declined    Other Nutrition Intervention: Diet Class;Therapist/Pt Discussion;Provide with Written Material    Initial Rate Your Plate Score: 0 (Diet Survey given-encouraged pt to complete)      Education Completed  Nutrition Education Completed: Low Saturated fat diet;Risk factor overview;Low sodium diet      Goals  Nutrition Goals (Next 30 days): Patient knows appropriate portion size      Goals Met  Nutrition Goals Met: Patient can identify their risk factors for CAD;Patient follows a low sodium diet;Patient states following a low saturated fat diet;Reviewed Dietitian schedule;Provided Rate your Plate Survey;Completed Nutritional Risk Screen      Height, Weight, and  BMI  Weight: 134 lb 3.2 oz (60.9 kg)  Height: 5' 1\" (1.549 m)  BMI: 25.37      Nutrition Follow-up  Follow-up/Discharge: Pt would like to maintain weight. She states she has been following low fat and low salt for years-she will continue to follow heart healthy diet. Pt has nicely declined 1:1 at this time. She knows it is available if needed.          Other Risk Factors  Other Risk Factor Assessment: Initial      HTN Risk Factor: Hypertension      Pre Exercise BP: 108/62  Post Exercise BP: 92/64 (no cv s/s with post ex BP)      Hypertension Plan  Goals  HTN Goals: Patient demonstrates understanding of HTN, no goals identified for the next 30 days      Goals Met  HTN Goals Met: Follow low sodium diet;Take medication as prescribed;Exercises regularly      HTN Interventions  HTN Interventions: Diet consult;Therapist/patient discussion;Provide written material;Offer " educational classes      HTN Education Completed  HTN Education Completed: Low sodium diet;Medication review;Risk factor overview      Tobacco Risk Factor: NA      Risk Factor Follow-up   Follow-up/Discharge: CRF: Family Hx, HTN, CHOL     PSYCHOSOCIAL  Psychosocial Assessment: Initial       [facility] Q of L Summary Score: 18      MARLON-D Score: 16      Psychosocial Risk Factor: NA (Pt states that her stressors are mangable)      Psychosocial Plan  Interventions  If MARLON-D > 15 send letter to MD  Interventions: Offer educational videos and classes;Provide written material;Individual education and counseling      Education Completed  Education Completed: Relaxation/Coping Techniques;S/S of depression;Effects of stress on body      Goals  Goals (Next 30 days): Patient demonstrates understanding of stress, no goals identified for the next 30 days      Goals Met  Goals Met: Identified Support system;Oriented to stress management classes;Identify stressors;Practicing stress management skills      Psychosocial Follow-up  Follow-up/Discharge: Pt states that her stressors are mangable at this time. She enjoys watching TV, going to lunch/coffee/tea with friends, walks regularly and enjoys traveling for relaxation. Pt has a great support system with family and friends           Patient involved in Goal setting?: Yes

## 2021-05-27 NOTE — PROGRESS NOTES
Aury Marinelli has participated in 4  sessions of Phase II Cardiac Rehab.    Progress Report:   Cardiac Rehab Treatment Progress Report 4/10/2019 4/12/2019 4/15/2019   Weight 134 lbs 3 oz 136 lbs 13 oz 137 lbs 6 oz   Pre Exercise  HR 63 63 63   Pre Exercise /62 106/60 110/70   Pre Blood Sugar (mg/dl) -   -   Treadmill Peak HR HR 80 83 81   Treadmill Peak Blood Pressure /66 138/64 122/60   Nustep Peak Heart Rate - 78 77   Heart Rate 65 65 65   Post Exercise BP 92/64 102/60 106/62   ECG SR/TWI Lead II, III. Lead I has a slight ST elevation < 0.5mm-no CV s/s with exertion.  SR/TWI SR/TWI   Total Exercise Minutes 6 35 40         Current Status:  Symptomatic Winded- O2 97-98% RA w/rest and ex. T3 pain and R leg achiness, denies chest pain and Therapists Comments: Pt progressing well and feeling more confident    If Physician recommends change in treatment plan, please place orders.        __________________________________________________      _____________  Signature                                                                                                  Date

## 2021-05-28 NOTE — TELEPHONE ENCOUNTER
Writer called patient and advised her that based on protocol with her US carotid result, she would need a CTA head/neck. Patient states she just had stents placed in recently and is on Brillinta and she needs to stay on the Brillinta for at least 1 year first. She is unsure if she can be off of it prior to surgery if needed. She stated that she will talk to her cardiologist when she follows up in May and decide from there about her consult with vascular.

## 2021-05-28 NOTE — PROGRESS NOTES
ITP ASSESSMENT   Assessment Day: 30 Day    Session Number: 9/10  Precautions: Standard cardiac sx, CAD, T3 compressed vertebrae    Diagnosis: MI;Stent    Risk Stratification: High    Referring Provider: Dr. Rick Chilel  ITP sent to Dr. Eaton  EXERCISE  Exercise Assessment: Reassessment                              Exercise Plan  Goals Next 30 days  ADL'S: Resume vacuuming.    Leisure: Start light swimming.    Work: Retired      Education Goals: Patient can state cardiac s/s and appropriate emergency response.    Education Goals Met: Medication review.;Has system for taking medication.                          Goals Met  Initial ADL's goals met: Goal met- pt has resumed showering/bathing with less fatigue.    Initial Leisure goals met: Goal met- has resumed light house cleaning.    Intial Work goals met: Retired      Exercise Prescription  Exercise Mode: Treadmill;Bike;Nustep;Arm Erg.;Hallway Walking;Stairs    Frequency: 2x/week    Duration: 30-45 mins    Intensity / THR: 20-30 beats above resting heart rate    RPE 11-14  Progression / Met level: 3-3.5    Resistive Training?: No (Pt may want to try resistance training at some point.)      Current Exercise (mins/week): 140      Interventions  Home Exercise:  Mode: Walking    Frequency: 2-3x/week, 1-2x/day    Duration: 15-30 mins      Education Material : Educational videos;Provide written material;Individual education and counseling;Offer educational classes      Education Completed  Exercise Education Completed: Cardiac Anatomy;Signs and Symptoms;Medication review;RPE;Emergency Plan;Home Exercise;Warm up/cool down;FITT Principles;BP/HR Reponse to exercise;Stretching;Strength training;Benefits of Exercise;End point of exercise              Exercise Follow-up/Discharge  Follow up/Discharge: Pt has reached peak of 3 METs on TM so far in rehab.  She reports walking regularly at home.           NUTRITION  Nutrition Assessment: Reassessment      Nutrition Risk  "Factors:  Nutrition Risk Factors: Dyslipidemia  Cholesterol: 3/31/19: 219  LDL: 166  HDL: 34  Triglycerides: 94      Nutrition Plan  Interventions  Diet Consult: Declined    Other Nutrition Intervention: Diet Class;Therapist/Pt Discussion;Educational Videos;Provide with Written Material    Initial Rate Your Plate Score: 69      Education Completed  Nutrition Education Completed: Low Saturated fat diet;Risk factor overview;Low sodium diet      Goals  Nutrition Goals (Next 30 days): Patient knows appropriate portion size      Goals Met  Nutrition Goals Met: Patient can identify their risk factors for CAD;Patient follows a low sodium diet;Patient states following a low saturated fat diet;Reviewed Dietitian schedule;Provided Rate your Plate Survey;Completed Nutritional Risk Screen      Height, Weight, and  BMI  Weight: 137 lb (62.1 kg)  Height: 5' 1\" (1.549 m)  BMI: 25.9      Nutrition Follow-up  Follow-up/Discharge: Pt declines diet consult.       Other Risk Factors  Other Risk Factor Assessment: Reassessment      HTN Risk Factor: Hypertension      Pre Exercise BP: 106/58  Post Exercise BP: 100/52      Hypertension Plan  Goals  HTN Goals: Patient demonstrates understanding of HTN, no goals identified for the next 30 days      Goals Met  HTN Goals Met: Follow low sodium diet;Take medication as prescribed;Exercises regularly      HTN Interventions  HTN Interventions: Diet consult;Therapist/patient discussion;Provide written material;Offer educational classes      HTN Education Completed  HTN Education Completed: Low sodium diet;Medication review;Risk factor overview      Tobacco Risk Factor: NA        Risk Factor Follow-up   Follow-up/Discharge: Pt following low sodium diet.         PSYCHOSOCIAL  Psychosocial Assessment: Reassessment       [facility] Q of L Summary Score: 18      MARLON-D Score: 16      Psychosocial Risk Factor: NA      Psychosocial Plan  Interventions  Interventions: Offer educational videos and " classes;Provide written material;Individual education and counseling      Education Completed  Education Completed: Relaxation/Coping Techniques;S/S of depression;Effects of stress on body      Goals  Goals (Next 30 days): Patient demonstrates understanding of stress, no goals identified for the next 30 days      Goals Met  Goals Met: Identified Support system;Oriented to stress management classes;Identify stressors;Practicing stress management skills      Psychosocial Follow-up  Follow-up/Discharge: Pt reports having a low stress level.  Using exercise to help manage any stress she does get.           Patient involved in Goal setting?: No      Signature: _____________________________________________________________    Date: __________________    Time: __________________

## 2021-05-28 NOTE — TELEPHONE ENCOUNTER
----- Message from Haleigh Esposito sent at 5/15/2019  9:51 AM CDT -----  Contact: Patient  Caller: Aury    Primary cardiologist: Dr. Nolan     Detailed reason for call: Aury states she is s/p stent(s) but now has a lump forming in the shin area and it is warm to the touch. She is concerned it's a clot and she had stopped taking Metoprolol Succinate as of Sunday, due to it making her nauseous. Please call back.     New or active symptoms? Yes    Best phone number: [phone]    Best time to contact: Today    Ok to leave a detailed message? Yes

## 2021-05-28 NOTE — TELEPHONE ENCOUNTER
Return call to patient who questioned if it is ok to take her Metoprolol Succ at lunch time and her Lisinopril in am due to c/o increased fatigue.    Explained to patient that Metoprolol Succ could be taken at bedtime, which is common due to c/o fatigue - stressed importance of taking daily meds at consistent times which are convenient to her lifestyle so doses are not missed - patient verbalized understanding and offered no further questions/concerns.  mg

## 2021-05-28 NOTE — TELEPHONE ENCOUNTER
Can you send this?        To whom it may concern:    Aury Marinelli is a patient of mine at [facility] at the [facility].  She will be unable to fly for the next 6 months.  Please call if any questions or concerns.    Sincerely,  Mary Nolan MD

## 2021-05-28 NOTE — PROGRESS NOTES
Aury Marinelli has participated in 9 sessions of Phase II Cardiac Rehab.    Progress Report:   Cardiac Rehab Treatment Progress Report 4/16/2019 4/22/2019 4/26/2019 5/3/2019 5/6/2019   Weight 135 lbs 137 lbs 3 oz 136 lbs 13 oz 137 lbs 3 oz 137 lbs   Pre Exercise  HR - 71 67 67 65   Pre Exercise BP - 118/62 112/60 106/64 106/58   Pre Blood Sugar (mg/dl) - - - - -   Treadmill Peak HR - 82 86 102 86   Treadmill Peak Blood Pressure - 124/60 128/70 130/70 118/54   Nustep Peak Heart Rate - - - - -   Heart Rate - 77 69 70 72   Post Exercise BP - 104/64 104/62 108/70 100/52   ECG - SR with rare PVC's SR with TWI SR with TWI SR with TWI   Total Exercise Minutes - 40 41 40 45         Current Status:  Symptomatic Pt c/o mild leg fatigue and SOB with ex. and Therapists Comments: Pt has progressed to 3 METs.  Long-term goal is 5 METs.  She reports walking regularly at home.    If Physician recommends change in treatment plan, please place orders.        __________________________________________________      _____________  Signature                                                                                                  Date

## 2021-05-28 NOTE — TELEPHONE ENCOUNTER
Patient reached today to discuss her concerns.     She underwent PCI Rt groin approach for nstemi on 3/31/19 and  Second PCI with stenting 4/1/19 using Lt radial approach.     She now reports Rt ankle swelling noted yesterday, just above the ankle area non-red, not painful to touch. This occurs with prolonged activity and resolves with elevation/overnight. Denies previous hx DVT, reports hx of osteoarthritis in other joints, also  reports no redness/pain/swelling with Rt groin site.     Advised patient that this description of swelling/pain above ankle appears normal given non-red, and non-swollen calf area. Should this increase or advance to include calf area to seek appointment/evaluation by PCP.     She also reports that she has discontinued the Metoprolol 25mg daily r/t nausea. Her recollection was that this was discussed during her last appointment with Dr. Nolan and it would be okay to stop this if nausea persisted.     Explained to patient that recent clinic note did not reflect this information/recommendations and would require clarification with Dr. Nolan. Informed her that Dr. Nolan would be contacted and she would only be contacted with additional recommendations.She verbalized understanding of this plan.           Dr. Nolan--Please review patient update and confirm your recommendations regarding stopping Metoprolol.               Thank you      sk

## 2021-05-28 NOTE — TELEPHONE ENCOUNTER
----- Message from Faheem Joseph sent at 5/17/2019 11:56 AM CDT -----  Contact: Patient  General phone call:    Caller: Patient   Primary cardiologist: Ovidio   Detailed reason for call: Patient is checking on status of  her letter request from Dr. Nolan. Patient states Dr. Nolan was supposed to mail a letter to her home address informing patients insurance company of the reason for her cancelled vacation due to her health condition. Patient has not received this letter yet and is asking for a call back before 2pm today.   New or active symptoms? No   Best phone number:    Best time to contact: Before 2pm today if possible.  Ok to leave a detailed message? Yes   Device? No     Additional Info:

## 2021-05-28 NOTE — TELEPHONE ENCOUNTER
Returned call to patient informed of Dr. Nolan's response/recommendations. She confirmed that her nausea has improved since stopping the Metoprolol on Sunday. She verbalized concerns regarding the possible impact of d/c this medication on her heart. Reassured and advised to monitor b/p at home and continue cardiac rehab until f/u in July. She verbalized understanding of this plan and will report any changes in symptoms  B/p levels , prior to scheduled appt.    sk

## 2021-05-28 NOTE — TELEPHONE ENCOUNTER
Called patient to inform her a letter is ready.    She request it be brought to cardiac rehab at  for her to get while she is there on Wednesday.  Letter brought to  cardiac rehab.

## 2021-05-28 NOTE — TELEPHONE ENCOUNTER
We had discussed possibly changing brilinta to plavix due to dyspnea. Agree with PMD evaluation if worsening swelling or erythemia or pain at the ankle.   If she feels that she is having side effects of the metoprolol she can hold it and see if her symptoms improved. If no improvement please resume metoprolol.

## 2021-05-28 NOTE — TELEPHONE ENCOUNTER
----- Message from Marilyn Dimas sent at 4/30/2019 12:44 PM CDT -----  Contact: Patient  General phone call:    Caller: Patient  Primary cardiologist: Dr. Nolan  Detailed reason for call: Pt has questions about taking meds- Metoprolol and Lisinopril  New or active symptoms?   Best phone number:   Best time to contact:   Ok to leave a detailed message? yes  Device? No    Additional Info:

## 2021-05-29 ENCOUNTER — RECORDS - HEALTHEAST (OUTPATIENT)
Dept: ADMINISTRATIVE | Facility: CLINIC | Age: 66
End: 2021-05-29

## 2021-05-29 NOTE — TELEPHONE ENCOUNTER
----- Message from Haleigh Esposito sent at 5/22/2019  9:39 AM CDT -----  Contact: Patient   Caller: Aury     Primary cardiologist: Dr. Nolan     Detailed reason for call: Aury states she has a CT Carotid on 5/24/19 and asks if her drug eluding stent(s) from 6 weeks ago would be affected in any way by this dye either long term or short term?     Also, she has a constant ache in her left breast area, is this where the stents are located and how concerned should she be about that?    In addition, she bought compression socks due to her puffy ankle and asks if she should wear the socks on both of her legs or just the puffy ankle/leg and for how long or if just sitting / standing or any other recommendation? Please return call.     New or active symptoms? See above    Best phone number: [phone] avail until 1:00pm but can also leave a message.     Best time to contact: Today    Ok to leave a detailed message? Yes    Device? No

## 2021-05-29 NOTE — TELEPHONE ENCOUNTER
"Return call to patient to address numerous questions - reassured her that contrast dye was used during stent placement and will not \"harm\" it during neck CT and that some patient's have reported \"odd sensations\" in chest after stents.    Informed her that she should report worsening of sx or new sx and encouraged patient to wear compression socks on both legs during day and remove overnight - patient verbalized understanding and questioned if she could split her Atorvastatin dose or change to lower dose of Crestor.    Explained to patient that total dose of Atorvastatin 80mg should be taken at same time and changing to stronger Rx of Crestor would have to be justified by intolerance to Atorvastatin and/or not reaching goal LDL - patient denied sx of myalgia with Atorvastatin just stated \"it makes her feel weird\".  mg  "

## 2021-05-29 NOTE — PROGRESS NOTES
"ITP ASSESSMENT   Assessment Day: 90 Day    Session Number: 23  Precautions: Standard cardiac sx, CAD, T3 compressed vertebrae    Diagnosis: MI;Stent    Risk Stratification: High    Referring Provider: Taisha Hinton MD   ITPs sent to Dr. Eaton  EXERCISE  Exercise Assessment: Reassessment                         Exercise Plan  Goals Next 30 days  ADL'S: Use weights more regularly and/or Arm Erg to increase UE strength for all goals.    Leisure: Increase METs to mock swimming (~5-6METs)     Work: Retired      Education Goals: All goals in this section met    Education Goals Met: Medication review.;Has system for taking medication.;Patient can state cardiac s/s and appropriate emergency response.                          Goals Met  60 day ADL'S goals met: Goal Partially Met: Pt has resumed vacuuming and tolerating doing the downstairs room. Continue to increase upper body strength     60 day Leisure goals met: Goal Partially Met- Patient has resumed carrying 15lb \"laundry basket\" up stairs. Continue to increase METs to mock swimming    60 day Work goals met: Retired     60 Day Progression: Pt has reached 5.7 METs on stairs, 4.2 on TM       30 day ADL'S goals met: Pt has resumed using her broom vaccum which is much lighter  and she can vacuuming 3 areas. However she wants to be able to use her heavier vacuum-because it does a better job.    30 day Leisure goals met: Goal not met: The weather has not permitted for her to resume light swimming. Continue to increases level in CR to mock swimming. The out door pool will not be ready until early July.     30 day Work goals met: Retired    30 Day Progression: Pt has reached a 4 MET on TM. 5.2MET on Stairs. Continue to reach LTG of 5-6METs with more energy, less fatigue and       Initial ADL's goals met: Goal met- pt has resumed showering/bathing with less fatigue.    Initial Leisure goals met: Goal met- has resumed light house cleaning.    Intial Work goals met: " Retired      Exercise Prescription  Exercise Mode: Treadmill;Bike;Nustep;Arm Erg.;Hallway Walking;Stairs    Frequency: 2x/week    Duration: 30-60minutes    Intensity / THR: 20-30 beats above resting heart rate    RPE 11-14  Progression / Met level: 5+    Resistive Training?: Yes      Current Exercise (mins/week): 180      Interventions  Home Exercise:  Mode: Walking    Frequency: 2-3x/week    Duration: 30-60 minutes      Education Material : Educational videos;Provide written material;Individual education and counseling;Offer educational classes      Education Completed  Exercise Education Completed: Cardiac Anatomy;Signs and Symptoms;Medication review;RPE;Emergency Plan;Home Exercise;Warm up/cool down;FITT Principles;BP/HR Reponse to exercise;Stretching;Strength training;Benefits of Exercise;End point of exercise              Exercise Follow-up/Discharge  Follow up/Discharge: Pt continues to reach LTG of 5-6 METs. Pt continues to be limited due to SOB. Pt has talked to physician about side effects of Brilinta. Alternative medication to Bralinta gives pt stomach ache. Pt O2 sats WNL. Pt has reached limit of speed of 2.5 on TM but increases progress with elevation.    NUTRITION  Nutrition Assessment: Reassessment      Nutrition Risk Factors:  Nutrition Risk Factors: Dyslipidemia  Cholesterol: 219  LDL: 166  HDL: 334  Triglycerides: 94      Nutrition Plan  Interventions  Diet Consult: Declined    Other Nutrition Intervention: Diet Class;Therapist/Pt Discussion;Provide with Written Material    Initial Rate Your Plate Score: 69    Education Completed  Nutrition Education Completed: Low Saturated fat diet;Risk factor overview;Low sodium diet      Goals  Nutrition Goals (Next 30 days): Improve Rate Your Plate Survey Score;Patient demonstrated understanding of cardiac nutrition, no goals identified for the next 30 days      Goals Met  Nutrition Goals Met: Patient can identify their risk factors for CAD;Patient follows a  "low sodium diet;Patient states following a low saturated fat diet;Reviewed Dietitian schedule;Provided Rate your Plate Survey;Completed Nutritional Risk Screen;Patient knows appropriate portion size      Height, Weight, and  BMI  Weight: 136 lb 9.6 oz (62 kg)  Height: 5' 1\" (1.549 m)  BMI: 25.82      Nutrition Follow-up  Follow-up/Discharge: Pt attended meal planning and soluable fiber class.         Other Risk Factors  Other Risk Factor Assessment: Reassessment      HTN Risk Factor: Hypertension      Pre Exercise BP: 114/64  Post Exercise BP: 108/64      Hypertension Plan  Goals  HTN Goals: Patient demonstrates understanding of HTN, no goals identified for the next 30 days      Goals Met  HTN Goals Met: Follow low sodium diet;Take medication as prescribed;Exercises regularly      HTN Interventions  HTN Interventions: Diet consult;Therapist/patient discussion;Provide written material;Offer educational classes      HTN Education Completed  HTN Education Completed: Low sodium diet;Medication review;Risk factor overview      Tobacco Risk Factor: NA    Risk Factor Follow-up   Follow-up/Discharge: CRF: Family Hx, HTN, CHOL     PSYCHOSOCIAL  Psychosocial Assessment: Reassessment       [facility] Q [facility] L Summary Score: 18      PHQ-9 Total Score: 16 (MARLON-D Score)      Psychosocial Risk Factor: NA      Psychosocial Plan  Interventions  Interventions: Offer educational videos and classes;Provide written material;Individual education and counseling      Education Completed  Education Completed: Relaxation/Coping Techniques;S/S of depression;Effects of stress on body      Goals  Goals (Next 30 days): Patient demonstrates understanding of stress, no goals identified for the next 30 days      Goals Met  Goals Met: Identified Support system;Oriented to stress management classes;Identify stressors;Practicing stress management skills      Psychosocial Follow-up  Follow-up/Discharge: Pt enjoys listening to music, socializing with " friends and going out for coffee/tea. Pt has a great support system.             Patient involved in Goal setting?: Yes      Signature: _____________________________________________________________    Date: __________________    Time: __________________

## 2021-05-29 NOTE — PROGRESS NOTES
"ITP ASSESSMENT   Assessment Day: 60 Day    Session Number: 15  Precautions: Standard cardiac sx, CAD, T3 compressed vertebrae    Diagnosis: MI;Stent    Risk Stratification: High    Referring Provider: Dr. Chilel  ITP: Dr. Eaton  EXERCISE  Exercise Assessment: Reassessment                          Exercise Plan  Goals Next 30 days  ADL'S: Resume using heavy vacuum and tolerate doing the downstiars room ~4+METs. Start using weights more regularly and/or Arm Erg to increase UE strength for all goals.    Leisure: Increase METs to mock swimming (~5-6METs) Resume carrying 15lbs \"laundry basket\" up stairs ~5METs.     Work: Retired      Education Goals: All goals in this section met    Education Goals Met: Medication review.;Has system for taking medication.;Patient can state cardiac s/s and appropriate emergency response.                          Goals Met  30 day ADL'S goals met: Pt has resumed using her broom vaccum which is much lighter  and she can vacuuming 3 areas. However she wants to be able to use her heavier vacuum-because it does a better job.    30 day Leisure goals met: Goal not met: The weather has not permitted for her to resume light swimming. Continue to increases level in CR to mock swimming. The out door pool will not be ready until early July.     30 day Work goals met: Retired    30 Day Progression: Pt has reached a 4 MET on TM. 5.2MET on Stairs. Continue to reach LTG of 5-6METs with more energy, less fatigue and       Initial ADL's goals met: Goal met- pt has resumed showering/bathing with less fatigue.    Initial Leisure goals met: Goal met- has resumed light house cleaning.    Intial Work goals met: Retired      Exercise Prescription  Exercise Mode: Treadmill;Bike;Nustep;Arm Erg.;Hallway Walking;Stairs    Frequency: 2x/week    Duration: 30-60minutes    Intensity / THR: 20-30 beats above resting heart rate    RPE 11-14  Progression / Met level: 4-5+    Resistive Training?: (S) Yes (Caution with " compressed disc and left shoulder-wts vs bands)      Current Exercise (mins/week): 160      Interventions  Home Exercise:  Mode: Walking    Frequency: 2-3x/week    Duration: 30 minutes      Education Material : Educational videos;Provide written material;Individual education and counseling;Offer educational classes      Education Completed  Exercise Education Completed: Cardiac Anatomy;Signs and Symptoms;Medication review;RPE;Emergency Plan;Home Exercise;Warm up/cool down;FITT Principles;BP/HR Reponse to exercise;Stretching;Strength training;Benefits of Exercise;End point of exercise              Exercise Follow-up/Discharge  Follow up/Discharge: Pt needs to continue with CR to reach LTG of 5-6METs.  She is limited due to deconditioned, fatigue and SOBPt continues to have slight SOB and she was encouraged to talk with physician about side effects of medications like Brilinta. P't O2 sats are WNL. Pt is limited with increased speed on TM she feels that she can continue to progress with increased elevation. Consider changes in modalities as well to progress pt to her LTG with precautions considered.    NUTRITION  Nutrition Assessment: Reassessment      Nutrition Risk Factors:  Nutrition Risk Factors: Dyslipidemia  Cholesterol: 219  LDL: 166  HDL: 34  Triglycerides: 94      Nutrition Plan  Interventions  Diet Consult: Declined    Other Nutrition Intervention: Diet Class;Therapist/Pt Discussion;Provide with Written Material    Initial Rate Your Plate Score: 69      Education Completed  Nutrition Education Completed: Low Saturated fat diet;Risk factor overview;Low sodium diet      Goals  Nutrition Goals (Next 30 days): Improve Rate Your Plate Survey Score;Patient demonstrated understanding of cardiac nutrition, no goals identified for the next 30 days      Goals Met  Nutrition Goals Met: Patient can identify their risk factors for CAD;Patient follows a low sodium diet;Patient states following a low saturated fat  "diet;Reviewed Dietitian schedule;Provided Rate your Plate Survey;Completed Nutritional Risk Screen;Patient knows appropriate portion size      Height, Weight, and  BMI  Weight: 137 lb (62.1 kg)  Height: 5' 1\" (1.549 m)  BMI: 25.9      Nutrition Follow-up  Follow-up/Discharge: Pt has attended weight management and soluable fiber education classes. Pt overall feels that she is eating healthy and less sweets (not alot of honey added to tea). Pt eats lean cuts of meats and fish, low fat, low salt and staying <2000mg         Other Risk Factors  Other Risk Factor Assessment: Reassessment      HTN Risk Factor: Hypertension      Pre Exercise BP: 110/64  Post Exercise BP: 112/60 (118/63 automatic cuff from home)      Hypertension Plan  Goals  HTN Goals: Patient demonstrates understanding of HTN, no goals identified for the next 30 days      Goals Met  HTN Goals Met: Follow low sodium diet;Take medication as prescribed;Exercises regularly      HTN Interventions  HTN Interventions: Diet consult;Therapist/patient discussion;Provide written material;Offer educational classes      HTN Education Completed  HTN Education Completed: Low sodium diet;Medication review;Risk factor overview      Tobacco Risk Factor: NA    Risk Factor Follow-up   Follow-up/Discharge: CRF: Family Hx, HTN, CHOL-pt understands CRF and is managing with exercise, medications and eating heart healthy overall.      PSYCHOSOCIAL  Psychosocial Assessment: Reassessment       [facility] DIANN Q of L Summary Score: 18      PHQ-9 Total Score: 16 (MARLON-D Score)      Psychosocial Risk Factor: NA      Psychosocial Plan  Interventions  Interventions: Offer educational videos and classes;Provide written material;Individual education and counseling      Education Completed  Education Completed: Relaxation/Coping Techniques;S/S of depression;Effects of stress on body      Goals  Goals (Next 30 days): Patient demonstrates understanding of stress, no goals identified for the " next 30 days      Goals Met  Goals Met: Identified Support system;Oriented to stress management classes;Identify stressors;Practicing stress management skills      Psychosocial Follow-up  Follow-up/Discharge: Pt continues to manage stressors well and has a good support system. She enjoys watching TV, going to lunch/coffee/tea with friends, walks regularly and enjoys traveling for relaxation.             Patient involved in Goal setting?: Yes

## 2021-05-30 NOTE — PROGRESS NOTES
Aury Marinelli has participated in 28 sessions of Phase II Cardiac Rehab.    Progress Report:   Cardiac Rehab Treatment Progress Report 6/26/2019 7/1/2019 7/8/2019 7/10/2019 7/16/2019   Weight 135 lbs 10 oz 136 lbs 10 oz 136 lbs 135 lbs 13 oz 136 lbs   Pre Exercise  HR 73 78 72 66 68   Pre Exercise /68 118/68 110/62 106/62 112/60   Pre Blood Sugar (mg/dl) - - - - -   Treadmill Peak  108-112 - 93 97   Treadmill Peak Blood Pressure 132/64 142/60 - - -   Vision Peak Heart Rate/Peak BP - - 100, 128/60 95, 118/50 98, 126/52   Heart Rate 73 83 72 71 69   Post Exercise /68 106/60 98/60 94/58 88/58   ECG SR SR with rare PVC's on the stairs increased ectopy occasional PVC's and 1 couplet. SOB throughout exercise-denies twinges or palpatations with ectopy SR with rare PVC's SR SR   Total Exercise Minutes 50 40 35 47 48         Current Status:  Currently exercising without complaints or symptoms.  Pt has started doing interval training and tolerating well. Pt has reached 4.5 MET level.    If Physician recommends change in treatment plan, please place orders.        __________________________________________________      _____________  Signature                                                                                                  Date

## 2021-05-30 NOTE — PROGRESS NOTES
"ITP ASSESSMENT   Assessment Day: 120 Day    Session Number: 31  Precautions: Standard cardiac sx, CAD, T3 compressed vertebrae    Diagnosis: MI;Stent    Risk Stratification: High    Referring Provider: Taisha Hinton MD   ITP sent to Dr. Eaton  EXERCISE  Exercise Assessment: Reassessment                              Exercise Plan  Goals Next 30 days  ADL'S: Increase MET level to 5.4-6 to mock traveling to [place].    Leisure: Increase MET level to 5.4-6 to mock harder/longer swimming.    Work: Retired      Education Goals: All goals in this section met    Education Goals Met: Medication review.;Has system for taking medication.;Patient can state cardiac s/s and appropriate emergency response.                          Goals Met  90 day ADL'S goals met: Goal met- pt has increased U/E strength by using arm erg, but has been limited by L shoulder pain.    90 day Leisure goals met: Goal met- pt has increased MET level to 5 and has returned to light swimming for 5 min bouts.    90 day Work goals met: Retired      60 day ADL'S goals met: Goal Partially Met: Pt has resumed vacuuming and tolerating doing the downstairs room. Continue to increase upper body strength     60 day Leisure goals met: Goal Partially Met- Patient has resumed carrying 15lb \"laundry basket\" up stairs. Continue to increase METs to mock swimming    60 day Work goals met: Retired     60 Day Progression: Pt has reached 5.7 METs on stairs, 4.2 on TM       30 day ADL'S goals met: Pt has resumed using her broom vaccum which is much lighter  and she can vacuuming 3 areas. However she wants to be able to use her heavier vacuum-because it does a better job.    30 day Leisure goals met: Goal not met: The weather has not permitted for her to resume light swimming. Continue to increases level in CR to mock swimming. The out door pool will not be ready until early July.     30 day Work goals met: Retired    30 Day Progression: Pt has reached a 4 MET on TM. 5.2MET " on Stairs. Continue to reach LTG of 5-6METs with more energy, less fatigue and       Initial ADL's goals met: Goal met- pt has resumed showering/bathing with less fatigue.    Initial Leisure goals met: Goal met- has resumed light house cleaning.    Intial Work goals met: Retired        Exercise Prescription  Exercise Mode: Treadmill;Bike;Nustep;Arm Erg.;Hallway Walking;Stairs    Frequency: 3x/week    Duration: 30-45 mins    Intensity / THR: 20-30 beats above resting heart rate    RPE 11-14  Progression / Met level: 5.4-6    Resistive Training?: No      Current Exercise (mins/week): 180      Interventions  Home Exercise:  Mode: Walking    Frequency: 2-3x/week    Duration: 30-60 mins      Education Material : Educational videos;Provide written material;Individual education and counseling;Offer educational classes      Education Completed  Exercise Education Completed: Cardiac Anatomy;Signs and Symptoms;Medication review;RPE;Emergency Plan;Home Exercise;Warm up/cool down;FITT Principles;BP/HR Reponse to exercise;Stretching;Strength training;Benefits of Exercise;End point of exercise              Exercise Follow-up/Discharge  Follow up/Discharge: Pt has reached peak of 5 MET level on TM.  She is walking regularly at home, and has started some light swimming in her pool.   NUTRITION  Nutrition Assessment: Reassessment      Nutrition Risk Factors:  Nutrition Risk Factors: Dyslipidemia  Cholesterol: 219  LDL: 166  HDL: 334  Triglycerides: 94      Nutrition Plan  Interventions  Diet Consult: Declined    Other Nutrition Intervention: Diet Class;Therapist/Pt Discussion;Educational Videos;Provide with Written Material    Initial Rate Your Plate Score: 69    No data recorded    Education Completed  Nutrition Education Completed: Low Saturated fat diet;Risk factor overview;Low sodium diet      Goals  Nutrition Goals (Next 30 days): Improve Rate Your Plate Survey Score;Patient demonstrated understanding of cardiac nutrition, no  "goals identified for the next 30 days      Goals Met  Nutrition Goals Met: Patient can identify their risk factors for CAD;Patient follows a low sodium diet;Patient states following a low saturated fat diet;Reviewed Dietitian schedule;Provided Rate your Plate Survey;Completed Nutritional Risk Screen;Patient knows appropriate portion size      Height, Weight, and  BMI  Weight: 136 lb 4.8 oz (61.8 kg)  Height: 5' 1\" (1.549 m)  BMI: 25.77      Nutrition Follow-up  Follow-up/Discharge: Pt has attended meal planning and soluable fiber class.  She reports she is following low sodium, low sugar diet, with decreased red meat and increased chicken and fish consumption.  Also eating a lot of fruits and vegetables.         Other Risk Factors  Other Risk Factor Assessment: Reassessment      HTN Risk Factor: Hypertension      Pre Exercise BP: 108/60  Post Exercise BP: 94/52      Hypertension Plan  Goals  HTN Goals: Patient demonstrates understanding of HTN, no goals identified for the next 30 days      Goals Met  HTN Goals Met: Follow low sodium diet;Take medication as prescribed;Exercises regularly      HTN Interventions  HTN Interventions: Diet consult;Therapist/patient discussion;Provide written material;Offer educational classes      HTN Education Completed  HTN Education Completed: Low sodium diet;Medication review;Risk factor overview      Tobacco Risk Factor: NA        Risk Factor Follow-up   Follow-up/Discharge: Pt follows low sodium diet.     PSYCHOSOCIAL  Psychosocial Assessment: Reassessment       [facility] RUFF [facility] L Summary Score: 18      PHQ-9 Total Score: 16 (MARLON-D Score)      Psychosocial Risk Factor: NA      Psychosocial Plan  Interventions  Interventions: Offer educational videos and classes;Provide written material;Individual education and counseling      Education Completed  Education Completed: Relaxation/Coping Techniques;S/S of depression;Effects of stress on body      Goals  Goals (Next 30 days): Patient " demonstrates understanding of stress, no goals identified for the next 30 days      Goals Met  Goals Met: Identified Support system;Oriented to stress management classes;Identify stressors;Practicing stress management skills      Psychosocial Follow-up  Follow-up/Discharge: Pt denies regular stress.  Identifies family and friends as her support system.  Pt enjoys listening to music, socializing with friends, and going out for coffee.             Patient involved in Goal setting?: Yes      Signature: _____________________________________________________________    Date: __________________    Time: __________________

## 2021-05-31 NOTE — ANESTHESIA PROCEDURE NOTES
Arterial Line  Reason for Procedure: hemodynamic monitoring and multiple ABGs  Patient location during procedure: OR pre-induction  Start time: 8/26/2019 7:30 AM  End time: 8/26/2019 7:37 AM  Staffing:  Performing  Anesthesiologist: Yong Knight MD  Sterile Precautions:  sterile barriers used during insertion: cap, mask, sterile gloves, large sheet, and hand hygiene used.  Arterial Line:   Immediately prior to procedure a time out was called to verify the correct patient, procedure, equipment, support staff and site/side marked as required  Laterality: right  Location: radial  Prepped with: ChloroPrep    Needle gauge: 20 G  Number of Attempts: 1  Secured with: tape, transparent dressing and pressure dressing  Flushed with: saline  1% lidocaine local anesthesia used for skin prep.   See MAR for additional medications given.  Ultrasound evaluation of access site: yes  Vessel patent by US exam    Concurrent real time visualization of needle entry

## 2021-05-31 NOTE — ANESTHESIA PROCEDURE NOTES
Central line    Start time: 8/26/2019 7:54 AM  End time: 8/26/2019 8:07 AM  Patient location: OR Post-induction  Indications: central pressure monitoring and vascular access  Performing Anesthesiologist: Yong Knight MD  Pre-procedure Checklist  Completed: patient identified, site marked, risks, benefits, and alternatives discussed, timeout performed, consent obtained, hand hygiene performed, all elements of maximal sterile barriers used including cap, mask, gown, sterile gloves, and large sheet and skin prep agent completely dried prior to procedure    Procedure Details:  Preparation: 2% chlorhexidine  Location details: right internal jugular  Catheter type: Introducer with [place]  Introducer type: MAC  Lumens:double lumenNumber of attempts: 1  Ultrasound evaluation of access site: yes  Vessel patent by US exam  Concurrent real time visualization of needle entryTransduced for venous waveform  manometry confirmation of venous access    Post-procedure:   line sutured and Antimicrobial disks with CHG applied  Assessment: blood return through all ports and free fluid flow  Complications: none

## 2021-05-31 NOTE — PROGRESS NOTES
[facility] Home Exercise Program/Discharge Summary  You have reached a 5.1-6 MET level and have completed 36 sessions of Cardiac Rehab.   Exercise Goals:   4-6x/week for aerobic exercise 30-60 minutes  Modality Duration Intensity/  Rate of Perceived Exertion  (RPE: 11-14)   Warm-up 5 minutes 8-10   Walk 20+ 11-14   Treadmill 20+ 11-14   Cool Down 5 minutes 8-10   Stretching 5 minutes 8-10   Continuous Exercise Heart Rate Guidelines:   20-30bpm> RHR (Resting Heart Rate) or [place] (122 to 135 bpm)  Interval Exercise Program:  MIIT(1-5minutes): 50-60%: 114-122 bpm and/or RPE of 11-12  HIIT (1-2 minutes): 80%: 139 bpm and/or RPE of 15-17  Special Recommendations:    Continue to follow low fat, low salt, heart healthy diet.    Continue to follow up with your doctors/providers as recommended (e.g cholesterol).    A well rounded exercise program will included aerobic/cardiovascular exercise (e.g like walking, biking, or swimming ), strength training (e.g. free weights, exercise bands, or weight machines) and stretching program.   Stop Exercise!!! If any of the following occur:    Angina/chest pain    Dizziness    Excessive perspiration/cold sweats    Abnormal shortness of breath    Changes in heart rate (slow, fast, irregular)    Sudden fatigue or numbness    Nausea  Also...    Avoid extreme temperatures - exercise indoors if necessary:   Temp+ Humidity >160, Temp-Wind Chill <20    Wait at least 1 hour after a meal before strenuous activity    Do not exercise if you have a fever or are ill    Wear comfortable, supportive athletic clothing and shoes.  You are now on your way to a heart healthy lifestyle on your own. You can do it!

## 2021-05-31 NOTE — PROGRESS NOTES
"ITP ASSESSMENT   Assessment Day: 150 Day Last day    Session Number: 36  Precautions: Standard cardiac sx, CAD, T3 compressed vertebrae, Left Shoulder-Arthritis    Diagnosis: MI;Stent    Risk Stratification: High    Referring Provider: Taisha Hinton MD   ITP sent to Dr. Eaton  EXERCISE  Exercise Assessment: Discharge       6 Minute Walk Test   Pre   Pre Exercise HR: 71                    Pre Exercise BP: 94/56      Peak  Peak HR: 94                   Peak BP: 112/60    Peak feet: 1550    Peak O2 SAT: 97    Peak RPE: 11    Peak MPH: 2.94      Symptoms:  Peak Symptoms: none      5 mins. Post  5 Min Post HR: 68    5 Min Post BP: 98/60                           Exercise Plan  Goals Next 30 days  ADL'S: Increase MET level to 5.4-6 to mock traveling to [place].    Leisure: Increase MET level to 5.4-6 to mock harder/longer swimming.    Work: Retired      Education Goals: All goals in this section met    Education Goals Met: Medication review.;Has system for taking medication.;Patient can state cardiac s/s and appropriate emergency response.                          Goals Met  120 day ADL'S goals met: Increased MET level to 5.4-6 to mock traveling to [place]    120 day Leisure goals met: Increased MET level to 5.4-6 to mock harder/longer swimming    120 day Work goals met: Retired    120 Day Progress: Patient reached 5.1 MET level on treadmill and 6 MET level on stairs.      90 day ADL'S goals met: Goal met- pt has increased U/E strength by using arm erg, but has been limited by L shoulder pain.    90 day Leisure goals met: Goal met- pt has increased MET level to 5 and has returned to light swimming for 5 min bouts.    90 day Work goals met: Retired      60 day ADL'S goals met: Goal Partially Met: Pt has resumed vacuuming and tolerating doing the downstairs room. Continue to increase upper body strength     60 day Leisure goals met: Goal Partially Met- Patient has resumed carrying 15lb \"laundry basket\" up stairs. Continue " to increase METs to mock swimming    60 day Work goals met: Retired     60 Day Progression: Pt has reached 5.7 METs on stairs, 4.2 on TM       30 day ADL'S goals met: Pt has resumed using her broom vaccum which is much lighter  and she can vacuuming 3 areas. However she wants to be able to use her heavier vacuum-because it does a better job.    30 day Leisure goals met: Goal not met: The weather has not permitted for her to resume light swimming. Continue to increases level in CR to mock swimming. The out door pool will not be ready until early July.     30 day Work goals met: Retired    30 Day Progression: Pt has reached a 4 MET on TM. 5.2MET on Stairs. Continue to reach LTG of 5-6METs with more energy, less fatigue and       Initial ADL's goals met: Goal met- pt has resumed showering/bathing with less fatigue.    Initial Leisure goals met: Goal met- has resumed light house cleaning.    Intial Work goals met: Retired      Exercise Prescription  Exercise Mode: Treadmill;Bike;Nustep;Arm Erg.;Hallway Walking;Stairs    Frequency: 3x/week    Duration: 30-45 mins    Intensity / THR: 20-30 beats above resting heart rate    RPE 11-14  Progression / Met level: 5.4-6    Resistive Training?: No      Current Exercise (mins/week): 180      Interventions  Home Exercise:  Mode: walking    Frequency: 2-3x/week    Duration: 30-60 min.      Education Material : Educational videos;Provide written material;Individual education and counseling;Offer educational classes      Education Completed  Exercise Education Completed: Cardiac Anatomy;Signs and Symptoms;Medication review;RPE;Emergency Plan;Home Exercise;Warm up/cool down;FITT Principles;BP/HR Reponse to exercise;Stretching;Strength training;Benefits of Exercise;End point of exercise              Exercise Follow-up/Discharge  Follow up/Discharge: Patient reached 5.1 MET level on treadmill and 6 MET level on stairs.  Patient plans to walk and plans on joining that gym for home  "exercise well.   NUTRITION  Nutrition Assessment: Discharge      Nutrition Risk Factors:  Nutrition Risk Factors: Dyslipidemia  Cholesterol: 219  LDL: 166  HDL: 334  Triglycerides: 94      Nutrition Plan  Interventions  Diet Consult: Declined    Other Nutrition Intervention: Diet Class;Therapist/Pt Discussion    Initial Rate Your Plate Score: 69    Pre Initial Rate Your Plate Score: 69   No data recorded    Education Completed  Nutrition Education Completed: Low Saturated fat diet;Risk factor overview;Low sodium diet      Goals  Nutrition Goals (Next 30 days): Improve Rate Your Plate Survey Score;Patient demonstrated understanding of cardiac nutrition, no goals identified for the next 30 days      Goals Met  Nutrition Goals Met: Patient can identify their risk factors for CAD;Patient follows a low sodium diet;Patient states following a low saturated fat diet;Reviewed Dietitian schedule;Provided Rate your Plate Survey;Completed Nutritional Risk Screen;Patient knows appropriate portion size      Height, Weight, and  BMI  Weight: 136 lb (61.7 kg)  Height: 5' 1\" (1.549 m)  BMI: 25.71    Pre BMI: 25.71     Nutrition Follow-up  Follow-up/Discharge: Patient reports she is eating a heart healthy diet, she is following low sodium, low sugar diet, with decreased red meat and increased chicken and fish consumption.  Also eating a lot of fruits and vegetables.         Other Risk Factors  Other Risk Factor Assessment: Discharge      HTN Risk Factor: Hypertension      Pre Exercise BP: 94/56  Post Exercise BP: 92/60      Hypertension Plan  Goals  HTN Goals: Patient demonstrates understanding of HTN, no goals identified for the next 30 days      Goals Met  HTN Goals Met: Follow low sodium diet;Take medication as prescribed;Exercises regularly      HTN Interventions  HTN Interventions: Diet consult;Therapist/patient discussion;Provide written material;Offer educational classes      HTN Education Completed  HTN Education Completed: " Low sodium diet;Medication review;Risk factor overview      Tobacco Risk Factor: NA      Risk Factor Follow-up   Follow-up/Discharge: Pt follows low sodium diet.     PSYCHOSOCIAL  Psychosocial Assessment: Discharge       [facility] COOP Q of L Summary Score: 16    Pre [facility] COOP Q of L Summary Score: 16     No data recorded  Pre       Psychosocial Risk Factor: NA      Psychosocial Plan  Interventions  If PHQ-9 is >9, send letter to MD  Interventions: Offer educational videos and classes;Provide written material;Individual education and counseling      Education Completed  Education Completed: Relaxation/Coping Techniques;S/S of depression;Effects of stress on body      Goals  Goals (Next 30 days): Patient demonstrates understanding of stress, no goals identified for the next 30 days      Goals Met  Goals Met: Identified Support system;Oriented to stress management classes;Identify stressors;Practicing stress management skills      Psychosocial Follow-up  Follow-up/Discharge: Pt denies regular stress.  Identifies family and friends as her support system.  Pt enjoys listening to music, socializing with friends, and going out for coffee.       Patient involved in Goal setting?: Yes      Signature: _____________________________________________________________    Date: __________________    Time: __________________

## 2021-05-31 NOTE — ANESTHESIA CARE TRANSFER NOTE
Last vitals:   Vitals:    08/26/19 1242   BP: 156/77   Pulse: 79   Resp: 15   Temp: 36.2  C (97.1  F)   SpO2: 100%     Patient's level of consciousness is unresponsive  Spontaneous respirations: no: ETT  Maintains airway independently: no: ETT  Dentition unchanged: yes  Oropharynx: endotracheal tube in place    QCDR Measures:  ASA# 20 - Surgical Safety Checklist: WHO surgical safety checklist completed prior to induction    PQRS# 430 - Adult PONV Prevention: 4558F - Pt received => 2 anti-emetic agents (different classes) preop & intraop  ASA# 8 - Peds PONV Prevention: 4558F - Pt received => 2 anti-emetic agents (different classes) preop & intraop  PQRS# 424 - Judith-op Temp Management: 4559F - At least one body temp DOCUMENTED => 35.5C or 95.9F within required timeframe  PQRS# 426 - PACU Transfer Protocol: - Transfer of care checklist used  ASA# 14 - Acute Post-op Pain: ASA14B - Patient did NOT experience pain >= 7 out of 10

## 2021-05-31 NOTE — ANESTHESIA PROCEDURE NOTES
NINA    Patient location during procedure: OR  Start time: 8/26/2019 8:12 AM  Staffing:  Performing  Anesthesiologist: Yong Knight MD  NINA:  Type/Reason: Monitoring NINA  Technique: blind insertion  Difficulty: easy  Anesthesia Monitoring: see additional note

## 2021-05-31 NOTE — ANESTHESIA PREPROCEDURE EVALUATION
Anesthesia Evaluation      Patient summary reviewed   No history of anesthetic complications     Airway   Mallampati: II  Neck ROM: full   Pulmonary - normal exam    breath sounds clear to auscultation  (-) sleep apnea, not a smoker    ROS comment: EXAM: XR CHEST 1 VIEW PORTABLE  LOCATION: [facility]  DATE/TIME: 8/22/2019 1:22 AM     INDICATION: chest pain  COMPARISON: 08/21/2019     FINDINGS: Negative chest.                         Cardiovascular - normal exam  (+) hypertension, past MI, CAD, CABG/stent, angina, , hypercholesterolemia,     (-) no pacemaker, valvular problems/murmurs  ECG reviewed  Rhythm: regular  Rate: normal,      ROS comment:   Left ventricle ejection fraction is normal. The calculated left ventricular ejection fraction is 69%.    Normal left ventricular size.    Left atrial volume is mildly increased.    Normal right ventricular size and systolic function.    No hemodynamically significant valvular heart abnormalities.    When compared to the previous study dated 3/31/2019, no significant change.    Prox LAD lesion is 90% stenosed.    Ost LM to Mid LM lesion is 90% stenosed.    The RCA was moderate.     Pt with hx of PCI to LAD/Circ 4/1, changed from brillinta to clopidogrel last month due to dyspnea, with two days of angina and non Q wave MI peak troponin ~2, on iv NTG and pain free.  Pt turned down surgery at that time due to pt preference.  Noted small vessels.      Angiography via left radial  LM mid to distal 90%  LAD instent restenosis 90% thrombotic  Circ patent stents  RCA mild dz     Imp/plan  1. ACS with restenosis of mid LAD thrombotic lesion and new LM disease - cont iv NTG, heparin, raise asp 162mg daily, hold on IABP given pain free, raise crestor 40mg, CT surgery aware, stop clopidogrel, echo today and carotid US, adm to ICU   No angina today 8/25 or since admission     Neuro/Psych      Comments: Carotid stenosis  IMPRESSION:   CONCLUSION:  1.  On the right, there is a  "moderate amount of atheromatous plaque.  Peak systolic velocities in the ICA are 255 cm/s (previously 309 cm/s) which correspond to the severe 70-99% stenosis range based on NASCET criteria. Normal flow velocities in the CCA   and ECA.  2.  On the left, there is a moderate amount of atheromatous plaque.  Peak systolic velocities in the ICA are 117 cm/s (previously 136 cm/s) which correspond to the normal less than 50% stenosis range based on NASCET criteria. Normal flow velocities in   the CCA and ECA.  3.  Antegrade flow within the vertebral arteries bilaterally.    60% right and 20% left by CT    Endo/Other    (+) arthritis,   (-) no diabetes, hypothyroidism, hyperthyroidism, no obesity, not pregnant     Comments: Fibrocystic breast    GI/Hepatic/Renal    (+) hiatal hernia, GERD,     (-) renal disease, esophageal disease    Comments: IBS  Small HH  UA normal     Other findings: Results for FRITZ PATEL (MRN [id number]) as of 8/25/2019 09:35    8/23/2019 02:47  Sodium: 139  Potassium: 4.0  Chloride: 108 (H)  CO2: 22  Anion Gap, Calculation: 9  BUN: 9  Creatinine: 0.68  GFR MDRD Af Amer: >60  GFR MDRD Non Af Amer: >60  Calcium: 9.2  Magnesium: 2.2  Troponin I: 1.78 (HH)  Glucose: 89  Anti-Xa Heparin Assay: 0.30    Hgb - 10.8      Dental - normal exam                        Anesthesia Plan  Planned anesthetic: general endotracheal  Note allergies - Nausea with codeine, hydrocodone and morphine. Succinylcholine \"makes her feel terrible\" but pt denies ever having heard of the drug. No personal or family hx of life threatening anesthesia reactions. Right hand weaker than left b/c of arthritis. She would like ICU to know for wake up if  strength is a measure of readiness for extubation. She requests a \"smaller\" ETT b/c of her size (7.0 seems appropriate).    Phenylephrine inline  Methadone  Decadron 10 mg  Zofran  Ketamine 25 mg  Magnesium 1gram/hour x 4 hours  ASA 3   Induction: intravenous   Anesthetic plan and " risks discussed with: patient and spouse  Anesthesia plan special considerations: antiemetics, CVP line, arterial catheterization, pulmonary artery catheterization, IV therapy two IVs, dexmedetomidine  Post-op plan: other, extended intubation/vent support and routine recovery (ICU)

## 2021-05-31 NOTE — ANESTHESIA POSTPROCEDURE EVALUATION
Patient: Aury Marinelli  CORONARY ARTERY BYPASS ENDOSCOPIC VEIN HARVEST INTERNAL MAMMARY ARTERY ANESTHESIA TRANSESOPHAGEAL ECHOCARDIOGRAM  Anesthesia type: general    Patient location: ICU  Last vitals:   Vitals Value Taken Time   /77 8/26/2019 12:42 PM   Temp 36.9  C (98.4  F) 8/26/2019  3:00 PM   Pulse 79 8/26/2019  3:52 PM   Resp 15 8/26/2019 12:42 PM   SpO2 99 % 8/26/2019  3:52 PM   Vitals shown include unvalidated device data.  Post vital signs: stable  Level of consciousness: awake and intermittently sleeping  Post-anesthesia pain: pain controlled  Post-anesthesia nausea and vomiting: no  Pulmonary: ETT, ventilator  Cardiovascular: stable and blood pressure at baseline  Hydration: adequate  Anesthetic events: no    QCDR Measures:  ASA# 11 - Judith-op Cardiac Arrest: ASA11B - Patient did NOT experience unanticipated cardiac arrest  ASA# 12 - Judith-op Mortality Rate: ASA12B - Patient did NOT die  ASA# 13 - PACU Re-Intubation Rate: ASA13X - Exclusion: organ donor or direct ICU transfer  ASA# 10 - Composite Anes Safety: ASA10A - No serious adverse event    Additional Notes:

## 2021-06-01 NOTE — TELEPHONE ENCOUNTER
----- Message from Haleigh Esposito sent at 9/24/2019  9:22 AM CDT -----  Regarding: CLL  Caller: Aury    Primary cardiologist: Dr. Nolan    Detailed reason for call: Aury states she' rather have the letter mailed to her home instead of picking it up at [facility] location. Please call back if questions.     Best phone number: [phone]  Best time to contact: Any  Ok to leave a detailed message? Yes  Device? No

## 2021-06-01 NOTE — PROGRESS NOTES
Cardiac Rehab  Phase II Assessment    Assessment Date: 9/25/2019  Diagnosis: NSTEMI, CABG x3  Date of Onset: 8/21/19, 8/26/19  Procedure: Angio-in stent restenosis (8/22/19), CAB(8/26/19)  ICD/Pacemaker: No Parameters: N/A  Post-op Complications: anemia-blood loss. Needed Bl transfusion  ECG History: SR, SB, SR/1'AVB EF%:69 TTE 8/22/19  Past Medical History:       NSTEMIx 2 (4/1/19 and 8/21/19-due to in-stent restenosis)    Patient Active Problem List   Diagnosis     Premature Menopause     Eczema     Osteoarthritis     Hypercholesterolemia     Anemia     Irritable Bowel Syndrome     Female Infertility     NSTEMI (non-ST elevated myocardial infarction) (H)     ACS (acute coronary syndrome) (H)     Essential hypertension     Coronary artery disease due to lipid rich plaque     Chest pain, unspecified     Non-traumatic compression fracture of T3 thoracic vertebra with routine healing, subsequent encounter     Unstable angina (H)     Carotid stenosis     S/P CABG x 3     Slow transit constipation     Past Medical History:   Diagnosis Date     Breast injury     hit cabinet on left axillary side     Coronary artery disease      Fibrocystic breast      GERD (gastroesophageal reflux disease)      Hyperlipidemia      Hypertension      IBS (irritable bowel syndrome)      Past Surgical History:   Procedure Laterality Date     CV CORONARY ANGIOGRAM N/A 4/1/2019    Procedure: Coronary Angiogram;  Surgeon: Nilo Voss MD;  Location: [facility];  Service: Cardiology     CV CORONARY ANGIOGRAM N/A 4/2/2019    Procedure: Coronary Angiogram;  Surgeon: Rick Chilel MD;  Location: [facility];  Service: Cardiology     CV CORONARY ANGIOGRAM N/A 8/22/2019    Procedure: Coronary Angiogram;  Surgeon: Rick Chilel MD;  Location: [facility];  Service: Cardiology     IR PLEURAL DRAINAGE W CATHETER INSERTION  8/27/2019     TN LAP,UTERUS,UNLISTED PROCEDURE      Description: Laparoscopy  Of Uterus;  Recorded: 01/13/2009;     Social History     Socioeconomic History     Marital status:      Spouse name: Not on file     Number of children: Not on file     Years of education: Not on file     Highest education level: Not on file   Occupational History     Not on file   Social Needs     Financial resource strain: Not on file     Food insecurity:     Worry: Not on file     Inability: Not on file     Transportation needs:     Medical: Not on file     Non-medical: Not on file   Tobacco Use     Smoking status: Never Smoker     Smokeless tobacco: Never Used   Substance and Sexual Activity     Alcohol use: No     Frequency: Never     Drug use: No     Sexual activity: Not on file   Lifestyle     Physical activity:     Days per week: Not on file     Minutes per session: Not on file     Stress: Not on file   Relationships     Social connections:     Talks on phone: Not on file     Gets together: Not on file     Attends Confucianism service: Not on file     Active member of club or organization: Not on file     Attends meetings of clubs or organizations: Not on file     Relationship status: Not on file     Intimate partner violence:     Fear of current or ex partner: Not on file     Emotionally abused: Not on file     Physically abused: Not on file     Forced sexual activity: Not on file   Other Topics Concern     Not on file   Social History Narrative     Not on file     Family History   Problem Relation Age of Onset     Breast cancer Maternal Grandmother      Physical Assessment  Precautions/ Physical Limitations: Surgical, Chronic Back Pain, NSTEMI x2 within 4 months, CAD, Carotid Stenosis  Oxygen: No  O2 Sats: 97 Lung Sounds: LLL diminshed      Edema: none  Incisions:  Healing very well-no drainage  Sleeping Pattern: fair   Appetite: fair   Nutrition Risk Screen: Appetite/Intake=Pt finds that foods just don't taste right at this time however she is eating nutriously and uses supplements as well. Pt would  like to maintain weight !130lbs. Current weight in CR is 132.1lbs and home scale is 129lbs    Pain  Location: left pectoral/axillary, in between shoulder blades  Characteristics:ache  Intensity: (0-10 scale) 3  Current Pain Management: Tylenol, Ultram  Intervention: pain medications, sternal precautions  Response: help take edge off pain, helps her sleep fair at night    Psychosocial/ Emotional Health  1. In the past 12 months, have you been in a relationship where you have been abused physically, emotionally, sexually or financially? No  notified: NA  2. Who do you turn to for emotional support?: , Family  3. Do you have cultural or spiritual needs? No  4. Have there been any major life changes in the past 12 months? Yes 2 heart attacks and CABx3    Referral Information  Primary Physician: Taisha Hinton MD  Cardiologist: Dr. Nolan  Surgeon: Dr. Clinton    [place] exercise/Equipment: No ex equipment. Gym Membership at [employer] in [place]    Patient's long-term goal(s): Resume all activities: walking, swimming, traveling to [facility] , Winter months in [facility]-walking on the beach, carry luggage, gardening, household duties, ADL's and drinking coffee    1. Living Accommodations: Home Steps: Yes      Support people at home:    2. Marital Status:   3. Family is able to assist with cares      Faith/Community involvement: group of friends get together's for coffee/tea  4. Recreation/Hobbies: (see above LTG) traveling, watch TV, enjoy [employer]

## 2021-06-01 NOTE — TELEPHONE ENCOUNTER
"Pt called stating she feels very breathless and \"out of it\" and as if her heart is pumping hard after she takes her carvedilol for a couple of hours. Instructed pt to stop carvedilol and double her lisinopril dose to 10 mg per SHI Jeff until her postop appointment on 9/17/19. Answered all questions.  "

## 2021-06-01 NOTE — TELEPHONE ENCOUNTER
ACTIVITY  How is your activity tolerance? Up often at home, going up and down stairs.    POST OP MONITORING  How is your pain on a 0-10 scale, how are you managing your pain? Pain is being managed well with a muscle relaxant for her back injury.    Are you still doing sternal precautions? Yes.  Do you hear any clicking when you are moving or taking a deep breath? No.    Are you weighing yourself daily? Yes, weight is currently 129 lbs. Lasix and potassium last dose is today.    SIGNS AND SYMPTOMS OF INFECTION  1. INCREASE IN PAIN - None   2. FEVER - None  3. DRAINAGE - None  4. REDNESS - None  5. SWELLING - None    ASSISTANCE  Do you have someone at home to assist you with your daily activities?  is helping at home with activities and medications.    MEDICATIONS  Is someone helping you to set up your medications?  is helping pt.  Do you have any questions about your medications? Pt confirming she can take her muscle relaxant with her current cardiac meds, told pt that is ok.    Are you on a blood thinner? No.  Who is managing your INRs? N/A    FOLLOW UP  Are you scheduled for cardiac rehab? Not yet, they are figuring out their insurance currently.    You are scheduled to see our surgery advanced practive provider for post operative follow up on 9/17  You are scheduled to see your cardiologist on 10/4  You are scheduled to see you primary care physician on - pt saw PCP this week already in [place].     CONTACT INFORMATION  Please feel free to call us with any questions or symptoms that are concerning for you at [phone]. If it is after 4:30 in the afternoon, or a weekend please call [phone] and ask for the on call specialist. We want to do everything we can to help prevent you needing to return to the ED, so please do not hesitate to call us.    Itzel Pandya, RNCC  Cardiovascular Surgery

## 2021-06-01 NOTE — TELEPHONE ENCOUNTER
Letter sent to patient through [employer] because email communication not appropriate form of patient communication.  mg

## 2021-06-01 NOTE — TELEPHONE ENCOUNTER
Message rec'd from dio SheetsElizabeth 9-25-19 @ 0933        Caller: Pt   Primary cardiologist: Ovidio   Detailed reason for call: Pt would like to know if she could have the letter sent to her email so she doesn't have to wait for the mail. Her email address is [email or web address].   Best phone number: [phone]   Best time to contact: Any   Ok to leave a detailed message? Yes   Device? No

## 2021-06-01 NOTE — PATIENT INSTRUCTIONS - HE
S/P coronary artery bypass graft x 3 with LIMA-LAd, rSVG_Diag, rSVG-OM, EVH from [facility] on 08/26/2019  Discharged to home 08/31/2019 and presented today with her  for postop CV surgery follow up  Stated that incisional pain has been manageable, but her chronic back pain has been the issue, and breathing is good  Patient has had issues with constipation when she first went home, but it is getting better  Incisions are healing well, lung sounds are clear and no sternal instability is appreciated  Appetite was initially poor is getting better, but she still can't taste her food  Constipation issues as noted above, and she voids without difficulties  Has not started outpatient cardiac rehab yet, but will scheduled and start as soon as possible  Saw her PCP Taisha Canales MD 09/03/2019  Scheduled to se Dr Mary Lomax MD on 10/04/2019  Patient had difficulties with Coreg 6.25 mg two times a day, and had to stop  Will try reduce dose of Coreg 3.125 mg two times a day and evaluate in a week  Will also restart Plavix 75 mg daily  Okay to start driving by 09/26/2019  Advised to take Mag-Citrate 296 ml daily as needed for constipation  Patient is a retired   Patient is otherwise doing well and will not need any further CV surgery follow up, but is advised to call with questions

## 2021-06-01 NOTE — PROGRESS NOTES
"  Assessment:       1. S/P coronary artery bypass graft x 3 with LIMA-LAd, rSVG_Diag, rSVG-OM, EVH from [facility] on 08/26/2019  clopidogrel (PLAVIX) 75 mg tablet    lisinopril (PRINIVIL,ZESTRIL) 2.5 MG tablet    carvedilol (COREG) 3.125 MG tablet     /62 (Patient Site: Left Arm, Patient Position: Sitting, Cuff Size: Adult Regular)   Pulse 92   Temp 98.3  F (36.8  C) (Oral)   Resp 16   Ht 5' 1\" (1.549 m)   Wt 129 lb 14.4 oz (58.9 kg)   BMI 24.54 kg/m      Plan:       S/P coronary artery bypass graft x 3 with LIMA-LAd, rSVG_Diag, rSVG-OM, EVH from [facility] on 08/26/2019  Discharged to home 08/31/2019 and presented today with her  for postop CV surgery follow up  Stated that incisional pain has been manageable, but her chronic back pain has been the issue, and breathing is good  Patient has had issues with constipation when she first went home, but it is getting better  Incisions are healing well, lung sounds are clear and no sternal instability is appreciated  Appetite was initially poor is getting better, but she still can't taste her food  Constipation issues as noted above, and she voids without difficulties  Has not started outpatient cardiac rehab yet, but will scheduled and start as soon as possible  Saw her PCP Taisha Canales MD 09/03/2019  Scheduled to se Dr Mary Lomax MD on 10/04/2019  Patient had difficulties with Coreg 6.25 mg two times a day, and had to stop  Will try reduce dose of Coreg 3.125 mg two times a day and evaluate in a week  Will also restart Plavix 75 mg daily  Okay to start driving by 09/26/2019  Advised to take Mag-Citrate 296 ml daily as needed for constipation  Patient is a retired   Patient is otherwise doing well and will not need any further CV surgery follow up, but is advised to call with questions    Current Outpatient Medications   Medication Sig     acetaminophen (TYLENOL) 325 MG tablet Take 2 tablets (650 mg total) by mouth every 4 " (four) hours as needed for pain or fever (Headache).     aspirin 81 MG EC tablet Take 1 tablet (81 mg total) by mouth daily.     calcium-vitamin D (CALCIUM-VITAMIN D) 500 mg(1,250mg) -200 unit per tablet Take 1 tablet by mouth every other day.     diclofenac sodium (VOLTAREN) 1 % Gel Apply to back over the most tender area 4 times day as needed     multivitamin therapeutic tablet Take 1 tablet by mouth every other day.     rosuvastatin (CRESTOR) 10 MG tablet Take 10 mg by mouth at bedtime.     traMADol (ULTRAM) 50 mg tablet Take 1 tablet (50 mg total) by mouth every 6 (six) hours as needed for pain. (Patient taking differently: Take 25 mg by mouth every 6 (six) hours as needed for pain.       )     carvedilol (COREG) 3.125 MG tablet Take 1 tablet (3.125 mg total) by mouth 2 (two) times a day with meals.     clopidogrel (PLAVIX) 75 mg tablet Take 1 tablet (75 mg total) by mouth daily.     ferrous sulfate 325 (65 FE) MG tablet Take 1 tablet (325 mg total) by mouth 2 (two) times a day with meals.     furosemide (LASIX) 20 MG tablet Take 1 tablet (20 mg total) by mouth daily for 5 days.     lisinopril (PRINIVIL,ZESTRIL) 2.5 MG tablet Take 1 tablet (2.5 mg total) by mouth daily.     magnesium hydroxide (MILK OF MAG) 400 mg/5 mL Susp suspension Take 30 mL by mouth daily as needed (Constipation).     nitroglycerin (NITROSTAT) 0.4 MG SL tablet Place 1 tablet (0.4 mg total) under the tongue every 5 (five) minutes as needed for chest pain.     ranitidine (ZANTAC) 75 MG tablet Take 75 mg by mouth daily as needed for heartburn.         Subjective:        Patient ID: Aury Marinelli is a 63 y.o. female.    Chief Complaint:  HPI  The following portions of the patient's history were reviewed and updated as appropriate: allergies, current medications, past family history, past medical history, past social history, past surgical history and problem list.  Mrs Boyer is a 63 years old lady who underwent a coronary artery bypass  graft x 3 with LIMA-LAd, rSVG_Diag, rSVG-OM, EVH from [facility] on 08/26/2019 at [facility] by Dr Ezequiel Clinton.  Hospital course was complicated by postop blood loss anemia for which she received blood transfusion.  She was discharged to home 08/31/2019 and presented today with her  for postop CV surgery follow up.  She stated that incisional pain has been manageable, but her chronic back pain has been the issue, and breathing is good.  Incisions are healing well, lung sounds are clear and no sternal instability is appreciated  Appetite was initially poor is getting better, but she still can't taste her food  Patient has had issues with constipation when she first went home, but it is getting better and she voids without difficulties.  She has not started outpatient cardiac rehab yet because she wanted to wait till sternum was healed, advised to call and scheduled and start as soon as possible.  She saw her PCP Taisha Canales MD 09/03/2019 and is scheduled to see her cardiologist Dr Mary Lomax MD on 10/04/2019  Patient had difficulties with Coreg 6.25 mg two times a day, and had to stop  Will try reduce dose of Coreg 3.125 mg two times a day and evaluate in a week. She is advised to check BP first thing in the morning prior to taking Coreg and then 2 hours after taking the medication.  Will also restart Plavix 75 mg daily.  Okay to start driving by 09/26/2019. Advised to take Mag-Citrate 296 ml daily as needed for constipation  Patient is a retired . Patient is otherwise doing well and will not need any further CV surgery follow up, but is advised to call with questions    Review of Systems   Constitution: Negative.   HENT: Negative.    Eyes: Negative.    Cardiovascular: Negative.    Respiratory: Negative.    Hematologic/Lymphatic: Negative.    Skin: Negative.    Musculoskeletal: Negative.    Gastrointestinal: Negative.    Genitourinary: Negative.    Neurological: Negative.     Psychiatric/Behavioral: Negative.           Objective:     Physical Exam   Constitutional: She appears healthy. No distress.   HENT:   Nose: Nose normal.   Mouth/Throat: Dentition is normal. Oropharynx is clear.   Eyes: Pupils are equal, round, and reactive to light. Conjunctivae are normal.   Neck: Normal range of motion and thyroid normal. Neck supple.   Pulmonary/Chest: Effort normal and breath sounds normal. She has no wheezes. She has no rales. She exhibits no tenderness.   Abdominal: Soft. Bowel sounds are normal.   Musculoskeletal: Normal range of motion.   Neurological: She is alert and oriented to person, place, and time. She has normal motor skills, normal reflexes and intact cranial nerves. Gait normal.   Skin: Skin is warm and dry.

## 2021-06-01 NOTE — PROGRESS NOTES
ITP ASSESSMENT   Assessment Day: Initial    Session Number: 1 of 5 sessions allowed from insurance  Precautions: s/p CABG x3: Sternal and [facility] EVH on 8/26/19, Chronic Back Pain (T3 Compressed vertebrae), Left Shoulder Arthritis, Right Carotid Stenosis, CAD and 2 NSTEMI (4/1/19 and 8/21/19)    Diagnosis: CAB;MI    Risk Stratification: High    Referring Provider: Betty Solorio PA-C   ITP: Dr. Eaton  EXERCISE  Exercise Assessment: Initial     6 Minute Walk Test   Pre   Pre Exercise HR: 85                    Pre Exercise BP: 112/70    Peak  Peak HR: 106                   Peak BP: 134/70    Peak feet: 1050    Peak O2 SAT: 98    Peak RPE: 12    Peak MPH: 1.99    Symptoms:  Peak Symptoms: no change in left pectoral/left axillarypain or between shoulder blades/upper back pain      5 mins. Post  5 Min Post HR: 81    5 Min Post BP: 102/60                         Exercise Plan  Goals Next 30 days  ADL'S: Goal #1- Increase upper extremity strength/stamina with sternal precautions and help pt build confidence to resume driving (1-2METs) and showering/toweling off (4METs). In CR mock similar MET levels of 3-4METs for pt ADL activities.     Leisure: Goal #2- Resume climbing a flight of 14 stairs without SOB (4 METs). With LTG goal to be able  to carry 15-20lbs upstairs (5--6 METs). Mock similar MET levels on stairs in CR and monitor pt for proper cv response and symptoms in CR at higher MET levels    Work: Goal #3-Resume all activities: Stairs (4METs), Carrying <15lbs upstairs (4.5-5METs)Walking 60' without cv s/s or concerns(3-4METs), walking on beach (4 METs), Leisure swimming (5-6METs), Traveling the world and carrying luggage (4-5METs)  Education Goals: All goals in this section met    Education Goals Met: Medication review.;Has system for taking medication.;Patient can state cardiac s/s and appropriate emergency response.      Exercise Prescription  Exercise Mode: (S) Treadmill;Bike;Nustep;Arm Erg.;Hallway Walking;Stairs  (s/p 6 weeks for arm use 10/7/19)    Frequency: 1-2x/week    Duration: 30-60 minutes    Intensity / THR: 20-30 beats above resting heart rate    RPE 11-14  Progression / Met level: 2.5-4+    Resistive Training?: Yes (s/p 6 weeks for arm use 10/7/19)    Current Exercise (mins/week): 200    Interventions  Home Exercise:  Mode: walking    Frequency: 2-3x/week on non rehab days    Duration: 15' walks 2x/day on non rehab days increasing to 1 session of 30 minutes    Education Material : Educational videos;Provide written material;Individual education and counseling;Offer educational classes    Education Completed  Exercise Education Completed: Cardiac Anatomy;Signs and Symptoms;Medication review;RPE;Emergency Plan;Home Exercise;Warm up/cool down;FITT Principles;BP/HR Reponse to exercise;Stretching;Strength training;Benefits of Exercise;End point of exercise              Exercise Follow-up/Discharge  Follow up/Discharge: (S) Skilled therapy needed to guide appropriate aerobic exercise s/p CABG. Monitor EKG for EKG changes-noted today early repolarization vs ST elevation. Pt denies angina symptoms at this time however continue to monitor for EKG changes. Provided education, encouragement and emtional support s/p recent NSTEMI/CAB to help pt build her confidence to resume all activities. Monitor and progress pt to LTG of 4-6METs as tolerated and change up modalities if limitations. Progress pt as tolerated. Pt may be limited with her progression due to her Insurance coverage-only covering 5 sessions with her second event this year.    NUTRITION  Nutrition Assessment: Initial      Nutrition Risk Factors:  Nutrition Risk Factors: Dyslipidemia  Cholesterol: 127 (6/20/19)  LDL: 67  HDL: 38  Triglycerides: 109    Nutrition Plan  Interventions  Diet Consult: Declined    Other Nutrition Intervention: Diet Class;Therapist/Pt Discussion;Provide with Written Material    Initial Rate Your Plate Score: 0 (Pt completed Diet Habit Survey  "in April after last CR series)    Education Completed  Nutrition Education Completed: Low Saturated fat diet;Risk factor overview;Low sodium diet;Weight management (Soluable Fiber Class. Since pt was here from 4/10-8/8/19. )    Goals  Nutrition Goals (Next 30 days): Patient demonstrated understanding of cardiac nutrition, no goals identified for the next 30 days    Goals Met  Nutrition Goals Met: Patient can identify their risk factors for CAD;Patient will maintain current weight or gradual weight gain;Patient follows a low sodium diet;Completed Nutritional Risk Screen;Provided Rate your Plate Survey;Reviewed Dietitian schedule;Patient states following a low saturated fat diet;Patient knows appropriate portion size    Height, Weight, and  BMI  Weight: 132 lb 1.6 oz (59.9 kg)  Height: 5' 1\" (1.549 m)  BMI: 24.97    Nutrition Follow-up  Follow-up/Discharge: Pt reports she follows a very strict heart healthy diet and has even made more changes since she was last here in August for CR graduation. Pt eats lean meats, alot of fruit and veggies, low salt, low saturated fats and she also eats low sugar. Pt's goal at this time is to maintain her weight ~130lbs. Pt continues to take Crestor for HLD and tolerating medication well.          Other Risk Factors  Other Risk Factor Assessment: Initial    HTN Risk Factor: Hypertension    Pre Exercise BP: 112/70  Post Exercise BP: 102/60    Hypertension Plan  Goals  HTN Goals: Patient demonstrates understanding of HTN, no goals identified for the next 30 days    Goals Met  HTN Goals Met: Follow low sodium diet;Take medication as prescribed;Exercises regularly    HTN Interventions  HTN Interventions: Diet consult;Therapist/patient discussion;Provide written material;Offer educational classes    HTN Education Completed  HTN Education Completed: Low sodium diet;Medication review;Risk factor overview    Tobacco Risk Factor: NA    Risk Factor Follow-up   Follow-up/Discharge: Pt is aware " of CRF and has been doing everything possible to reduce CRF: diet, exercise and taking medications. Pt's CRF:CRF: Family Hx, HTN, CHOL. Note that pt is only on Lisinopril for HTN control at this time. Pt is intolerant of Beta Blockers liks Metropolol and Coreg-they have been D/C.      PSYCHOSOCIAL  Psychosocial Assessment: Initial     [facility] Q of L Summary Score: 0 (Deferred due to 5 session limit with insurance)    PHQ-9 Total Score: 0 (deferred due to 5 session limit insurance)    Psychosocial Risk Factor: NA    Psychosocial Plan  Interventions  If PHQ-9 is >9, send letter to MD  Interventions: Offer educational videos and classes;Provide written material;Individual education and counseling     Education Completed  Education Completed: Relaxation/Coping Techniques;S/S of depression;Effects of stress on body    Goals  Goals (Next 30 days): Patient demonstrates understanding of stress, no goals identified for the next 30 days  Goals Met  Goals Met: Identified Support system;Oriented to stress management classes;Identify stressors;Practicing stress management skills    Psychosocial Follow-up  Follow-up/Discharge: Pt continues to manage her stressors well. She has no concerns about stressors, anxiety or depression. Pt is present with her  today and notes that she has a good support system with , friends-social group and son. Pt enjoys watching the TV-English shows for a good laugh and relaxation. She continues to be invovled with her social groups for luncheons/coffee/tea. Music and exercise have also help relief stressors as well.      Patient involved in Goal setting?: Yes

## 2021-06-02 VITALS — WEIGHT: 135 LBS | HEIGHT: 61 IN | BODY MASS INDEX: 25.49 KG/M2

## 2021-06-02 VITALS
HEIGHT: 61 IN | BODY MASS INDEX: 25.77 KG/M2 | WEIGHT: 136.5 LBS | BODY MASS INDEX: 25.77 KG/M2 | WEIGHT: 136.5 LBS | HEIGHT: 61 IN

## 2021-06-02 VITALS — WEIGHT: 136.8 LBS | BODY MASS INDEX: 25.85 KG/M2

## 2021-06-02 VITALS — BODY MASS INDEX: 25.92 KG/M2 | WEIGHT: 137.2 LBS

## 2021-06-02 VITALS — WEIGHT: 137.4 LBS | BODY MASS INDEX: 25.96 KG/M2

## 2021-06-02 VITALS — WEIGHT: 134.2 LBS | BODY MASS INDEX: 25.36 KG/M2

## 2021-06-02 NOTE — PROGRESS NOTES
ITP ASSESSMENT   Assessment Day: 30 Day-Last day    Session Number: 5 (5 sessions allowed from insurance)  Precautions: s/p CABG x3: Sternal and [facility] EVH on 8/26/19, Chronic Back Pain (T3 Compressed vertebrae), Left Shoulder Arthritis, Right Carotid Stenosis, CAD and 2 NSTEMI (4/1/19 and 8/21/19)    Diagnosis: CAB;MI    Risk Stratification: High    Referring Provider: Taisha Hinton MD   ITP sent to Dr. Eaton  EXERCISE  Exercise Assessment: Discharge                         Exercise Plan  Goals Next 30 days  ADL'S: Goal #1- Increase upper extremity strength/stamina with sternal precautions and help pt build confidence to resume driving (1-2METs) and showering/toweling off (4METs). In CR mock similar MET levels of 3-4METs for pt ADL activities.     Leisure: Goal #2- Resume climbing a flight of 14 stairs without SOB (4 METs). With LTG goal to be able  to carry 15-20lbs upstairs (5--6 METs). Mock similar MET levels on stairs in CR and monitor pt for proper cv response and symptoms in CR at higher MET levels    Work: Goal #3-Resume all activities: Stairs (4METs), Carrying <15lbs upstairs (4.5-5METs)Walking 60' without cv s/s or concerns(3-4METs), walking on beach (4 METs), Leisure swimming (5-6METs), Traveling the world and carrying luggage (4-5METs)      Education Goals: All goals in this section met    Education Goals Met: Medication review.;Has system for taking medication.;Patient can state cardiac s/s and appropriate emergency response.                          Goals Met  Initial ADL's goals met: Goal met: Goal #1- Increase upper extremity strength/stamina with sternal precautions and help pt build confidence to resume driving (1-2METs) and showering/toweling off (4METs). In CR mock similar MET levels of 3-4METs for pt ADL activities.     Initial Leisure goals met: Goal not fully met, doing stairs at home without symptoms, has not resumed carrying 15-20 lbs. upstairs. Goal #2- Resume climbing a flight of 14 stairs  without SOB (4 METs). With LTG goal to be able  to carry 15-20lbs upstairs (5--6 METs). Mock similar MET levels on stairs in CR and monitor pt for proper cv response and symptoms in CR at higher MET levels    Intial Work goals met: Goals not met: Goal #3-Resume all activities: Stairs (4METs), Carrying <15lbs upstairs (4.5-5METs)Walking 60' without cv s/s or concerns(3-4METs), walking on beach (4 METs), Leisure swimming (5-6METs), Traveling the world and carrying luggage (4-5METs)    Exercise Prescription  Exercise Mode: (S) Treadmill;Bike;Nustep;Arm Erg.;Hallway Walking;Stairs (s/p 6 weeks for arm use 10/7/19)    Frequency: 1-2x/week    Duration: 30-60 minutes    Intensity / THR: 20-30 beats above resting heart rate    RPE 11-14  Progression / Met level: 2.5-4+    Resistive Training?: Yes (s/p 6 weeks for arm use 10/7/19)      Current Exercise (mins/week): 200      Interventions  Home Exercise:  Mode: walking    Frequency: 2-3x/week on non rehab days    Duration: 30 min.      Education Material : Educational videos;Provide written material;Individual education and counseling;Offer educational classes      Education Completed  Exercise Education Completed: Cardiac Anatomy;Signs and Symptoms;Medication review;RPE;Emergency Plan;Home Exercise;Warm up/cool down;FITT Principles;BP/HR Reponse to exercise;Stretching;Strength training;Benefits of Exercise;End point of exercise              Exercise Follow-up/Discharge  Follow up/Discharge: Patient has completed 5 sessions of phase II only able to do 5 sessions per insurance, will continue with phase III monitored.  Patient plans to continue to walk for home exercise program.   NUTRITION  Nutrition Assessment: Discharge    Nutrition Risk Factors:  Nutrition Risk Factors: Dyslipidemia  Cholesterol: 127  LDL: 67  HDL: 38  Triglycerides: 109      Nutrition Plan  Interventions  Diet Consult: Declined    Other Nutrition Intervention: Diet Class;Therapist/Pt Discussion    Initial  "Rate Your Plate Score: 0      Education Completed  Nutrition Education Completed: Low Saturated fat diet;Risk factor overview;Low sodium diet;Weight management      Goals  Nutrition Goals (Next 30 days): Patient demonstrated understanding of cardiac nutrition, no goals identified for the next 30 days      Goals Met  Nutrition Goals Met: Patient can identify their risk factors for CAD;Patient will maintain current weight or gradual weight gain;Patient follows a low sodium diet;Completed Nutritional Risk Screen;Provided Rate your Plate Survey;Reviewed Dietitian schedule;Patient states following a low saturated fat diet;Patient knows appropriate portion size      Height, Weight, and  BMI  Weight: 132 lb 4.8 oz (60 kg)  Height: 5' 1\" (1.549 m)  BMI: 25.01      Nutrition Follow-up  Follow-up/Discharge: Pt reports she continues to follows a very strict heart healthy diet and has even made more changes since she was last here in August for CR graduation. Pt eats lean meats, alot of fruit and veggies, low salt, low saturated fats and she also eats low sugar. Pt's goal at this time is to maintain her weight ~130lbs. Pt continues to take Crestor for HLD and tolerating medication well.        Other Risk Factors  Other Risk Factor Assessment: Discharge      HTN Risk Factor: Hypertension      Pre Exercise BP: 112/60  Post Exercise BP: 110/58      Hypertension Plan  Goals  HTN Goals: Patient demonstrates understanding of HTN, no goals identified for the next 30 days      Goals Met  HTN Goals Met: Follow low sodium diet;Take medication as prescribed;Exercises regularly      HTN Interventions  HTN Interventions: Diet consult;Therapist/patient discussion;Provide written material;Offer educational classes      HTN Education Completed  HTN Education Completed: Low sodium diet;Medication review;Risk factor overview      Tobacco Risk Factor: NA        Risk Factor Follow-up   Follow-up/Discharge: Pt is aware of CRF and has been doing " everything possible to reduce CRF: diet, exercise and taking medications. Pt's CRF:CRF: Family Hx, HTN, CHOL. Note that pt is only on Lisinopril for HTN control at this time. Pt is intolerant of Beta Blockers liks Metropolol and Coreg-they have been D/C.          PSYCHOSOCIAL  Psychosocial Assessment: Discharge       [facility] Q of L Summary Score: 0      PHQ-9 Total Score: 0      Psychosocial Risk Factor: NA      Psychosocial Plan  Interventions  Interventions: Offer educational videos and classes;Provide written material;Individual education and counseling      Education Completed  Education Completed: Relaxation/Coping Techniques;S/S of depression;Effects of stress on body      Goals  Goals (Next 30 days): Patient demonstrates understanding of stress, no goals identified for the next 30 days      Goals Met  Goals Met: Identified Support system;Oriented to stress management classes;Identify stressors;Practicing stress management skills      Psychosocial Follow-up  Follow-up/Discharge: Pt again continues to manage her stressors well. She has no concerns about stressors, anxiety or depression. Pt is present with her  today and notes that she has a good support system with , friends-social group and son. Pt enjoys watching the TV-English shows for a good laugh and relaxation. She continues to be invovled with her social groups for luncheons/coffee/tea. Music and exercise have also help relief stressors as well.          Patient involved in Goal setting?: Yes      Signature: _____________________________________________________________    Date: __________________    Time: __________________

## 2021-06-02 NOTE — PROGRESS NOTES
"Aury Marinelli has participated in 3 sessions of Phase II Cardiac Rehab.    Progress Report:   Cardiac Rehab Treatment Progress Report 9/25/2019 9/30/2019 10/4/2019   Weight 132 lbs 2 oz 131 lbs 8 oz 132 lbs 3 oz   Pre Exercise  HR 85 83 83   Pre Exercise /70 108/62 110/62   Pre Blood Sugar (mg/dl) - - -   Treadmill Peak HR - 92 93   Treadmill Peak Blood Pressure - 122/64 128/64   Nustep Peak Heart Rate - - -   Heart Rate 81 80 85   Post Exercise /60 112/60 104/68   ECG SR with early repolarization vs ST elevation 0.5-0.75mm without angina discomfort. SR with slight ST elevation in lead III SR with slight ST elevation in lead III   Total Exercise Minutes 6 35 40         Current Status:  Therapists Comments: Patient tolerates treadmill well, did report that she felt like her \"heart was pounding' when on bike, no ectopy on monitor. HR and BP stable.    If Physician recommends change in treatment plan, please place orders.        __________________________________________________      _____________  Signature                                                                                                  Date  "

## 2021-06-02 NOTE — PROGRESS NOTES
Cardiac Rehab  Phase III-Pt transferred from Phase II to Phase III CR due to insurance coverage    Diagnosis: NSTEMI, CABG x3                       Date of Onset: 8/21/19, 8/26/19  Procedure: Angio-in stent restenosis (8/22/19), CAB(8/26/19)  ICD/Pacemaker: No Parameters: N/A  Post-op Complications: anemia-blood loss. Needed Bl transfusion  ECG History: SR, SB, SR/1'AVB        EF%:69 TTE 8/22/19  Past Medical History:        NSTEMIx 2 (4/1/19 and 8/21/19-due to in-stent restenosis)         Patient Active Problem List   Diagnosis     Premature Menopause     Eczema     Osteoarthritis     Hypercholesterolemia     Anemia     Irritable Bowel Syndrome     Female Infertility     NSTEMI (non-ST elevated myocardial infarction) (H)     ACS (acute coronary syndrome) (H)     Essential hypertension     Coronary artery disease due to lipid rich plaque     Chest pain, unspecified     Non-traumatic compression fracture of T3 thoracic vertebra with routine healing, subsequent encounter     Unstable angina (H)     Carotid stenosis     S/P CABG x 3     Slow transit constipation           Past Medical History:   Diagnosis Date     Breast injury       hit cabinet on left axillary side     Coronary artery disease       Fibrocystic breast       GERD (gastroesophageal reflux disease)       Hyperlipidemia       Hypertension       IBS (irritable bowel syndrome)              Past Surgical History:   Procedure Laterality Date     CV CORONARY ANGIOGRAM N/A 4/1/2019     Procedure: Coronary Angiogram;  Surgeon: Nilo Voss MD;  Location: [facility];  Service: Cardiology     CV CORONARY ANGIOGRAM N/A 4/2/2019     Procedure: Coronary Angiogram;  Surgeon: Rick Chilel MD;  Location: [facility];  Service: Cardiology     CV CORONARY ANGIOGRAM N/A 8/22/2019     Procedure: Coronary Angiogram;  Surgeon: Rick Chilel MD;  Location: [facility];  Service: Cardiology     IR PLEURAL DRAINAGE W  CATHETER INSERTION   8/27/2019     DC LAP,UTERUS,UNLISTED PROCEDURE         Description: Laparoscopy Of Uterus;  Recorded: 01/13/2009;      Social History               Socioeconomic History     Marital status:        Spouse name: Not on file     Number of children: Not on file     Years of education: Not on file     Highest education level: Not on file   Occupational History     Not on file   Social Needs     Financial resource strain: Not on file     Food insecurity:       Worry: Not on file       Inability: Not on file     Transportation needs:       Medical: Not on file       Non-medical: Not on file   Tobacco Use     Smoking status: Never Smoker     Smokeless tobacco: Never Used   Substance and Sexual Activity     Alcohol use: No       Frequency: Never     Drug use: No     Sexual activity: Not on file   Lifestyle     Physical activity:       Days per week: Not on file       Minutes per session: Not on file     Stress: Not on file   Relationships     Social connections:       Talks on phone: Not on file       Gets together: Not on file       Attends Pentecostal service: Not on file       Active member of club or organization: Not on file       Attends meetings of clubs or organizations: Not on file       Relationship status: Not on file     Intimate partner violence:       Fear of current or ex partner: Not on file       Emotionally abused: Not on file       Physically abused: Not on file       Forced sexual activity: Not on file   Other Topics Concern     Not on file   Social History Narrative     Not on file               Family History   Problem Relation Age of Onset     Breast cancer Maternal Grandmother        Physical Assessment  Precautions/ Physical Limitations: T3 Fracture ( no weights or ex bands and only use Arm Erg #1),Surgical, Chronic Back Pain, NSTEMI x2 within 4 months, CAD, Carotid Stenosis  Incisions:  Healing very well-no drainage  Psychosocial/ Emotional Health  1. In the past 12 months,  have you been in a relationship where you have been abused physically, emotionally, sexually or financially? No    notified: NA  2. Who do you turn to for emotional support?: , Family  3. Do you have cultural or spiritual needs? No  4. Have there been any major life changes in the past 12 months? Yes 2 heart attacks and CABx3     Referral Information  Primary Physician: Taisha Hinton MD  Cardiologist: Dr. Nolan  Surgeon: Dr. Clinton     Home exercise/Equipment: No home ex equipment. Gym Membership at [employer] in [place]     Patient's long-term goal(s): Resume all activities: walking, swimming, traveling to [facility] , Winter months in [facility]-walking on the beach, carry luggage, gardening, household duties, ADL's and drinking coffee.     1. Living Accommodations: Home      Steps: Yes      Support people at home:    2. Marital Status:   3. Family is able to assist with cares      Taoist/Community involvement: group of friends get together's for coffee/tea  4. Recreation/Hobbies: (see above LTG) traveling, watch TV, enjoy [employer]       Current Activity Level  Mobility status: None  Self Cares/ ADL's: Independent  Home management: House cleaning-scrubbing floors (5.5METs), gardening  Driving: Independent  Sleeping Pattern: fair   Appetite: good     Pain  Location: incisional tube sites post op CAB  Characteristics:ache  Intensity: (0-10 scale) 3  Current Pain Management: Tylenol   Intervention: Tylenol  Response: helps    Cardiac Rehab  Phase III Treatment Plan    Risk Factors  High Cholesterol & Hypertension & Family History     Exercise Prescription  THR: 20-30 BPM, Karvonen 65-80%: 129-140bpm and/or RPE: 4-7  Frequency: 2x/week in CR and 2-3x/week for HEP  Duration: 30-60minutes      Phase 3 Goal  #1: Increase UE strength and stamina to get back to scrubbing floors and swimming at higher METs.  #2: Patient will progress from 3.6-6 met level for 30-60 minutes of  continuous/interval exercise.  #3: Maintain weight at ~130lbs     Phase 3 Plan  #1: Attend CR 2x/week and progress as tolerated using arms on a variety of modalities: Nustep, Arm Erg #1-declined weights/bands  #2:Change up modalities and increase levels safely as tolerated to a 6 MET level goal.  #3:Continue to encourage heart healthy diet with exercise to help maintain weight.

## 2021-06-02 NOTE — TELEPHONE ENCOUNTER
LVM stating it is ok for pt to fly/travel and there are no surgical restrictions on this for her past 10/26.

## 2021-06-03 VITALS — WEIGHT: 135 LBS | BODY MASS INDEX: 25.49 KG/M2 | BODY MASS INDEX: 25.7 KG/M2 | WEIGHT: 136 LBS | HEIGHT: 61 IN

## 2021-06-03 VITALS — WEIGHT: 131.5 LBS | BODY MASS INDEX: 24.85 KG/M2

## 2021-06-03 VITALS — BODY MASS INDEX: 25.73 KG/M2 | WEIGHT: 136.2 LBS

## 2021-06-03 VITALS — WEIGHT: 136.6 LBS | BODY MASS INDEX: 25.81 KG/M2

## 2021-06-03 VITALS — WEIGHT: 136 LBS | BODY MASS INDEX: 25.7 KG/M2

## 2021-06-03 VITALS — BODY MASS INDEX: 25.85 KG/M2 | WEIGHT: 136.8 LBS

## 2021-06-03 VITALS
SYSTOLIC BLOOD PRESSURE: 104 MMHG | BODY MASS INDEX: 24.52 KG/M2 | WEIGHT: 129.9 LBS | HEART RATE: 92 BPM | HEIGHT: 61 IN | RESPIRATION RATE: 16 BRPM | TEMPERATURE: 98.3 F | DIASTOLIC BLOOD PRESSURE: 62 MMHG

## 2021-06-03 VITALS — WEIGHT: 132.3 LBS | BODY MASS INDEX: 25 KG/M2

## 2021-06-03 VITALS — WEIGHT: 135.4 LBS | BODY MASS INDEX: 25.58 KG/M2

## 2021-06-03 VITALS — BODY MASS INDEX: 25.79 KG/M2 | WEIGHT: 136.5 LBS

## 2021-06-03 VITALS
RESPIRATION RATE: 16 BRPM | HEART RATE: 84 BPM | DIASTOLIC BLOOD PRESSURE: 70 MMHG | HEIGHT: 61 IN | SYSTOLIC BLOOD PRESSURE: 122 MMHG | BODY MASS INDEX: 24.92 KG/M2 | WEIGHT: 132 LBS

## 2021-06-03 VITALS — WEIGHT: 137 LBS | BODY MASS INDEX: 25.89 KG/M2

## 2021-06-03 VITALS — WEIGHT: 136.1 LBS | BODY MASS INDEX: 25.72 KG/M2

## 2021-06-03 VITALS — BODY MASS INDEX: 25.75 KG/M2 | WEIGHT: 136.3 LBS

## 2021-06-03 VITALS — BODY MASS INDEX: 25.81 KG/M2 | WEIGHT: 136.6 LBS

## 2021-06-03 VITALS — BODY MASS INDEX: 25.92 KG/M2 | WEIGHT: 137.2 LBS

## 2021-06-03 VITALS
BODY MASS INDEX: 25.22 KG/M2 | HEIGHT: 61 IN | WEIGHT: 133.6 LBS | BODY MASS INDEX: 25.22 KG/M2 | HEIGHT: 61 IN | WEIGHT: 133.6 LBS

## 2021-06-03 VITALS — WEIGHT: 135.8 LBS | BODY MASS INDEX: 25.66 KG/M2

## 2021-06-03 VITALS — WEIGHT: 135 LBS | BODY MASS INDEX: 25.51 KG/M2

## 2021-06-03 VITALS — WEIGHT: 135.6 LBS | BODY MASS INDEX: 25.62 KG/M2

## 2021-06-03 VITALS — WEIGHT: 137.5 LBS | BODY MASS INDEX: 25.98 KG/M2

## 2021-06-03 VITALS — BODY MASS INDEX: 26.07 KG/M2 | WEIGHT: 138 LBS

## 2021-06-03 VITALS — BODY MASS INDEX: 25.86 KG/M2 | HEIGHT: 61 IN | WEIGHT: 137 LBS

## 2021-06-03 VITALS — BODY MASS INDEX: 24.98 KG/M2 | WEIGHT: 132.2 LBS

## 2021-06-03 VITALS — BODY MASS INDEX: 25.53 KG/M2 | WEIGHT: 135.1 LBS

## 2021-06-03 VITALS — WEIGHT: 132 LBS | BODY MASS INDEX: 24.94 KG/M2

## 2021-06-03 VITALS — BODY MASS INDEX: 26 KG/M2 | WEIGHT: 137.6 LBS

## 2021-06-03 VITALS — WEIGHT: 136.8 LBS | BODY MASS INDEX: 25.85 KG/M2

## 2021-06-03 VITALS — BODY MASS INDEX: 25.83 KG/M2 | WEIGHT: 136.7 LBS

## 2021-06-03 VITALS — BODY MASS INDEX: 24.96 KG/M2 | WEIGHT: 132.1 LBS

## 2021-06-04 VITALS
RESPIRATION RATE: 16 BRPM | WEIGHT: 130.6 LBS | SYSTOLIC BLOOD PRESSURE: 102 MMHG | HEART RATE: 68 BPM | DIASTOLIC BLOOD PRESSURE: 60 MMHG | BODY MASS INDEX: 24.66 KG/M2 | HEIGHT: 61 IN

## 2021-06-04 VITALS
BODY MASS INDEX: 25.32 KG/M2 | WEIGHT: 134 LBS | HEART RATE: 68 BPM | DIASTOLIC BLOOD PRESSURE: 68 MMHG | SYSTOLIC BLOOD PRESSURE: 125 MMHG

## 2021-06-04 VITALS
RESPIRATION RATE: 12 BRPM | BODY MASS INDEX: 24.8 KG/M2 | SYSTOLIC BLOOD PRESSURE: 118 MMHG | HEIGHT: 61 IN | DIASTOLIC BLOOD PRESSURE: 80 MMHG | HEART RATE: 72 BPM | OXYGEN SATURATION: 100 % | WEIGHT: 131.38 LBS

## 2021-06-04 VITALS
DIASTOLIC BLOOD PRESSURE: 73 MMHG | HEART RATE: 67 BPM | BODY MASS INDEX: 24.94 KG/M2 | WEIGHT: 132 LBS | SYSTOLIC BLOOD PRESSURE: 118 MMHG

## 2021-06-04 VITALS
SYSTOLIC BLOOD PRESSURE: 134 MMHG | HEIGHT: 61 IN | HEART RATE: 70 BPM | WEIGHT: 133.5 LBS | BODY MASS INDEX: 25.2 KG/M2 | DIASTOLIC BLOOD PRESSURE: 70 MMHG

## 2021-06-04 VITALS — BODY MASS INDEX: 25.51 KG/M2 | HEIGHT: 61 IN | WEIGHT: 135.1 LBS

## 2021-06-07 NOTE — PROGRESS NOTES
MTM Transition of Care Encounter  Assessment & Plan                                                     Post Discharge Medication Reconciliation Status: discharge medications reconciled, continue medications without change    1. Coronary artery disease   Recently hospitalized s/p NSTEMI, managed medically. Blood pressure is well controlled and meeting goal of <140/90 mm Hg per JNC-8 hypertension guidelines. Now on diltiazem, unable to tolerate beta blockers. Some concerns with side effects and dosage. Will relay her concerns to cardiology team as it also appears she still needs to schedule a follow up with them.     2. Pure hypercholesterolemia  Well controlled and meeting LDL goal less than 70 mg/dl per ACC/AHA guidelines on moderate intensity statin, rosuvastatin 10 mg daily.  Most recent LDL of 55 mg/dl     Follow Up  PRN with MTM    Subjective & Objective                                                       Aury Marinelli is a 64 y.o. female called for a transitions of care visit. she was discharged from [facility] on 4/25/20 for NSTEMI.    Patient consented to a telehealth visit: Yes  Chief Complaint: Hospital discharge medication review  Medication Adherence/Access: Good; no issues identified.     Per hospitalization notes:  Ms. Aury Marinelli is a 64 y.o. female with acute non-ST elevated myocardial infarction who presented with unstable anginal symptoms found to have no good targets for revascularization.  She had patent LIMA to LAD and graft on which was open.  Graft to diagonal branch was occluded but a small vessel and unable to revascularize.  She was started on calcium channel blocker along with nitrates overnight with no recurrence of her chest pain.  She ambulated the hallway with no difficulties in her breathing.  Currently her blood pressure is controlled.  Heart rates are in the 50s.  She did have on telemetry Mobitz type I AV block.    She was started on diltiazem  mg daily and  "isosorbide mononitrate 30 mg daily.  Her low-dose lisinopril was discontinued. She continues to take aspirin 81 mg and clopidogrel 75 mg daily.  For cholesterol management she is on rosuvastatin 10 mg every evening.  She has a past intolerance to beta-blockers which cause extreme fatigue and flulike symptoms.    She says initially she did experience a headache from the isosorbide, but that has resolved.  She verifies stopping lisinopril.  No bleeding concerns on dual antiplatelet therapy.  She does wonder if she needs a lower dose of diltiazem.  She feels that her heart is pounding and skips a beat at times.  She thinks the medication is \"over regulating\" her heart.  Seems to happen more often when she exercises.  She is checking her blood pressure and pulse regularly.  Reports blood pressures are typically 120s over 60s and pulse in the 60s.  Other than the symptoms described above she feels she is doing quite well.  She does have nitroglycerin available to use as needed but has not used this recently.      PMH: reviewed in EPIC   Allergies/ADRs: reviewed in EPIC   Alcohol: no  Tobacco:   Social History     Tobacco Use   Smoking Status Never Smoker   Smokeless Tobacco Never Used     Recent Vitals:   BP Readings from Last 3 Encounters:   04/25/20 117/53   04/22/20 153/71   12/30/19 102/60      Wt Readings from Last 3 Encounters:   04/25/20 135 lb 1.6 oz (61.3 kg)   04/22/20 130 lb (59 kg)   12/30/19 130 lb 9.6 oz (59.2 kg)     Pulse Readings from Last 3 Encounters:   04/25/20 65   04/22/20 84   12/30/19 68     ----------------    The patient was given a summary of these recommendations via [facility]    I spent 30 minutes with this patient today;  . I offer these suggestions with the understanding that I don't fully understand Aury's past medical history and the complexity of her health conditions.  Aury should make no changes without the approval of his primary care provider.. A copy of the visit note was provided " to the patient's provider.     Medina Xiong, PharmD, BCACP  Medication Management (MTM) Pharmacist  [facility]     Telemedicine Visit Details    Type of service:  Telephone     Start Time: 11:00 AM  End Time (time video/phone call stopped): 11:23 AM    Originating Location (pt. Location): Home    Distant Location (provider location):  [facility] MEDICATION THERAPY MANAGEMENT [facility]    Mode of Communication:   Telephone     Current Outpatient Medications   Medication Sig Dispense Refill     acetaminophen (TYLENOL) 500 MG tablet Take 500-1,000 mg by mouth every 6 (six) hours as needed.       ARNICA TOP Apply topically daily as needed.       aspirin 81 MG EC tablet Take 1 tablet (81 mg total) by mouth daily.  0     calcium phosphate trib/vit D3 (CITRACAL-D3 GUMMIES ORAL) Take 1 tablet by mouth every other day. Chewable tablets. Alternates with multivitamin product.       clopidogreL (PLAVIX) 75 mg tablet Take 1 tablet (75 mg total) by mouth daily. 90 tablet 3     diclofenac sodium (VOLTAREN) 1 % Gel Apply to back over the most tender area 4 times day as needed 1 Tube 0     diltiazem (CARDIZEM CD) 240 MG 24 hr capsule Take 1 capsule (240 mg total) by mouth daily. 30 capsule 1     isosorbide mononitrate (IMDUR) 30 MG 24 hr tablet Take 1 tablet (30 mg total) by mouth daily. 30 tablet 1     lidocaine 4 % patch Place 1 patch on the skin daily as needed for pain. Remove and discard patch with 12 hours or as directed by MD.       metoclopramide (REGLAN) 10 MG tablet Take 10 mg by mouth every 6 (six) hours as needed.       multivitamin Chew Chew 1 tablet every other day. Gummy product. Alternates with calcium-vitamin D product.       nitroglycerin (NITROSTAT) 0.4 MG SL tablet Place 1 tablet (0.4 mg total) under the tongue every 5 (five) minutes as needed for chest pain. 2 Bottle 2     rosuvastatin (CRESTOR) 10 MG tablet Take 1 tablet (10 mg total) by mouth at bedtime. 30  tablet 1     No current facility-administered medications for this visit.

## 2021-06-07 NOTE — TELEPHONE ENCOUNTER
----- Message -----  From: Nilo Voss MD  Sent: 4/28/2020   1:41 PM CDT  To: Aleena Araiza RN    Not sure if diltiazem cuasing these symptoms but can cut to 120 daily and see if better tolerated      Called patient. Reviewed response and recommendations per Dr. Voss. Patient verbalized understanding and agreement with plan.  New Rx sent to [facility] per patient request.  Patient also requested to schedule hospital follow-up with Dr. Nolan her primary cardiologist.  Warm transferred to scheduling to arrange. -nataliia

## 2021-06-07 NOTE — PROGRESS NOTES
Review of Systems - History obtained from the patient  General ROS: negative  Psychological ROS: negative  Ophthalmic ROS: negative  ENT ROS: positive for - occasional nosebleed from the Plavix   Allergy and Immunology ROS: negative  Hematological and Lymphatic ROS: positive for - bruising  Endocrine ROS: negative  Respiratory ROS: negative   Cardiovascular ROS: positive for - palpitations  Gastrointestinal ROS: negative  Genito-Urinary ROS: negative  Musculoskeletal ROS: negative  Neurological ROS: negative  Dermatological ROS: negative   Per patient C/o being tried and having to lay down. Would like to talk about the Isosorbide. Please advise

## 2021-06-07 NOTE — TELEPHONE ENCOUNTER
"Patient has concerns regarding dose of newly prescribed diltiazem. Recently hospitalized for NSTEMI. Of note, cannot tolerate beta blockers. Specifically, she wonders if she needs a lower dose of diltiazem.  She feels that her heart is pounding and skips a beat at times.  She thinks the medication is \"over regulating\" her heart.  Seems to happen more often when she exercises.  She is checking her blood pressure and pulse regularly.  Reports blood pressures are typically 120s/60s and pulse in the 60s.     She did send a [employer] message to Dr. Voss, but has not heard back.     She is also in need of a follow up appointment with the cardiology clinic. No appointment was scheduled at discharge.   "

## 2021-06-08 NOTE — TELEPHONE ENCOUNTER
Message rec'd 5-11-20 @ 2159:        I sent a message back to her regarding this. She is unclear if it's the diltiazem or the isordil so I asked that she hold isordil so we can have a better idea of which medication is the issue for her.   Mary Nolan MD

## 2021-06-08 NOTE — TELEPHONE ENCOUNTER
Message forwarded to . Also sent patient message through [employer] with instructions to call  to arrange f/u appt.  mg

## 2021-06-08 NOTE — TELEPHONE ENCOUNTER
Per Dr. Nolan's 5-4-20 visit note:  Plan:  1.  continue cardiac diet, regular exercise  2.  Continue aspirin, Plavix  3. Continue lower dose diltiazem  4. Change imdur 30 to isordil 5 mg three times daily to see if lower dose helps with her symptoms  5. Follow up 2 months

## 2021-06-08 NOTE — TELEPHONE ENCOUNTER
----- Message -----  From: Nilo Voss MD  Sent: 6/12/2020   1:32 PM CDT  To: Aleena Araiza RN    Events noted  ptk        Response noted. Called and updated patient. -yemib

## 2021-06-08 NOTE — TELEPHONE ENCOUNTER
PC to patient. Review of recommendations from PTK. Stop Imdur. Resume Lisinopril 2.5 mg twice daily. Rx sent to pharmacy on file. Encouraged pt to give it 2 weeks to monitor and call if continued concerns. Pt verbalized understanding and agrees. FIDELIA,Rn

## 2021-06-08 NOTE — TELEPHONE ENCOUNTER
"Please note patient has also been sending [employer] messages to Dr. Voss who provided the following recommendations 5-8-20 @ 1134:  \"If the imdur is working for you then can discontinue diltiazem for now.  Carotid artery lesions would not cause neck pain.  In themselves they do not cause symptoms in the neck but can cause symptoms from artery obstruction such as transient neurologic changes such as vision  speech or muscle weakness.  If imdur prevents any chest symptom recurrence can stay on this alone for therapy.     Nilo Voss MD\"    Should patient arrange a f/u virtual visit with one of you to sort this out?  mg  "

## 2021-06-08 NOTE — TELEPHONE ENCOUNTER
See telephone encounter dated 6/9/2020. Pt wonders if she can try isosorbide with food to help with nausea. [employer]rom[employer] states with or without food is fine. She is wanting to try this medication again before starting lisinopril as advised yesterday. She was very anxious. Referred patient to discuss with her pharmacist as they can be the best resources for this. She will do so. -[facility]

## 2021-06-08 NOTE — TELEPHONE ENCOUNTER
----- Message from Mray Nolan MD sent at 5/28/2020  3:20 PM CDT -----  Regarding: RE: f/u  She got an appt with Dr Voss for this Tuesday.  I went over her meds with her however since she is seeing him Tuesday will hold off on any further changes at this point.     ----- Message -----  From: Bushra Sánchez RN  Sent: 5/27/2020   2:53 PM CDT  To: Mary Nolan MD  Subject: f/u                                              Looks like first available appt with you is not until wk of Eve 15 - is that acceptable?  mg  ----- Message -----  From: Sri Morales  Sent: 5/27/2020   2:39 PM CDT  To: Bushra Sánchez RN  Subject: RE: CLL pt                                       Dr. Nolan does not have any availability until June 19th and Aury said she cannot wait that long.  How should I proceed w/ this?  ----- Message -----  From: Bushra Sánchez RN  Sent: 5/27/2020   2:22 PM CDT  To: [facility] Scheduling Registration Pool  Subject: CLL pt                                           Per CLL - Can you schedule her for a virtual visit so we can go over her meds / side effects.  Thanks  mg

## 2021-06-08 NOTE — TELEPHONE ENCOUNTER
----- Message from Haleigh Esposito sent at 6/9/2020  8:05 AM CDT -----  Regarding: PTK pt  Caller: Aury    Primary cardiologist: Dr. Voss    Detailed reason for call: Went to emergency dept last night, please call back.     Best phone number: [phone]     Best time to contact: Today    Ok to leave a detailed message? Yes    Device? No    Additional Info: Medication related questions

## 2021-06-08 NOTE — TELEPHONE ENCOUNTER
===View-only below this line===  ----- Message -----  From: Nilo Voss MD  Sent: 6/9/2020   4:31 PM CDT  To: Gregg Garibay RN    Ok to stop imdur  Can go with 2.5 two times a day but is long acting so could take 5 daily but if she feels better with two times a day ok as well

## 2021-06-08 NOTE — TELEPHONE ENCOUNTER
Message rec'd 5-12-20 @ 2127:        I received another message from her.  She said she stopped dilt, resumed imdur.  Updated BPs were sent to me and look fine.   Thanks  Mary Nolan MD

## 2021-06-08 NOTE — TELEPHONE ENCOUNTER
Patient calling today with more questions r/t meds and symptoms. She admits she stopped her nitrate yesterday and this morning took Lisinopril 5 mg and plans to continue taking increased dose of Lisinopril-- 5 mg two times a day vs. 2.5 two times a day.    Patient had some concerns about bruising that was under her left breast after angio in April, bruising has resolved. She now has tenderness in this area and in her back, present for the last 5 days. Patient reports that the pain is worse with palpation, does NOT worsen with exertion and is non-radiating, reports no other associated symptoms. The pain is improved with arnica cream and acetaminophen. Advised patient that this does not sound like cardiac pain. The fact that pain is worse with palpation, and improves with OTC pain relievers lends to musculoskeletal pain. Reviewed concerning symptoms with patient that would require more urgent evaluation. Patient will continue to treat and monitor symptoms as she has been. -yemib

## 2021-06-08 NOTE — TELEPHONE ENCOUNTER
PC to patient. Pt has concerns as she presented to ER yesterday. All of the work up came negative. She returned home. She reports that the nitrate, long acting Imdur appears to be really upsetting her stomach, and makes her feel really bad, and upsets her stomach. She wonders if the small artery is not working, is the medication necessary. She is asking Dr. Voss if she can stop the Imdur, and go back to being on Lisinopril 2.5 two times a day or a stronger dose? This is her ask today.     At time of call patient denies any chest pain or symptoms other than her stomach being upset. Patient asked writer if she can make the change, writer informed patient that the direction needs to come from the provider, writer can't make those recommendations. Informed the patient that PTK is out of the clinic this week as the hospital rounder, and response may not come today, but will call once provider responds. Pt verbalized understanding. Reassured patient that writer will call as soon as response received. Discussion with patient of new medications from ER presentation last night of Pepcid and Ativan. Pt reports that she hasn't picked those up form the pharmacy yet. Encouraged to do so, to take the Pepcid to see if helps her stomach issues. Pt verbalized understanding.     Dr. Voss can the patient stop the Imdur, and go back to being on Lisinopril 2.5 two times a day or a stronger dose? Alternate recommendations? FIDELIA,RN

## 2021-06-08 NOTE — TELEPHONE ENCOUNTER
----- Message from Eric Grayson sent at 5/7/2020  3:40 PM CDT -----  Regarding: CLL PT / UPDATE ON CALL EARLIER TODAY  General phone call:    Caller: Aruy Matute    Primary cardiologist: SHERRI     Detailed reason for call: Pt states she called earlier today letting CLL know that she's having difficulty taking her diltiazem (CARDIZEM CD) 120 MG medication. It makes her nauseous and very sick and pt does not want to take it anymore. Pt is asking for a call back today to discuss this.     New or active symptoms? Yes     Best phone number: [phone]    Best time to contact: Today     Ok to leave a detailed message? Yes     Device? No     Additional Info:

## 2021-06-08 NOTE — TELEPHONE ENCOUNTER
"----- Message from Haleigh Esposito sent at 5/7/2020  8:04 AM CDT -----  Regarding: Rx questions  Caller: Aury    Primary cardiologist: Dr. Nolan    Detailed reason for call: Having some reactions due to medications and asking for a call back to \"sort it out\" with RN.     Best phone number: [phone]    Best time to contact: Today    Ok to leave a detailed message? Yes    Device? No    Additional Info: Pt has also sent Mychart msg to Dr. Nolan.    "

## 2021-06-08 NOTE — TELEPHONE ENCOUNTER
Return call to patient who stated she has been sending [employer] messages to Dr. Nolan since 5-5-20 and did receive response yesterday indicating that she could stop Isordil but patient thinks the Diltiazem is causing her to feel so bad.      Patient confirmed she started Diltiazem during recent hospitalization and has been feeling ill on and off since - patient took her last dose of Isordil yesterday am and did take her usual dose of Diltiazem this am.    Reassured patient that her message she sent to Dr. Nolan this am is viewable in chart and that request for call back would be forwarded to Dr. Nolan - understanding verbalized.    Please call patient back to address patient's questions/concerns sent via [employer] message - Neli cespedes

## 2021-06-08 NOTE — PROGRESS NOTES
ROS:  Pain on the left lower chest and arm/shoulder, nausea, daytime sleepiness, anxiety, easy bruising.  All other ROS is WNL.

## 2021-06-08 NOTE — TELEPHONE ENCOUNTER
"Dr. Voss,     Review notes from phone call below. Patient had not yet stopped Imdur, she was advised again to do so. She would like to increase her Lisinopril to 5 mg two times a day. Is this okay? Patient denied acute cardiac symptoms. She stated \"I know well what symptoms I need to have evaluated emergently\". Patient scheduled to see you next week on 6/17. -ejb  -------------------------------------------------------------------------------------------------------------  Phone call to patient. Had long conversation about patient's symptoms. She is still complaining of GI symptoms which she is convinced is related to use of Imdur. Reviewed with patient that she was given okay on 6/9 to discontinue this medication and start Lisinopril 2.5 mg two times a day. She states that she has not d/c'd Imdur because she wanted to be sure that Lisinopril had enough time to build up in her body. Advised patient that that if she was convinced Imdur was the cause of her symptoms, she should discontinue. She verbalized understanding but would like to increase her Lisinopril 5 mg two times a day.     Patient also had questions about completing a CCTA as there was some discussion about this when she was hospitalized back in April. Reminded patient that she had direct coronary angiography which is the gold standard for defining her coronary anatomy. Per Dr. Voss patient had a small artery that was not amenable to PCI, this it was determined to treat with medications. She uses SL NTG as needed.    Patient admits that she feels shaky, not physically but \"on the inside\". Noted that recent ED note from 6/8/20 states that patient's symptoms completely resolved with Ativan and Pepcid. Encouraged patient to use the two medications she was discharged with if she needed them.     ----- Message from Louann Butler sent at 6/11/2020  8:06 AM CDT -----  General phone call:    Caller: Patient  Primary cardiologist: Dr. Voss  Detailed reason " for call: Patient would like to talk to Dr. Voss about her medications and CCTA scan.   She also would like to talk with Dr. Voss about having this scan, since she feels it is time to do one again.    New or active symptoms?   Patient is not feeling well.    Best phone number: [phone]  Best time to contact: Anytime  Ok to leave a detailed message? yes  Device? no    Additional Info:

## 2021-06-08 NOTE — TELEPHONE ENCOUNTER
Message rec'd 5-27-20 @ 1419:        Can you schedule her for a virtual visit so we can go over her meds / side effects  Mary Nolan MD

## 2021-06-09 NOTE — TELEPHONE ENCOUNTER
----- Message -----  From: Aleena Araiza RN  Sent: 6/29/2020  10:08 AM CDT  To: Nilo Voss MD    ----- Message from Aleena Araiza RN sent at 6/29/2020 10:08 AM CDT -----  See also MyChart message from patient and questions about Lisinopril. -nataliia    ----- Message -----  From: Nilo Voss MD  Sent: 6/29/2020   3:02 PM CDT  To: Aleena Araiza RN    Agree refer to PCP    Follow-up phone call to patient. Reviewed [facility] response to questions was to discuss with PCP. Patient verbalized understanding. -nataliia

## 2021-06-09 NOTE — TELEPHONE ENCOUNTER
----- Message from Rajeshwang Grayson sent at 6/26/2020 11:12 AM CDT -----  Regarding: [facility] PT - MEDICATIONS  General phone call:    Caller: Aury Matute    Primary cardiologist: PTK/SHERRI     Detailed reason for call: Pt is requesting for a call back to discuss her medications. She doesn't think they are working for her.     Best phone number: [phone]    Best time to contact: Anytime     Ok to leave a detailed message? Yes     Device? No     Additional Info:

## 2021-06-09 NOTE — TELEPHONE ENCOUNTER
"Patient calling today to discuss medications she can take for an \"upset stomach\". She reports that she has had \"gurgling\" for a couple of days and Pepcid does not seem to help. Reviewed with patient that she would best benefit from contacting PCP to discuss. She notes that she has an in-person visit scheduled today to have breast pain evaluated and will discuss GI symptoms and medications with PCP. -ejb  "

## 2021-06-09 NOTE — TELEPHONE ENCOUNTER
Call back placed to patient. Advised that CT contrast dye is the same dye that is used in coronary angiogram and will not negatively impact her heart. Patient verbalize understanding. No further questions or concerns. -ejb    ----- Message from Maryjane Cho sent at 7/9/2020  1:21 PM CDT -----  General phone call:  PATIENT HAVING A CT OF ABDOMEN AT [facility] TODAY AND WANTS TO KNOW IF THE DYE IS SAFE FOR HER HEART, PLEASE CALL  Caller: PATIENT  Primary cardiologist: DR MALDONADO  Detailed reason for call: SEE ABOVE  New or active symptoms? NO  Best phone number: [phone]  Best time to contact: ANY TIME  Ok to leave a detailed message? YES  Device? NO    Additional Info:

## 2021-06-09 NOTE — TELEPHONE ENCOUNTER
Wellness Screening Tool  Symptom Screening:  Do you have one of the following NEW symptoms:    Fever (subjective or >100.0)?  No    A new cough?  No    Shortness of breath?  No     Chills? No     New loss of taste or smell? No     Generalized body aches? No     New persistent headache? No     New sore throat? No     Nausea, vomiting, or diarrhea?  Yes, nausea    Within the past 3 weeks, have you been exposed to someone with a known positive illness below:    COVID-19 (known or suspected)?  No    Chicken pox?  No    Mealses?  No    Pertussis?  No    Patient notified of visitor policy- They may have one person accompany them to their appointment, but they will need to wear a mask and will be screened upon arrival for symptoms: Yes  Pt informed to wear a mask: Yes  Pt notified if they develop any symptoms listed above, prior to their appointment, they are to call the clinic directly at [phone] for further instructions.  Yes  Patient's appointment status: Patient will be seen in clinic as scheduled on 7/8/2020. -nataliia

## 2021-06-09 NOTE — TELEPHONE ENCOUNTER
----- Message -----  From: Eric Grayson  Sent: 6/29/2020   9:14 AM CDT  To: Aleena Araiza RN  Subject: [facility]K/[facility] PT - MEDICATIONS                         General phone call:    Caller: Aury Matute     Primary cardiologist: DONATO/SHERRI     Detailed reason for call: Pt states she would like a call back from the nurse to discuss her medications.     Best phone number: [phone]    Best time to contact: Anytime     Ok to leave a detailed message? Yes     Device? No     Additional Info: Pt has a follow up appt with PTK on 7/8.

## 2021-06-10 NOTE — TELEPHONE ENCOUNTER
Patient reports 3 days ago her PCP switched from Carvedilol to another 5 mg blood pressure tablet (unable see which medication it was as patient is [facility] patient)    Patient states the 5 mg tablet makes her really dizzy and she has to lay down and it gives her nausea and makes her feel like she has the flu. This morning she decided to take half of the tablet instead of the full tablet. States the 2.5 mg worked well. She still was dizzy but for a shorter amount of time and has been able to function. States her BP is 132/63, HR 50. Patient is wondering if she needs to take the other half tablet now. Discussed if she was patient of [facility] that I would page a provider to ask what their recommendations are. Advised for best advice she should call/contact [facility] on reubne provider who is able to view her medical records and medications. Patient verbalized understanding and had no further questions.    China Mendiola RN/[facility] Nurse [facility]      COVID 19 Nurse Triage Plan/Patient Instructions    Please be aware that novel coronavirus (COVID-19) may be circulating in the community. If you develop symptoms such as fever, cough, or SOB or if you have concerns about the presence of another infection including coronavirus (COVID-19), please contact your health care provider or visit [email or web address].     Disposition/Instructions    Call [facility] to speak with provider    Thank you for taking steps to prevent the spread of this virus.  o Limit your contact with others.  o Wear a simple mask to cover your cough.  o Wash your hands well and often.    Resources    [facility]: About COVID-19: [email or web address]    CDC: What to Do If You're Sick: www.cdc.gov/coronavirus/2019-ncov/about/steps-when-sick.html    CDC: Ending Home Isolation: www.cdc.gov/coronavirus/2019-ncov/hcp/disposition-in-home-patients.html     CDC: Caring for Someone:  "www.cdc.gov/coronavirus/2019-ncov/if-you-are-sick/care-for-someone.html     [facility]: Interim Guidance for Hospital Discharge to Home: www.health.[facility].[place]/diseases/coronavirus/hcp/hospdischarge.pdf    [facility] clinical trials (COVID-19 research studies): clinicalaffairs.[facility]/umn-clinical-trials     Below are the COVID-19 hotlines at the [place] Department of Health ([facility]). Interpreters are available.   o For health questions: Call 720-765-3699 or 1-164.837.4918 (7 a.m. to 7 p.m.)  o For questions about schools and childcare: Call 112-348-2982 or 1-703.683.1419 (7 a.m. to 7 p.m.)     Reason for Disposition    Caller has URGENT medication question about med that PCP prescribed and triager unable to answer question    Additional Information    Negative: Drug overdose and nurse unable to answer question    Negative: Caller requesting information not related to medicine    Negative: Caller requesting a prescription for Strep throat and has a positive culture result    Negative: Rash while taking a medication or within 3 days of stopping it    Negative: Immunization reaction suspected    Negative: [1] Asthma and [2] having symptoms of asthma (cough, wheezing, etc)    Negative: MORE THAN A DOUBLE DOSE of a prescription or over-the-counter (OTC) drug    Negative: [1] DOUBLE DOSE (an extra dose or lesser amount) of over-the-counter (OTC) drug AND [2] any symptoms (e.g., dizziness, nausea, pain, sleepiness)    Negative: [1] DOUBLE DOSE (an extra dose or lesser amount) of prescription drug AND [2] any symptoms (e.g., dizziness, nausea, pain, sleepiness)    Negative: Took another person's prescription drug    Negative: [1] DOUBLE DOSE (an extra dose or lesser amount) of prescription drug AND [2] NO symptoms (Exception: a double dose of antibiotics)    Negative: Diabetes drug error or overdose (e.g., insulin or extra dose)    Negative: [1] Request for URGENT new prescription or refill of \"essential\" medication " (i.e., likelihood of harm to patient if not taken) AND [2] triager unable to fill per unit policy    Negative: [1] Prescription not at pharmacy AND [2] was prescribed today by PCP    Negative: Pharmacy calling with prescription questions and triager unable to answer question    Protocols used: MEDICATION QUESTION CALL-A-AH

## 2021-06-16 PROBLEM — I10 ESSENTIAL HYPERTENSION: Status: ACTIVE | Noted: 2019-04-03

## 2021-06-16 PROBLEM — R07.89 ATYPICAL CHEST PAIN: Status: ACTIVE | Noted: 2019-08-21

## 2021-06-16 PROBLEM — I65.29 CAROTID STENOSIS: Status: ACTIVE | Noted: 2019-08-23

## 2021-06-16 PROBLEM — K59.01 SLOW TRANSIT CONSTIPATION: Status: ACTIVE | Noted: 2019-08-31

## 2021-06-16 PROBLEM — R11.0 NAUSEA: Status: ACTIVE | Noted: 2019-10-23

## 2021-06-16 NOTE — TELEPHONE ENCOUNTER
Telephone Encounter by Sakina Salazar RN at 5/17/2019  3:31 PM     Author: Sakina Salazar RN Service: -- Author Type: Registered Nurse    Filed: 5/17/2019  3:32 PM Encounter Date: 5/17/2019 Status: Signed    : Sakina Salazar RN (Registered Nurse)         Left message reporting Dr. Dickson has yet to address and once she does a return call will be made to her.  =========  Message   Received: Today   Message Contents   Maryjane Cho Maureen, RN   Caller: PATIENT (Today,  3:17 PM)             General phone call:   PATIENT STOPPED AT [facility] OFFICE TODAY TO CHECK ON STATUS OF TRAVEL LETTER. SHE WOULD LIKE TO GET THIS TAKEN CARE OF, SENT OVER TO HER TRAVEL INSURANCE, STATING SHE IS UNABLE TO TRAVEL FOR 6 MONTHS DUE TO HEALTH, HEART REASONS, PLEASE CALL   Caller: PATIENT   Primary cardiologist: DR DICKSON   Detailed reason for call: SEE ABOVE   New or active symptoms? NO   Best phone number: [phone]   Best time to contact: ANY TIME   Ok to leave a detailed message? YES   Device? NO

## 2021-06-19 NOTE — LETTER
Letter by Bushra Sánchez RN at      Author: Bushra Sánchez RN Service: -- Author Type: --    Filed:  Encounter Date: 9/24/2019 Status: Signed         September 24, 2019       Patient: Aury Marinelli   YOB: 1955         To whom it may concern:     Aury Marinelli is under my care at [facility].  She underwent coronary artery stent placement in 4/2/19 and had cardiac rehabilitation at that time.  She recently underwent coronary artery bypass graft surgery on 8/26/19 and cardiac rehabilitation following this surgery is medically necessary.      Please call if any questions or concerns.     Sincerely,      Mary Nolan MD

## 2021-06-27 NOTE — PROGRESS NOTES
Progress Notes by Mary Nolan MD at 5/9/2019  8:50 AM     Author: Mary Nolan MD Service: -- Author Type: Physician    Filed: 5/9/2019 12:03 PM Encounter Date: 5/9/2019 Status: Signed    : Mary Nolan MD (Physician)           Click to link to [facility] NOTE    Thank you, Dr. Camejo, for asking us to see Aury Marinelli at the [facility].      Assessment/Recommendations   Assessment:    1.  Coronary artery disease: Severe two-vessel coronary artery disease status post PCI of LAD and OM on 4/2/2019.  Continue on dual antiplatelet therapy for 1 year.  2.  Hyperlipidemia: Continue on high-dose statin therapy and due for repeat fasting lipids.  3.  Carotid artery stenosis: Bilateral carotid stenosis with severe stenosis indicated on the right.  CTA neck for further evaluation and referral to vascular.  4.  Hypertension: Well-controlled    Plan:  1.  Continue dual antiplatelet therapy with Brilinta and aspirin.  If she continues to experience dyspnea could consider changing Brilinta to Plavix.  2.  High-dose statin therapy and repeat fasting lipids.  She reports this is going to be done at with her primary on her next follow-up.  3.  Continue cardiac rehab  Follow-up in a few months       History of Present Illness    Ms. Aury Marinelli is a 63 y.o. female with history of hypertension, dyslipidemia who was recently hospitalized at the end of March with a non-ST elevation myocardial infarction.  She was found to have severe two-vessel coronary artery disease and on 4/2/2019 she underwent ARTURO to proximal/mid LAD and OM.  She was discharged on Brilinta and aspirin.  She reports that she has continued to experience dyspnea on exertion but overall feeling much better.  Denies any recurrent chest pain similar to what she was experiencing before.  If she is having some mild aching over the chest that lasts for a few seconds and  is not related to exertion and feels different compared to the chest pressure she did experience with her event.  Carotid ultrasound on admission showed 50 to 69% stenosis in the left and 70-79% right.  No lower extremity edema orthopnea.    Coronary angiogram 4/2/19    Ost 1st Mrg to 1st Mrg lesion is 99% stenosed.        Mid LAD lesion is 85% stenosed.        Prox LAD lesion is 70% stenosed.        A drug eluting stent was successfully placed.    A stent was successfully placed.    A stent was successfully placed.    The LM vessel was small.    Estimated blood loss was <20 ml.    Ost LAD lesion is 55% stenosed.    Radial access was not utilized for .    The RCA was not injected.     Pt with severe CAD returns for stag,ed PCI     Angiography via left radial  LM small and mild to mod dz'd  LAD small with 50% ostial narrowing; mid 85% at small diagonal narrowing  Circ OM1 osital 99% narrowing   RCA not nijected     PCI  1. Wire both mid circ/OM, PTCA/Stent into OM with ARTURO  2. WIre very small diagonal; difficult to wire into distal LAD, followed by PTCA/stent and post dilated high pressure NC, noted just prox to stent area with narrowing - possible dissection extended from stent or from wire; placed 2nd ARTURO with wire into even smaller more prox diagonal.  Post SAMMIE 3 flow into both LAD/diagonals     Imp/plan  1. CAD s/p PCI to ARTURO to mid LAD and OM - asp 81mg, brillinta (if develops dyspnea or unable to afford would load 300mg clopidogrel and start 75mg daily), statin, cardac rehab, follow up with NP and Dr. Voss in Heart Care and Dr. Camejo in primary care     Echocardiogram 3/31/2019    No previous study for comparison.    Left ventricle ejection fraction is normal. The calculated left ventricular ejection fraction is 68%.    Normal left ventricular size and systolic function.    Normal right ventricular size and systolic function.    Mild mitral regurgitation.       Physical Examination Review of Systems    Vitals:    05/09/19 0852   BP: 118/64   Pulse: 64   Resp: 16     Body mass index is 25.89 kg/m .  Wt Readings from Last 3 Encounters:   05/09/19 137 lb (62.1 kg)   05/06/19 137 lb (62.1 kg)   05/03/19 137 lb 3.2 oz (62.2 kg)       General Appearance:   alert, no apparent distress   HEENT:  no scleral icterus; the mucous membranes are pink and moist                                  Neck: jugular venous pressure normal   Chest: the spine is straight and the chest is symmetric   Lungs:   respirations unlabored; the lungs are clear to auscultation   Cardiovascular:   regular rhythm with normal first and second heart sounds and no murmurs or gallops   Abdomen:  no organomegaly, masses, bruits, or tenderness; bowel sounds are present   Extremities: no cyanosis, clubbing, or edema   Skin: no xanthelasma    General: WNL  Eyes: WNL  Ears/Nose/Throat: WNL  Lungs: Shortness of Breath  Heart: Shortness of Breath with activity  Stomach: WNL  Bladder: Frequent Urination at Night  Muscle/Joints: WNL  Skin: WNL  Nervous System: Dizziness  Mental Health: WNL     Blood: Easy Bruising     Medical History  Surgical History Family History Social History   Past Medical History:   Diagnosis Date   ? Breast injury     hit cabinet on left axillary side   ? Coronary artery disease    ? Fibrocystic breast    ? GERD (gastroesophageal reflux disease)    ? Hyperlipidemia    ? Hypertension    ? IBS (irritable bowel syndrome)     Past Surgical History:   Procedure Laterality Date   ? CV CORONARY ANGIOGRAM N/A 4/1/2019    Procedure: Coronary Angiogram;  Surgeon: Nilo Voss MD;  Location: [facility];  Service: Cardiology   ? CV CORONARY ANGIOGRAM N/A 4/2/2019    Procedure: Coronary Angiogram;  Surgeon: Rick Chilel MD;  Location: [facility];  Service: Cardiology   ? OH LAP,UTERUS,UNLISTED PROCEDURE      Description: Laparoscopy Of Uterus;  Recorded: 01/13/2009;    Family History   Problem Relation Age of  Onset   ? Breast cancer Maternal Grandmother     Social History     Socioeconomic History   ? Marital status:      Spouse name: Not on file   ? Number of children: Not on file   ? Years of education: Not on file   ? Highest education level: Not on file   Occupational History   ? Not on file   Social Needs   ? Financial resource strain: Not on file   ? Food insecurity:     Worry: Not on file     Inability: Not on file   ? Transportation needs:     Medical: Not on file     Non-medical: Not on file   Tobacco Use   ? Smoking status: Never Smoker   ? Smokeless tobacco: Never Used   Substance and Sexual Activity   ? Alcohol use: No     Frequency: Never   ? Drug use: No   ? Sexual activity: Not on file   Lifestyle   ? Physical activity:     Days per week: Not on file     Minutes per session: Not on file   ? Stress: Not on file   Relationships   ? Social connections:     Talks on phone: Not on file     Gets together: Not on file     Attends [employer]ologist service: Not on file     Active member of club or organization: Not on file     Attends meetings of clubs or organizations: Not on file     Relationship status: Not on file   ? Intimate partner violence:     Fear of current or ex partner: Not on file     Emotionally abused: Not on file     Physically abused: Not on file     Forced sexual activity: Not on file   Other Topics Concern   ? Not on file   Social History Narrative   ? Not on file          Medications  Allergies   Current Outpatient Medications   Medication Sig Dispense Refill   ? amLODIPine (NORVASC) 5 MG tablet Take 1 tablet (5 mg total) by mouth daily. 90 tablet 3   ? aspirin 81 MG EC tablet Take 1 tablet (81 mg total) by mouth daily.  0   ? atorvastatin (LIPITOR) 40 MG tablet Take 2 tablets (80 mg total) by mouth at bedtime. 180 tablet 0   ? lisinopril (PRINIVIL,ZESTRIL) 5 MG tablet Take 1 tablet (5 mg total) by mouth daily. 90 tablet 3   ? metoprolol succinate (TOPROL-XL) 25 MG Take 1 tablet (25 mg total)  by mouth daily. 90 tablet 3   ? multivitamin therapeutic tablet Take 1 tablet by mouth every other day.     ? nitroglycerin (NITROSTAT) 0.4 MG SL tablet Place 1 tablet (0.4 mg total) under the tongue every 5 (five) minutes as needed for chest pain. 1 Bottle 1   ? ranitidine (ZANTAC) 75 MG tablet Take 75 mg by mouth daily as needed for heartburn.     ? ticagrelor (BRILINTA) 90 mg Tab Take 1 tablet (90 mg total) by mouth 2 (two) times a day. 180 tablet 3   ? calcium-vitamin D (CALCIUM-VITAMIN D) 500 mg(1,250mg) -200 unit per tablet Take 1 tablet by mouth every other day.       No current facility-administered medications for this visit.       Allergies   Allergen Reactions   ? Codeine Nausea Only   ? Hydrocodone-Acetaminophen Nausea Only         Lab Results    Chemistry/lipid CBC Cardiac Enzymes/BNP/TSH/INR   Lab Results   Component Value Date    CHOL 219 (H) 03/31/2019    HDL 34 (L) 03/31/2019    LDLCALC 166 (H) 03/31/2019    TRIG 94 03/31/2019    CREATININE 0.76 04/04/2019    BUN 11 04/04/2019    K 3.6 04/04/2019     04/04/2019     04/04/2019    CO2 22 04/04/2019    Lab Results   Component Value Date    WBC 10.6 04/04/2019    HGB 11.9 (L) 04/04/2019    HCT 36.1 04/04/2019    MCV 93 04/04/2019     04/04/2019    Lab Results   Component Value Date    TROPONINI 0.17 04/04/2019     (H) 03/30/2019    INR 1.02 03/31/2019

## 2021-06-27 NOTE — PROGRESS NOTES
"Progress Notes by Mary Nolan MD at 7/18/2019  7:50 AM     Author: Mary Nolan MD Service: -- Author Type: Physician    Filed: 7/18/2019  8:49 AM Encounter Date: 7/18/2019 Status: Signed    : Mary Nolan MD (Physician)           Click to link to [facility] HEART [facility] NOTE    Thank you, Dr. Hinton, for asking us to see Aury Marinelli at the [facility].      Assessment/Recommendations   Assessment:    1.  Coronary artery disease: Severe two-vessel coronary artery disease status post PCI of LAD and OM on 4/2/2019.  Continue on dual antiplatelet therapy for 1 year.  We will plan on changing to Plavix from Brilinta due to issues with dyspnea.  2.  Hyperlipidemia: Continue on high-dose statin therapy and due for repeat fasting lipids.  3.  Carotid artery stenosis: CTA demonstrates 60% right ICA stenosis and 20% left ICA stenosis.  4.  Hypertension: Well-controlled     Plan:  1.    Due to problems with shortness of breath will stop Brilinta, load with 300 mg of Plavix and then continue on 75 mg daily of Plavix.  2.    Continue on Crestor 10 mg daily.  She has less muscle aches with this than with Lipitor.  3.  Continue cardiac rehab  4.  Reevaluate carotid disease in a year   follow-up in a few months       History of Present Illness    Ms. Aury Marinelli is a 63 y.o. female with history of hypertension, dyslipidemia and coronary artery disease status post non-ST elevation myocardial infarction in March 2019 found to have severe two-vessel coronary artery disease.  On 4/2/2019 she underwent ARTURO to proximal/mid LAD and OM.  She was discharged on Brilinta and aspirin.    She has continued to experience issues with \"breathlessness\".  Otherwise feeling well with cardiac rehab.  She has noticed improvement in energy level denies any chest pain.  Her symptoms with her heart attack or chest discomfort in the mid chest area with associated " shortness of breath.  She has not had a recurrence of this.  She has noticed shoulder aching in the left side that was worse with Lipitor and improved with changing to Crestor.  The soreness in the left shoulder also improved with Tylenol.    Coronary angiogram 4/2/19    Ost 1st Mrg to 1st Mrg lesion is 99% stenosed.         Mid LAD lesion is 85% stenosed.         Prox LAD lesion is 70% stenosed.         A drug eluting stent was successfully placed.    A stent was successfully placed.    A stent was successfully placed.    The LM vessel was small.    Estimated blood loss was <20 ml.    Ost LAD lesion is 55% stenosed.    Radial access was not utilized for .    The RCA was not injected.     Pt with severe CAD returns for stag,ed PCI     Angiography via left radial  LM small and mild to mod dz'd  LAD small with 50% ostial narrowing; mid 85% at small diagonal narrowing  Circ OM1 osital 99% narrowing   RCA not nijected     PCI  1. Wire both mid circ/OM, PTCA/Stent into OM with ARTURO  2. WIre very small diagonal; difficult to wire into distal LAD, followed by PTCA/stent and post dilated high pressure NC, noted just prox to stent area with narrowing - possible dissection extended from stent or from wire; placed 2nd ARTURO with wire into even smaller more prox diagonal.  Post SAMMIE 3 flow into both LAD/diagonals     Imp/plan  1. CAD s/p PCI to ARTURO to mid LAD and OM - asp 81mg, brillinta (if develops dyspnea or unable to afford would load 300mg clopidogrel and start 75mg daily), statin, cardac rehab, follow up with NP and Dr. Voss in Heart Care and Dr. Camejo in primary care     Echocardiogram 3/31/2019    No previous study for comparison.    Left ventricle ejection fraction is normal. The calculated left ventricular ejection fraction is 68%.    Normal left ventricular size and systolic function.    Normal right ventricular size and systolic function.    Mild mitral regurgitation.          Physical Examination Review of Systems    Vitals:    07/18/19 0746   BP: 110/70   Pulse: 68   Resp: 16     Body mass index is 25.51 kg/m .  Wt Readings from Last 3 Encounters:   07/18/19 135 lb (61.2 kg)   07/17/19 136 lb 3.2 oz (61.8 kg)   07/16/19 136 lb (61.7 kg)       General Appearance:   alert, no apparent distress   HEENT:  no scleral icterus; the mucous membranes are pink and moist                                  Neck: no jvd   Chest: the spine is straight and the chest is symmetric   Lungs:   respirations unlabored; the lungs are clear to auscultation   Cardiovascular:   regular rhythm with normal first and second heart sounds and no murmurs or gallops; carotid, radial, femoral, and posterior tibial pulses are intact and there are no carotid or femoral bruits.   Abdomen:  no organomegaly, masses, bruits, or tenderness; bowel sounds are present   Extremities: no cyanosis, clubbing, or edema   Skin: no xanthelasma    General: WNL  Eyes: WNL  Ears/Nose/Throat: WNL  Lungs: WNL  Heart: Shortness of Breath with activity  Stomach: WNL  Bladder: Frequent Urination at Night  Muscle/Joints: Joint Pain  Skin: WNL  Nervous System: WNL  Mental Health: WNL     Blood: WNL     Medical History  Surgical History Family History Social History   Past Medical History:   Diagnosis Date   ? Breast injury     hit cabinet on left axillary side   ? Coronary artery disease    ? Fibrocystic breast    ? GERD (gastroesophageal reflux disease)    ? Hyperlipidemia    ? Hypertension    ? IBS (irritable bowel syndrome)     Past Surgical History:   Procedure Laterality Date   ? CV CORONARY ANGIOGRAM N/A 4/1/2019    Procedure: Coronary Angiogram;  Surgeon: Nilo Voss MD;  Location: [facility] Lab;  Service: Cardiology   ? CV CORONARY ANGIOGRAM N/A 4/2/2019    Procedure: Coronary Angiogram;  Surgeon: Rick Chilel MD;  Location: [facility];  Service: Cardiology   ? CT LAP,UTERUS,UNLISTED PROCEDURE      Description: Laparoscopy Of Uterus;  Recorded:  01/13/2009;    Family History   Problem Relation Age of Onset   ? Breast cancer Maternal Grandmother     Social History     Socioeconomic History   ? Marital status:      Spouse name: Not on file   ? Number of children: Not on file   ? Years of education: Not on file   ? Highest education level: Not on file   Occupational History   ? Not on file   Social Needs   ? Financial resource strain: Not on file   ? Food insecurity:     Worry: Not on file     Inability: Not on file   ? Transportation needs:     Medical: Not on file     Non-medical: Not on file   Tobacco Use   ? Smoking status: Never Smoker   ? Smokeless tobacco: Never Used   Substance and Sexual Activity   ? Alcohol use: No     Frequency: Never   ? Drug use: No   ? Sexual activity: Not on file   Lifestyle   ? Physical activity:     Days per week: Not on file     Minutes per session: Not on file   ? Stress: Not on file   Relationships   ? Social connections:     Talks on phone: Not on file     Gets together: Not on file     Attends [employer] service: Not on file     Active member of club or organization: Not on file     Attends meetings of clubs or organizations: Not on file     Relationship status: Not on file   ? Intimate partner violence:     Fear of current or ex partner: Not on file     Emotionally abused: Not on file     Physically abused: Not on file     Forced sexual activity: Not on file   Other Topics Concern   ? Not on file   Social History Narrative   ? Not on file          Medications  Allergies   Current Outpatient Medications   Medication Sig Dispense Refill   ? amLODIPine (NORVASC) 5 MG tablet Take 1 tablet (5 mg total) by mouth daily. 90 tablet 3   ? aspirin 81 MG EC tablet Take 1 tablet (81 mg total) by mouth daily.  0   ? calcium-vitamin D (CALCIUM-VITAMIN D) 500 mg(1,250mg) -200 unit per tablet Take 1 tablet by mouth every other day.     ? lisinopril (PRINIVIL,ZESTRIL) 5 MG tablet Take 1 tablet (5 mg total) by mouth daily. 90 tablet  3   ? multivitamin therapeutic tablet Take 1 tablet by mouth every other day.     ? nitroglycerin (NITROSTAT) 0.4 MG SL tablet Place 1 tablet (0.4 mg total) under the tongue every 5 (five) minutes as needed for chest pain. 1 Bottle 1   ? rosuvastatin (CRESTOR) 10 MG tablet Take 10 mg by mouth at bedtime.     ? clopidogrel (PLAVIX) 75 mg tablet Take 1 tablet (75 mg total) by mouth daily. Take 300 mg of plavix the first day and then the next day start 75 mg daily. 90 tablet 3   ? ranitidine (ZANTAC) 75 MG tablet Take 75 mg by mouth daily as needed for heartburn.       No current facility-administered medications for this visit.       Allergies   Allergen Reactions   ? Codeine Nausea Only   ? Hydrocodone-Acetaminophen Nausea Only         Lab Results    Chemistry/lipid CBC Cardiac Enzymes/BNP/TSH/INR   Lab Results   Component Value Date    CHOL 219 (H) 03/31/2019    HDL 34 (L) 03/31/2019    LDLCALC 166 (H) 03/31/2019    TRIG 94 03/31/2019    CREATININE 0.76 04/04/2019    BUN 11 04/04/2019    K 3.6 04/04/2019     04/04/2019     04/04/2019    CO2 22 04/04/2019    Lab Results   Component Value Date    WBC 10.6 04/04/2019    HGB 11.9 (L) 04/04/2019    HCT 36.1 04/04/2019    MCV 93 04/04/2019     04/04/2019    Lab Results   Component Value Date    TROPONINI 0.17 04/04/2019     (H) 03/30/2019    INR 1.02 03/31/2019          Mary Nolan MD  [facility]

## 2021-06-27 NOTE — PROGRESS NOTES
Progress Notes by Lilia López CNP at 4/16/2019  1:30 PM     Author: Lilia López CNP Service: -- Author Type: Nurse Practitioner    Filed: 4/16/2019  3:37 PM Encounter Date: 4/16/2019 Status: Signed    : Lilia López CNP (Nurse Practitioner)                 Click to link to [facility] NOTE      Assessment/Recommendations   1.  Coronary artery disease:  She was hospitalized March 30 - April 3 with a non-STEMI.  PCI on April 2, 2019 with drug-eluting stents to proximal/mid LAD and OM.  Dual antiplatelet therapy is being used with aspirin indefinitely and ticagrelor for 1 year.  We discussed the importance of antiplatelet therapy and talking with her cardiologist prior to stopping these medications for any reason.  Nitro was prescribed today and we discussed use.    Risk factor modification and lifestyle management topics were discussed including managing comorbidities, heart healthy diet and exercise.  She is participating in cardiac rehab.    2.  Dyslipidemia: Aury Marinelli is on high intensity statin therapy with atorvastatin 80 mg daily.  Her dose was increased during hospitalization.  .  I recommended rechecking cholesterol in 2-3 months.  We discussed a diet low in saturated fat, weight loss, and exercise along with medication for better control of cholesterol.     3.  Hypertension: Her blood pressure is well controlled today taking metoprolol, lisinopril, and amlodipine.    4.  Carotid artery stenosis: Carotid ultrasound on April 1, 2019 showed 70-99% stenosis right internal carotid, and 50-69% stenosis left internal carotid.  She has been referred to vascular surgery but has not made an appointment yet.    Aury will follow up with Dr. Nolan on May 9.     History of Present Illness    Aury Marinelli is seen at [facility] for post coronary intervention follow up.  She has a history of hypertension and  dyslipidemia.  She was hospitalized March 30 - April 3 with a non-STEMI.  PCI on April 2, 2019 with drug-eluting stents to proximal/mid LAD and OM.  Dual antiplatelet therapy is being used with aspirin indefinitely and ticagrelor for 1 year.  Echocardiogram on March 31, 2019 showed ejection fraction of 68% and mild mitral regurgitation.  Carotid ultrasound showed 50-69% left and 70-99% right.     She has occasional aches in her chest since PCI but this has improved.  Prior to PCI she had significant chest pressure.  She states that this ache in her chest is a different sensation.  She has occasional fatigue which is improving.  She denies lightheadedness, shortness of breath, dyspnea on exertion and lower extremity edema.      Results for orders placed during the hospital encounter of 04/01/19   Cardiac Catheterization [CATH01] 04/02/2019    Narrative   Ost 1st Mrg to 1st Mrg lesion is 99% stenosed.    Radial access was not utilized for .    Mid LAD lesion is 85% stenosed.    Radial access was not utilized for .    Prox LAD lesion is 70% stenosed.    Radial access was not utilized for .    A drug eluting stent was successfully placed.    A stent was successfully placed.    A stent was successfully placed.    The LM vessel was small.    Estimated blood loss was <20 ml.    Ost LAD lesion is 55% stenosed.    Radial access was not utilized for .    The RCA was not injected.     Pt with severe CAD returns for stag,ed PCI    Angiography via left radial  LM small and mild to mod dz'd  LAD small with 50% ostial narrowing; mid 85% at small diagonal narrowing  Circ OM1 osital 99% narrowing   RCA not nijected    PCI  1. Wire both mid circ/OM, PTCA/Stent into OM with ARTURO  2. WIre very small diagonal; difficult to wire into distal LAD, followed   by PTCA/stent and post dilated high pressure NC, noted just prox to stent   area with narrowing - possible dissection extended from stent or from   wire; placed 2nd ARTURO with wire into  even smaller more prox diagonal.  Post   SAMMIE 3 flow into both LAD/diagonals    Imp/plan  1. CAD s/p PCI to ARTURO to mid LAD and OM - asp 81mg, brillinta (if develops   dyspnea or unable to afford would load 300mg clopidogrel and start 75mg   daily), statin, cardac rehab, follow up with NP and Dr. Voss in Heart   Care and Dr. Camejo in primary care          Physical Examination Review of Systems   Vitals:    04/16/19 1345   BP: 118/62   Pulse: 68   Resp: 16     Body mass index is 25.51 kg/m .  Wt Readings from Last 3 Encounters:   04/16/19 135 lb (61.2 kg)   04/15/19 137 lb 6.4 oz (62.3 kg)   04/12/19 136 lb 12.8 oz (62.1 kg)       General Appearance:     Alert, cooperative and in no acute distress.   ENT/Mouth: membranes moist, no oral lesions or bleeding gums.      EYES:  no scleral icterus, normal conjunctivae   Neck: no carotid bruits or thyromegaly   Chest/Lungs:   lungs are clear to auscultation, no rales or wheezing, respirations unlabored   Cardiovascular:   Regular. Normal first and second heart sounds with no murmurs, rubs, or gallops; the carotid, radial and posterior tibial pulses are intact, no edema bilateral lower extremities    Abdomen:  Soft, nontender, nondistended, bowel sounds present   Extremities: no cyanosis or clubbing   Skin:  Neurologic: warm, dry.  mood and affect are appropriate, alert and oriented x3     Puncture Site: Left radial and Right femoral site is soft with no bruising.  Radial pulses and Pedal pulses intact and symmetrical.  CMS intact.      General: WNL  Eyes: WNL  Ears/Nose/Throat: WNL  Lungs: Shortness of Breath  Heart: Chest Pain, Shortness of Breath with activity  Stomach: Constipation  Bladder: WNL  Muscle/Joints: WNL  Skin: WNL  Nervous System: Daytime Sleepiness  Mental Health: WNL     Blood: WNL     Medical History  Surgical History Family History Social History   Past Medical History:   Diagnosis Date   ? Breast injury     hit cabinet on left axillary side   ?  Coronary artery disease    ? Fibrocystic breast    ? GERD (gastroesophageal reflux disease)    ? Hyperlipidemia    ? Hypertension    ? IBS (irritable bowel syndrome)     Past Surgical History:   Procedure Laterality Date   ? CV CORONARY ANGIOGRAM N/A 4/1/2019    Procedure: Coronary Angiogram;  Surgeon: Nilo Voss MD;  Location: [facility] Cath Lab;  Service: Cardiology   ? CV CORONARY ANGIOGRAM N/A 4/2/2019    Procedure: Coronary Angiogram;  Surgeon: Rick Chilel MD;  Location: [facility] Cath Lab;  Service: Cardiology   ? DE LAP,UTERUS,UNLISTED PROCEDURE      Description: Laparoscopy Of Uterus;  Recorded: 01/13/2009;    Family History   Problem Relation Age of Onset   ? Breast cancer Maternal Grandmother     Social History     Socioeconomic History   ? Marital status:      Spouse name: Not on file   ? Number of children: Not on file   ? Years of education: Not on file   ? Highest education level: Not on file   Occupational History   ? Not on file   Social Needs   ? Financial resource strain: Not on file   ? Food insecurity:     Worry: Not on file     Inability: Not on file   ? Transportation needs:     Medical: Not on file     Non-medical: Not on file   Tobacco Use   ? Smoking status: Never Smoker   ? Smokeless tobacco: Never Used   Substance and Sexual Activity   ? Alcohol use: No     Frequency: Never   ? Drug use: No   ? Sexual activity: Not on file   Lifestyle   ? Physical activity:     Days per week: Not on file     Minutes per session: Not on file   ? Stress: Not on file   Relationships   ? Social connections:     Talks on phone: Not on file     Gets together: Not on file     Attends Buddhist service: Not on file     Active member of club or organization: Not on file     Attends meetings of clubs or organizations: Not on file     Relationship status: Not on file   ? Intimate partner violence:     Fear of current or ex partner: Not on file     Emotionally abused: Not on file      Physically abused: Not on file     Forced sexual activity: Not on file   Other Topics Concern   ? Not on file   Social History Narrative   ? Not on file          Medications  Allergies   Current Outpatient Medications   Medication Sig Dispense Refill   ? amLODIPine (NORVASC) 5 MG tablet Take 1 tablet (5 mg total) by mouth daily. 90 tablet 3   ? aspirin 81 MG EC tablet Take 1 tablet (81 mg total) by mouth daily.  0   ? atorvastatin (LIPITOR) 40 MG tablet Take 2 tablets (80 mg total) by mouth at bedtime. 180 tablet 3   ? calcium-vitamin D (CALCIUM-VITAMIN D) 500 mg(1,250mg) -200 unit per tablet Take 1 tablet by mouth every other day.     ? lisinopril (PRINIVIL,ZESTRIL) 5 MG tablet Take 1 tablet (5 mg total) by mouth daily. 90 tablet 3   ? metoprolol succinate (TOPROL-XL) 25 MG Take 1 tablet (25 mg total) by mouth daily. 90 tablet 3   ? multivitamin therapeutic tablet Take 1 tablet by mouth every other day.     ? ranitidine (ZANTAC) 75 MG tablet Take 75 mg by mouth daily as needed for heartburn.     ? ticagrelor (BRILINTA) 90 mg Tab Take 1 tablet (90 mg total) by mouth 2 (two) times a day. 180 tablet 3   ? nitroglycerin (NITROSTAT) 0.4 MG SL tablet Place 1 tablet (0.4 mg total) under the tongue every 5 (five) minutes as needed for chest pain. 1 Bottle 1     No current facility-administered medications for this visit.       Allergies   Allergen Reactions   ? Codeine Nausea Only   ? Hydrocodone-Acetaminophen Nausea Only         Lab Results    Chemistry CBC BNP   Lab Results   Component Value Date    CREATININE 0.76 04/04/2019    BUN 11 04/04/2019     04/04/2019    K 3.6 04/04/2019     04/04/2019    CO2 22 04/04/2019     Creatinine (mg/dL)   Date Value   04/04/2019 0.76   04/03/2019 0.76   04/02/2019 0.72   03/30/2019 0.75    Lab Results   Component Value Date    WBC 10.6 04/04/2019    HGB 11.9 (L) 04/04/2019    HCT 36.1 04/04/2019    MCV 93 04/04/2019     04/04/2019    Lab Results   Component Value  Date     (H) 03/30/2019     BNP (pg/mL)   Date Value   03/30/2019 137 (H)              Lilia López, [facility]

## 2021-06-28 NOTE — PROGRESS NOTES
"Progress Notes by Mary Nolan MD at 10/4/2019  3:30 PM     Author: Mary Nolan MD Service: -- Author Type: Physician    Filed: 10/4/2019  4:15 PM Encounter Date: 10/4/2019 Status: Signed    : Mary Nolan MD (Physician)           Click to link to [facility] HEART CARE NOTE    Thank you, Dr. Hinton, for asking us to see Aury Marinelli at the [facility].      Assessment/Recommendations   Assessment:    1.  Severe coronary artery disease: History of PCI of LAD and OM on 4/2/2019.  Recent non-ST elevation myocardial infarction status post coronary artery bypass grafting on 8/26/2019.  Doing well with well-healing sternum and increased exercise tolerance without exertional symptoms.  Now off of Lasix.  2.  Dyslipidemia    Plan:  1.  Continue cardiac rehab.  2.  Continue aspirin, Plavix 1 year, low-dose lisinopril and high-dose statin therapy.  3.  Follow-up in a few months       History of Present Illness    Ms. Aury Marinelli is a 63 y.o. female history of coronary artery disease status post PCI of mid LAD and OM on 4/2/2019.  She subsequently complained of dyspnea and was loaded with Plavix and change to 75 mg daily.  She was readmitted to the hospital with a non-ST elevation myocardial infarction and chest pain and found to have thrombotic in-stent restenosis of her mid LAD stent as well as a new left main lesion that was severe.  On 8/26/2019 she underwent coronary artery bypass grafting x3 with LIMA to LAD, SVG to diagonal, SVG to OM.  Surgery went well without complications.  She has started cardiac rehab and has been doing well with this.  She was tried on carvedilol and felt unwell on this.  She reports that she \"felt ill \"and \"could not function\".  She stopped the low-dose carvedilol.  She had been on 3.25 mg twice daily.  She complains of feeling her heartbeat harder after she has large meals.  No exertional chest pain or " breathing difficulty.  Overall feeling better over time.  She has some numbness over her left breast with associated pinpoint sharp discomfort at times that is not exertional.    Echocardiogram 8/22/2019    Left ventricle ejection fraction is normal. The calculated left ventricular ejection fraction is 69%.    Normal left ventricular size.    Left atrial volume is mildly increased.    Normal right ventricular size and systolic function.    No hemodynamically significant valvular heart abnormalities.    When compared to the previous study dated 3/31/2019, no significant change.    Coronary angiogram 8/22/2019    Prox LAD lesion is 90% stenosed.    Ost LM to Mid LM lesion is 90% stenosed.    The RCA was moderate.     Pt with hx of PCI to LAD/Circ 4/1, changed from brillinta to clopidogrel last month due to dyspnea, with two days of angina and non Q wave MI peak troponin ~2, on iv NTG and pain free.  Pt turned down surgery at that time due to pt preference.  Noted small vessels.      Angiography via left radial  LM mid to distal 90%  LAD instent restenosis 90% thrombotic  Circ patent stents  RCA mild dz     Imp/plan  1. ACS with restenosis of mid LAD thrombotic lesion and new LM disease - cont iv NTG, heparin, raise asp 162mg daily, hold on IABP given pain free, raise crestor 40mg, CT surgery aware, stop clopidogrel, echo today and carotid US, adm to ICU       Coronary angiogram 4/2/2019    Ost 1st Mrg to 1st Mrg lesion is 99% stenosed.    Mid LAD lesion is 85% stenosed.    Prox LAD lesion is 70% stenosed.    A drug eluting stent was successfully placed.    A stent was successfully placed.    A stent was successfully placed.    The LM vessel was small.    Estimated blood loss was <20 ml.    Ost LAD lesion is 55% stenosed.    The RCA was not injected.     Pt with severe CAD returns for stag,ed PCI     Angiography via left radial  LM small and mild to mod dz'd  LAD small with 50% ostial narrowing; mid 85% at small  diagonal narrowing  Circ OM1 osital 99% narrowing   RCA not nijected     PCI  1. Wire both mid circ/OM, PTCA/Stent into OM with ARTURO  2. WIre very small diagonal; difficult to wire into distal LAD, followed by PTCA/stent and post dilated high pressure NC, noted just prox to stent area with narrowing - possible dissection extended from stent or from wire; placed 2nd ARTURO with wire into even smaller more prox diagonal.  Post SAMMIE 3 flow into both LAD/diagonals     Imp/plan  1. CAD s/p PCI to ARTURO to mid LAD and OM - asp 81mg, brillinta (if develops dyspnea or unable to afford would load 300mg clopidogrel and start 75mg daily), statin, cardac rehab, follow up with NP and Dr. Voss in Heart Care and Dr. Camejo in primary care     Physical Examination Review of Systems   Vitals:    10/04/19 1524   BP: 122/70   Pulse: 84   Resp: 16     Body mass index is 24.94 kg/m .  Wt Readings from Last 3 Encounters:   10/04/19 132 lb (59.9 kg)   10/04/19 132 lb 3.2 oz (60 kg)   09/30/19 131 lb 8 oz (59.6 kg)       General Appearance:   alert, no apparent distress   HEENT:  no scleral icterus; the mucous membranes are pink and moist                                  Neck: No jvd   Chest:  Incision appears clean dry and intact and well-healed   Lungs:   respirations unlabored; the lungs are clear to auscultation   Cardiovascular:   regular rhythm with normal first and second heart sounds and no murmurs or gallops;  there are no carotid  bruits.   Abdomen:  no organomegaly, masses, bruits, or tenderness; bowel sounds are present   Extremities: no edema   Skin: no xanthelasma    General: WNL  Eyes: WNL  Ears/Nose/Throat: WNL  Lungs: Shortness of Breath  Heart: Chest Pain, Shortness of Breath with activity  Stomach: Constipation  Bladder: WNL  Muscle/Joints: WNL  Skin: WNL  Nervous System: Daytime Sleepiness  Mental Health: WNL     Blood: Easy Bruising     Medical History  Surgical History Family History Social History   Past Medical  History:   Diagnosis Date   ? Breast injury     hit cabinet on left axillary side   ? Coronary artery disease    ? Fibrocystic breast    ? GERD (gastroesophageal reflux disease)    ? Hyperlipidemia    ? Hypertension    ? IBS (irritable bowel syndrome)     Past Surgical History:   Procedure Laterality Date   ? CV CORONARY ANGIOGRAM N/A 4/1/2019    Procedure: Coronary Angiogram;  Surgeon: Nilo Voss MD;  Location: [facility] Cath Lab;  Service: Cardiology   ? CV CORONARY ANGIOGRAM N/A 4/2/2019    Procedure: Coronary Angiogram;  Surgeon: Rick Chilel MD;  Location: [facility] Cath Lab;  Service: Cardiology   ? CV CORONARY ANGIOGRAM N/A 8/22/2019    Procedure: Coronary Angiogram;  Surgeon: Rick Chilel MD;  Location: [facility] Cath Lab;  Service: Cardiology   ? IR PLEURAL DRAINAGE W CATHETER INSERTION  8/27/2019   ? NM LAP,UTERUS,UNLISTED PROCEDURE      Description: Laparoscopy Of Uterus;  Recorded: 01/13/2009;    Family History   Problem Relation Age of Onset   ? Breast cancer Maternal Grandmother     Social History     Socioeconomic History   ? Marital status:      Spouse name: Not on file   ? Number of children: Not on file   ? Years of education: Not on file   ? Highest education level: Not on file   Occupational History   ? Not on file   Social Needs   ? Financial resource strain: Not on file   ? Food insecurity:     Worry: Not on file     Inability: Not on file   ? Transportation needs:     Medical: Not on file     Non-medical: Not on file   Tobacco Use   ? Smoking status: Never Smoker   ? Smokeless tobacco: Never Used   Substance and Sexual Activity   ? Alcohol use: No     Frequency: Never   ? Drug use: No   ? Sexual activity: Not on file   Lifestyle   ? Physical activity:     Days per week: Not on file     Minutes per session: Not on file   ? Stress: Not on file   Relationships   ? Social connections:     Talks on phone: Not on file     Gets together: Not on file      Attends Shinto service: Not on file     Active member of club or organization: Not on file     Attends meetings of clubs or organizations: Not on file     Relationship status: Not on file   ? Intimate partner violence:     Fear of current or ex partner: Not on file     Emotionally abused: Not on file     Physically abused: Not on file     Forced sexual activity: Not on file   Other Topics Concern   ? Not on file   Social History Narrative   ? Not on file          Medications  Allergies   Current Outpatient Medications   Medication Sig Dispense Refill   ? acetaminophen (TYLENOL) 325 MG tablet Take 2 tablets (650 mg total) by mouth every 4 (four) hours as needed for pain or fever (Headache). (Patient taking differently: Take 500 mg by mouth every 4 (four) hours as needed for pain or fever (Headache). 500mg 1-2 tablet PRN      ) 20 tablet 0   ? aspirin 81 MG EC tablet Take 1 tablet (81 mg total) by mouth daily.  0   ? calcium-vitamin D (CALCIUM-VITAMIN D) 500 mg(1,250mg) -200 unit per tablet Take 1 tablet by mouth every other day.     ? clopidogrel (PLAVIX) 75 mg tablet Take 1 tablet (75 mg total) by mouth daily. 90 tablet 3   ? lisinopril (PRINIVIL,ZESTRIL) 2.5 MG tablet Take 1 tablet (2.5 mg total) by mouth daily. 90 tablet 3   ? magnesium hydroxide (MILK OF MAG) 400 mg/5 mL Susp suspension Take 30 mL by mouth daily as needed (Constipation). 1 Bottle 0   ? multivitamin therapeutic tablet Take 1 tablet by mouth every other day.     ? nitroglycerin (NITROSTAT) 0.4 MG SL tablet Place 1 tablet (0.4 mg total) under the tongue every 5 (five) minutes as needed for chest pain. 1 Bottle 1   ? ranitidine (ZANTAC) 75 MG tablet Take 75 mg by mouth daily as needed for heartburn.     ? rosuvastatin (CRESTOR) 10 MG tablet Take 10 mg by mouth at bedtime.     ? traMADol (ULTRAM) 50 mg tablet Take 1 tablet (50 mg total) by mouth every 6 (six) hours as needed for pain. (Patient taking differently: Take 25 mg by mouth every 6 (six)  hours as needed for pain.       ) 20 tablet 0   ? diclofenac sodium (VOLTAREN) 1 % Gel Apply to back over the most tender area 4 times day as needed 1 Tube 0     No current facility-administered medications for this visit.       Allergies   Allergen Reactions   ? Codeine Nausea Only   ? Hydrocodone-Acetaminophen Nausea Only   ? Morphine Nausea Only   ? Succinylcholine      States made her feel terrible         Lab Results    Chemistry/lipid CBC Cardiac Enzymes/BNP/TSH/INR   Lab Results   Component Value Date    CHOL 219 (H) 03/31/2019    HDL 34 (L) 03/31/2019    LDLCALC 166 (H) 03/31/2019    TRIG 94 03/31/2019    CREATININE 0.76 08/31/2019    BUN 13 08/31/2019    K 4.0 08/31/2019     08/31/2019     08/31/2019    CO2 28 08/31/2019    Lab Results   Component Value Date    WBC 10.9 08/31/2019    HGB 9.9 (L) 08/31/2019    HCT 30.4 (L) 08/31/2019    MCV 93 08/31/2019     08/31/2019    Lab Results   Component Value Date    TROPONINI 1.78 (HH) 08/23/2019     08/21/2019    INR 1.29 (H) 08/27/2019          Mary Nolan MD  [facility]

## 2021-06-28 NOTE — PROGRESS NOTES
Progress Notes by Mary Nolan MD at 12/30/2019 12:50 PM     Author: Mary Nolan MD Service: -- Author Type: Physician    Filed: 12/30/2019  1:34 PM Encounter Date: 12/30/2019 Status: Signed    : Mary Nolan MD (Physician)           Thank you, Dr. Hinton, for asking us to see Aury Marinelli at the [facility].      Assessment/Recommendations   Assessment:    1.  Severe coronary artery disease: History of PCI of LAD and OM on 4/2/2019.  Non-ST elevation myocardial infarction status post coronary artery bypass grafting on 8/26/2019 with LIMA to LAD, SVG to diagonal, SVG to OM.  No anginal complaints.  2.  Dyslipidemia, recent LDL 56.  Now well controlled on Crestor  3.  Carotid artery disease: Moderate carotid artery stenosis right ICA     Plan:  1.  continue cardiac diet, regular exercise  2.  Continue aspirin, Plavix 1 year, low-dose lisinopril and high-dose statin therapy.  3.    Repeat carotid ultrasound in 1 year   follow-up in 6 months       History of Present Illness    Ms. Aury Marinelli is a 64 y.o. female  history of coronary artery disease status post PCI of mid LAD and OM on 4/2/2019.  She subsequently complained of dyspnea and was loaded with Plavix in place of Brilinta. She was readmitted to the hospital with a non-ST elevation myocardial infarction and chest pain and found to have thrombotic in-stent restenosis of her mid LAD stent as well as a new left main lesion that was severe.  On 8/26/2019 she underwent coronary artery bypass grafting x3 with LIMA to LAD, SVG to diagonal, SVG to OM.  Surgery went well without complications.  She had reported side effects to metoprolol as well as carvedilol.  Currently tolerating aspirin, Plavix, lisinopril and Crestor.  Sternum is healing.  She did note that she was having some nausea after she stopped tramadol.  She underwent a Lexiscan nuclear stress test in October that was negative for  inducible ischemia showed a left ventricular ejection fraction greater than 70%.       Physical Examination Review of Systems   Vitals:    12/30/19 1257   BP: 102/60   Pulse: 68   Resp: 16     Body mass index is 24.68 kg/m .  Wt Readings from Last 3 Encounters:   12/30/19 130 lb 9.6 oz (59.2 kg)   11/08/19 126 lb (57.2 kg)   10/24/19 130 lb 1.6 oz (59 kg)       General Appearance:   alert, no apparent distress   HEENT:  no scleral icterus; the mucous membranes are pink and moist                                  Neck: No jvd   Chest: the spine is straight and the chest is symmetric   Lungs:   respirations unlabored; the lungs are clear to auscultation   Cardiovascular:   regular rhythm with normal first and second heart sounds and no murmurs or gallops   Abdomen:  no organomegaly, masses, bruits, or tenderness; bowel sounds are present   Extremities: no cyanosis, clubbing, or edema   Skin: no xanthelasma    General: WNL  Eyes: WNL  Ears/Nose/Throat: WNL  Lungs: Shortness of Breath  Heart: WNL  Stomach: WNL  Bladder: WNL  Muscle/Joints: Joint Pain  Skin: WNL  Nervous System: WNL  Mental Health: WNL     Blood: WNL     Medical History  Surgical History Family History Social History   Past Medical History:   Diagnosis Date   ? Breast injury     hit cabinet on left axillary side   ? Coronary artery disease    ? Fibrocystic breast    ? GERD (gastroesophageal reflux disease)    ? Hyperlipidemia    ? Hypertension    ? IBS (irritable bowel syndrome)     Past Surgical History:   Procedure Laterality Date   ? CV CORONARY ANGIOGRAM N/A 4/1/2019    Procedure: Coronary Angiogram;  Surgeon: Nilo Voss MD;  Location: [facility] Cath Lab;  Service: Cardiology   ? CV CORONARY ANGIOGRAM N/A 4/2/2019    Procedure: Coronary Angiogram;  Surgeon: Rick Chilel MD;  Location: [facility] Cath Lab;  Service: Cardiology   ? CV CORONARY ANGIOGRAM N/A 8/22/2019    Procedure: Coronary Angiogram;  Surgeon: Rick Chilel  MD Chris;  Location: [facility] Cath Lab;  Service: Cardiology   ? IR PLEURAL DRAINAGE W CATHETER INSERTION  8/27/2019   ? NM LAP,UTERUS,UNLISTED PROCEDURE      Description: Laparoscopy Of Uterus;  Recorded: 01/13/2009;    Family History   Problem Relation Age of Onset   ? Breast cancer Maternal Grandmother     Social History     Socioeconomic History   ? Marital status:      Spouse name: Not on file   ? Number of children: Not on file   ? Years of education: Not on file   ? Highest education level: Not on file   Occupational History   ? Not on file   Social Needs   ? Financial resource strain: Not on file   ? Food insecurity:     Worry: Not on file     Inability: Not on file   ? Transportation needs:     Medical: Not on file     Non-medical: Not on file   Tobacco Use   ? Smoking status: Never Smoker   ? Smokeless tobacco: Never Used   Substance and Sexual Activity   ? Alcohol use: No     Frequency: Never   ? Drug use: No   ? Sexual activity: Not on file   Lifestyle   ? Physical activity:     Days per week: Not on file     Minutes per session: Not on file   ? Stress: Not on file   Relationships   ? Social connections:     Talks on phone: Not on file     Gets together: Not on file     Attends Adventist service: Not on file     Active member of club or organization: Not on file     Attends meetings of clubs or organizations: Not on file     Relationship status: Not on file   ? Intimate partner violence:     Fear of current or ex partner: Not on file     Emotionally abused: Not on file     Physically abused: Not on file     Forced sexual activity: Not on file   Other Topics Concern   ? Not on file   Social History Narrative   ? Not on file          Medications  Allergies   Current Outpatient Medications   Medication Sig Dispense Refill   ? acetaminophen (TYLENOL) 325 MG tablet Take 2 tablets (650 mg total) by mouth every 4 (four) hours as needed for pain or fever (Headache). (Patient taking differently: Take  500 mg by mouth every 4 (four) hours as needed for pain or fever (Headache). 500mg 1-2 tablet PRN      ) 20 tablet 0   ? aspirin 81 MG EC tablet Take 1 tablet (81 mg total) by mouth daily.  0   ? calcium-vitamin D (CALCIUM-VITAMIN D) 500 mg(1,250mg) -200 unit per tablet Take 1 tablet by mouth every other day.     ? clopidogrel (PLAVIX) 75 mg tablet Take 1 tablet (75 mg total) by mouth daily. 90 tablet 3   ? lisinopril (PRINIVIL,ZESTRIL) 2.5 MG tablet Take 1 tablet (2.5 mg total) by mouth daily. 90 tablet 3   ? multivitamin therapeutic tablet Take 1 tablet by mouth every other day.     ? rosuvastatin (CRESTOR) 10 MG tablet Take 10 mg by mouth at bedtime.     ? diclofenac sodium (VOLTAREN) 1 % Gel Apply to back over the most tender area 4 times day as needed 1 Tube 0   ? lidocaine 4 % patch Place 1 patch on the skin daily as needed for pain. Remove and discard patch with 12 hours or as directed by MD.     ? magnesium hydroxide (MILK OF MAG) 400 mg/5 mL Susp suspension Take 30 mL by mouth daily as needed (Constipation). 1 Bottle 0   ? metoclopramide (REGLAN) 10 MG tablet Take 1 tablet (10 mg total) by mouth every 6 (six) hours. 30 tablet 0   ? nitroglycerin (NITROSTAT) 0.4 MG SL tablet DISSOLVE 1 TABLET UNDER THE TONGUE EVERY 5 MINUTES AS  NEEDED FOR CHEST PAIN MAX 3 TABL IN 15 MIN CALL 911 IF  CHEST PAIN PERSISTS 2 Bottle 2   ? ondansetron (ZOFRAN) 8 MG tablet Take 1 tablet (8 mg total) by mouth every 8 (eight) hours as needed. 12 tablet 0   ? ranitidine (ZANTAC) 75 MG tablet Take 75 mg by mouth daily as needed for heartburn.     ? traMADol (ULTRAM) 50 mg tablet Take 25 mg by mouth 2 (two) times a day.       No current facility-administered medications for this visit.       Allergies   Allergen Reactions   ? Beta-Blockers (Beta-Adrenergic Blocking Agts) Other (See Comments)     Causes flu like symptoms   ? Codeine Nausea Only   ? Hydrocodone-Acetaminophen Nausea Only   ? Iron (Ferrous Sulfate) [Ferrous Sulfate]  Diarrhea   ? Morphine Nausea Only   ? Succinylcholine      States made her feel terrible         Lab Results    Chemistry/lipid CBC Cardiac Enzymes/BNP/TSH/INR   Lab Results   Component Value Date    CHOL 219 (H) 03/31/2019    HDL 34 (L) 03/31/2019    LDLCALC 166 (H) 03/31/2019    TRIG 94 03/31/2019    CREATININE 0.73 11/08/2019    BUN 9 11/08/2019    K 3.8 11/08/2019     11/08/2019     11/08/2019    CO2 23 11/08/2019    Lab Results   Component Value Date    WBC 8.8 11/08/2019    HGB 11.6 (L) 11/08/2019    HCT 36.6 11/08/2019    MCV 95 11/08/2019     11/08/2019    Lab Results   Component Value Date    TROPONINI 0.01 11/08/2019     08/21/2019    INR 1.29 (H) 08/27/2019

## 2021-06-29 NOTE — PROGRESS NOTES
"Progress Notes by Mary Nolan MD at 5/4/2020  8:50 AM     Author: Mary Nolan MD Service: -- Author Type: Physician    Filed: 5/4/2020 11:05 AM Encounter Date: 5/4/2020 Status: Signed    : Mary Nolan MD (Physician)           The patient has been notified of following:     \"This telephone visit will be conducted via a call between you and your physician/provider. We have found that certain health care needs can be provided without the need for a physical exam.  This service lets us provide the care you need with a phone conversation.  If a prescription is necessary we can send it directly to your pharmacy.  If lab work is needed we can place an order for that and you can then stop by our lab to have the test done at a later time. If during the course of the call the physician/provider feels a telephone visit is not appropriate, you will not be charged for this service.\" Verbal consent has been obtained for this service by care team member:         HEART CARE PHONE ENCOUNTER        The patient has chosen to have the visit conducted as a telephone visit, to reduce risk of exposure given the current status of Coronavirus in our community. This telephone visit is being conducted via a call between the patient and physician/provider. Health care needs are being provided without a physical exam.     Assessment/Recommendations   Assessment:    1.  Severe coronary artery disease: History of PCI of LAD and OM on 4/2/2019.  Non-ST elevation myocardial infarction status post coronary artery bypass grafting on 8/26/2019 with LIMA to LAD, SVG to diagonal, SVG to OM.  Recent admission for NSTEMI with angiogram showing patient LIMA to LAD, patient SVG to OM and closed SVG to diagonal.  T.O circumflex with small sized distal circumflex and diagonal small in size as well so medical management recommended.  Patient reports feeling unwell after taking her new meds, diltiazem and imdur. " "  2.  Dyslipidemia, recent LDL 56.  Now well controlled on Crestor  3.  Carotid artery disease: Moderate carotid artery stenosis right ICA     Plan:  1.  continue cardiac diet, regular exercise  2.  Continue aspirin, Plavix  3. Continue lower dose diltiazem  4. Change imdur 30 to isordil 5 mg three times daily to see if lower dose helps with her symptoms  5. Follow up 2 months        Follow Up Plan: Follow up in 2 months  I have reviewed the note as documented.  This accurately captures the substance of my conversation with the patient.    Total time of call between patient and provider was 16 minutes   Start Time:8:52a  Stop Time:9:08a       History of Present Illness/Subjective    Aury Marinelli is a 64 y.o. female who is being evaluated via a billable telephone visit.  She has history of coronary artery disease status post CABG status post recent admission for NSTEMI. Angiogram showed occluded SVG to small diagonal that was receiving retrograde flow from patent LIMA and closed circumflex with patent SVG to OM.  She was admitted with chest pressure.  Today she denies any further chest discomfort.  She was not feeling well with the new medications diltiazem and imdur.  Diltiazem was decreased to 120 mg from 240 mg .  She still feels like she has a \"caffeine rush\" after she takes her meds with her heart beating harder.     Coronary Angiogram 4/24/20  Conclusion       Prox LAD lesion is 90% stenosed.    The RCA was moderate.    SVG to OM is widely patent    LIMA to LAD widely patent    SVG to diagonal occluded, small caliber branch is filling retrograde from JANET through LAD    Ramus lesion is 50% stenosed.    Mid LM to Ost LAD lesion is 50% stenosed.    2nd Diag lesion is 70% stenosed.    Ost Cx to Prox Cx lesion is 100% stenosed.    1st Mrg lesion is 70% stenosed.    Origin to Dist Graft lesion is 100% stenosed.    There is no aortic valve regurgitation.      Recommendations     ?  Patient given specific " instructions regarding care of arteriotomy site, activity restrictions, signs and symptoms of cardiac or vascular complications and to seek immediate medical evaluation should they occur.  ? Arterial sheath removed from femoral artery with closure device.  ? Femoral angiogram identifies arterial sheath placement suitable for closure device.  ? Risk factor management for atherosclerosis.  ? Medical therapy only for CAD recommended due to suboptimal anatomy for revascularization.  ? SVG to small graft occluded but vessel fille retrograde from LAD with ostial branch stenosis, give its small caliber would not be suitable target for PCI  Ostial LCX OM branch stenosis impedes flow from OM to distal LCX however distal LCX very small caliber.       I have reviewed and updated the patient's Past Medical History, Social History, Family History and Medication List.     Physical Examination not performed given phone encounter Review of Systems                                                Medical History  Surgical History Family History Social History   Past Medical History:   Diagnosis Date   ? Breast injury     hit cabinet on left axillary side   ? Coronary artery disease    ? Fibrocystic breast    ? GERD (gastroesophageal reflux disease)    ? Hyperlipidemia    ? Hypertension    ? IBS (irritable bowel syndrome)     Past Surgical History:   Procedure Laterality Date   ? CV CORONARY ANGIOGRAM N/A 4/1/2019    Procedure: Coronary Angiogram;  Surgeon: Nilo Voss MD;  Location: [facility];  Service: Cardiology   ? CV CORONARY ANGIOGRAM N/A 4/2/2019    Procedure: Coronary Angiogram;  Surgeon: Rick Chilel MD;  Location: [facility];  Service: Cardiology   ? CV CORONARY ANGIOGRAM N/A 8/22/2019    Procedure: Coronary Angiogram;  Surgeon: Rick Chilel MD;  Location: [facility];  Service: Cardiology   ? CV CORONARY ANGIOGRAM N/A 4/24/2020    Procedure: Coronary Angiogram;   Surgeon: Nilo Voss MD;  Location: [facility];  Service: Cardiology   ? CV LEFT HEART CATHETERIZATION WO LEFT VETRICULOGRAM Left 4/24/2020    Procedure: Left Heart Catheterization Without Left Ventriculogram;  Surgeon: Nilo Voss MD;  Location: [facility] Lab;  Service: Cardiology   ? IR PLEURAL DRAINAGE W CATHETER INSERTION  8/27/2019   ? RI LAP,UTERUS,UNLISTED PROCEDURE      Description: Laparoscopy Of Uterus;  Recorded: 01/13/2009;    Family History   Problem Relation Age of Onset   ? Breast cancer Maternal Grandmother     Social History     Socioeconomic History   ? Marital status:      Spouse name: Not on file   ? Number of children: Not on file   ? Years of education: Not on file   ? Highest education level: Not on file   Occupational History   ? Not on file   Social Needs   ? Financial resource strain: Not on file   ? Food insecurity     Worry: Not on file     Inability: Not on file   ? Transportation needs     Medical: Not on file     Non-medical: Not on file   Tobacco Use   ? Smoking status: Never Smoker   ? Smokeless tobacco: Never Used   Substance and Sexual Activity   ? Alcohol use: No     Frequency: Never   ? Drug use: No   ? Sexual activity: Not on file   Lifestyle   ? Physical activity     Days per week: Not on file     Minutes per session: Not on file   ? Stress: Not on file   Relationships   ? Social connections     Talks on phone: Not on file     Gets together: Not on file     Attends [employer] service: Not on file     Active member of club or organization: Not on file     Attends meetings of clubs or organizations: Not on file     Relationship status: Not on file   ? Intimate partner violence     Fear of current or ex partner: Not on file     Emotionally abused: Not on file     Physically abused: Not on file     Forced sexual activity: Not on file   Other Topics Concern   ? Not on file   Social History Narrative   ? Not on file          Medications  Allergies    Current Outpatient Medications   Medication Sig Dispense Refill   ? acetaminophen (TYLENOL) 500 MG tablet Take 500-1,000 mg by mouth every 6 (six) hours as needed.     ? ARNICA TOP Apply topically daily as needed.     ? aspirin 81 MG EC tablet Take 1 tablet (81 mg total) by mouth daily.  0   ? calcium phosphate trib/vit D3 (CITRACAL-D3 GUMMIES ORAL) Take 1 tablet by mouth every other day. Chewable tablets. Alternates with multivitamin product.     ? clopidogreL (PLAVIX) 75 mg tablet Take 1 tablet (75 mg total) by mouth daily. 90 tablet 3   ? diclofenac sodium (VOLTAREN) 1 % Gel Apply to back over the most tender area 4 times day as needed 1 Tube 0   ? diltiazem (CARDIZEM CD) 120 MG 24 hr capsule Take 1 capsule (120 mg total) by mouth daily. 30 capsule 3   ? metoclopramide (REGLAN) 10 MG tablet Take 10 mg by mouth every 6 (six) hours as needed.     ? multivitamin Chew Chew 1 tablet every other day. Gummy product. Alternates with calcium-vitamin D product.     ? nitroglycerin (NITROSTAT) 0.4 MG SL tablet Place 1 tablet (0.4 mg total) under the tongue every 5 (five) minutes as needed for chest pain. 2 Bottle 2   ? rosuvastatin (CRESTOR) 10 MG tablet Take 1 tablet (10 mg total) by mouth at bedtime. 30 tablet 1   ? isosorbide dinitrate (ISORDIL) 5 MG tablet Take 1 tablet (5 mg total) by mouth 3 (three) times a day. 90 tablet 3     No current facility-administered medications for this visit.     Allergies   Allergen Reactions   ? Beta-Blockers (Beta-Adrenergic Blocking Agts) Other (See Comments)     Causes flu like symptoms   ? Codeine Nausea Only   ? Hydrocodone-Acetaminophen Nausea Only   ? Iron (Ferrous Sulfate) [Ferrous Sulfate] Diarrhea   ? Morphine Nausea Only   ? Succinylcholine      States made her feel terrible   ? Tramadol Other (See Comments)     Reaction(s) after stopping tramadol: Shakes/shivers/headache/other symptoms she thought were cardiac symptoms that she found were not.         Lab Results     Chemistry/lipid CBC Cardiac Enzymes/BNP/TSH/INR   Lab Results   Component Value Date    CHOL 113 04/24/2020    HDL 47 (L) 04/24/2020    LDLCALC 55 04/24/2020    TRIG 53 04/24/2020    CREATININE 0.75 04/23/2020    BUN 10 04/23/2020    K 3.7 04/25/2020     04/23/2020     04/23/2020    CO2 22 04/23/2020    Lab Results   Component Value Date    WBC 8.4 04/23/2020    HGB 12.9 04/23/2020    HCT 40.0 04/23/2020    MCV 94 04/23/2020     04/25/2020    Lab Results   Component Value Date    TROPONINI 1.10 (HH) 04/24/2020     (H) 04/23/2020    INR 0.99 04/23/2020        Mary Nolan

## 2021-06-29 NOTE — PROGRESS NOTES
"Progress Notes by Nilo Voss MD at 6/17/2020  3:10 PM     Author: Nilo Voss MD Service: -- Author Type: Physician    Filed: 6/17/2020  3:23 PM Encounter Date: 6/17/2020 Status: Signed    : Nilo Voss MD (Physician)           The patient has been notified of following:     \"This telephone visit will be conducted via a call between you and your physician/provider. We have found that certain health care needs can be provided without the need for a physical exam.  This service lets us provide the care you need with a phone conversation.  If a prescription is necessary we can send it directly to your pharmacy.  If lab work is needed we can place an order for that and you can then stop by our lab to have the test done at a later time. If during the course of the call the physician/provider feels a telephone visit is not appropriate, you will not be charged for this service.\" Verbal consent has been obtained for this service by care team member:         HEART CARE PHONE ENCOUNTER        The patient has chosen to have the visit conducted as a telephone visit, to reduce risk of exposure given the current status of Coronavirus in our community. This telephone visit is being conducted via a call between the patient and physician/provider. Health care needs are being provided without a physical exam.     Assessment/Recommendations   Assessment:    Chest pain noncardiac, suspect musculoskeletal  CAD previous CABG  Deconditioning  HTN  HLD    Plan:  Ibuprofen 400 mg two times a day trial  Continue current cardiac meds.  Follow up Dr Hinton for evaluation chronic pain     Follow Up Plan: Follow up in   I have reviewed the note as documented.  This accurately captures the substance of my conversation with the patient.    Total time of call between patient and provider was 30 minutes   Start Time:300  Stop Time:330       History of Present Illness/Subjective    Aury Marinelli is a 64 y.o. female who is " being evaluated via a billable telephone visit.    Recent discussion 2 weeks ago regarding recurrent chest pain.  Patient with both coronary artery disease as a cause of chest pain in the past and noncardiac chest pain more recent.  Known history coronary disease requiring coronary bypass surgery.  Etiology of coronary disease is unclear suspicion of possible microvascular etiology prompted medical management but has been very intolerant of calcium channel blocker and beta-blocker therapy.  Also has been intolerant of long-acting nitrates.  Since last visit she has had an ER evaluation because of achy pain between her shoulder blades that had persisted throughout the day and some chest pressure.  Patient ruled out in the emergency room EKGs were negative.  Chest x-ray was clear.  And she was released.  Series of calls placed to the cardiology clinic.  [place] Imdur was causing nausea.  Was instructed to discontinue the Imdur.  She continued with Imdur therapy.  Rested increasing her lisinopril to 5 mg twice a day which she felt she had better tolerance for.  Commented on bruising under her left breast.  This apparently has resolved.  She says some tenderness in her back for 5 days.  Most recent LDL from April was 55.    Coronary bypass April 26, 2019.  Vein graft to the OM, vein graft to a diagonal, JANET to the LAD.    Angiogram April 2020 found vein graft to OM and LIMA to LAD patent.  Vein graft to diagonal was occluded.  The diagonal artery remains patent but is a very small caliber and likely graft assertion not successful due to size mismatch.  Is not felt to be a candidate for intervention    Took lisinopril 5 mg and feels fatigue. BP better control, previous 140s and took sl ntg for bp improvement.  BP 120s  Interscapular back pain chronic persistent, breast to shoulder, persists all day, increases with action but never resolved.  Never resolves.  Felt similar to post cabg pain than gradually improved until angio  then recurred.    Exercise minimal, 15 min walk around house, laundry, about all can tolerate due to chest sx.    I have reviewed and updated the patient's Past Medical History, Social History, Family History and Medication List.     Physical Examination not performed given phone encounter Review of Systems                                                Medical History  Surgical History Family History Social History   Past Medical History:   Diagnosis Date   ? Breast injury     hit cabinet on left axillary side   ? Coronary artery disease    ? Fibrocystic breast    ? GERD (gastroesophageal reflux disease)    ? Hyperlipidemia    ? Hypertension    ? IBS (irritable bowel syndrome)     Past Surgical History:   Procedure Laterality Date   ? CV CORONARY ANGIOGRAM N/A 4/1/2019    Procedure: Coronary Angiogram;  Surgeon: Nilo Voss MD;  Location: [facility];  Service: Cardiology   ? CV CORONARY ANGIOGRAM N/A 4/2/2019    Procedure: Coronary Angiogram;  Surgeon: Rick Chilel MD;  Location: [facility];  Service: Cardiology   ? CV CORONARY ANGIOGRAM N/A 8/22/2019    Procedure: Coronary Angiogram;  Surgeon: Rick Chilel MD;  Location: [facility];  Service: Cardiology   ? CV CORONARY ANGIOGRAM N/A 4/24/2020    Procedure: Coronary Angiogram;  Surgeon: Nilo Voss MD;  Location: [facility];  Service: Cardiology   ? CV LEFT HEART CATHETERIZATION WO LEFT VETRICULOGRAM Left 4/24/2020    Procedure: Left Heart Catheterization Without Left Ventriculogram;  Surgeon: Nilo Voss MD;  Location: [facility];  Service: Cardiology   ? IR PLEURAL DRAINAGE W CATHETER INSERTION  8/27/2019   ? AR LAP,UTERUS,UNLISTED PROCEDURE      Description: Laparoscopy Of Uterus;  Recorded: 01/13/2009;    Family History   Problem Relation Age of Onset   ? Breast cancer Maternal Grandmother     Social History     Socioeconomic History   ? Marital status:       Spouse name: Not on file   ? Number of children: Not on file   ? Years of education: Not on file   ? Highest education level: Not on file   Occupational History   ? Not on file   Social Needs   ? Financial resource strain: Not on file   ? Food insecurity     Worry: Not on file     Inability: Not on file   ? Transportation needs     Medical: Not on file     Non-medical: Not on file   Tobacco Use   ? Smoking status: Never Smoker   ? Smokeless tobacco: Never Used   Substance and Sexual Activity   ? Alcohol use: No     Frequency: Never   ? Drug use: No   ? Sexual activity: Not on file   Lifestyle   ? Physical activity     Days per week: Not on file     Minutes per session: Not on file   ? Stress: Not on file   Relationships   ? Social connections     Talks on phone: Not on file     Gets together: Not on file     Attends Sikhism service: Not on file     Active member of club or organization: Not on file     Attends meetings of clubs or organizations: Not on file     Relationship status: Not on file   ? Intimate partner violence     Fear of current or ex partner: Not on file     Emotionally abused: Not on file     Physically abused: Not on file     Forced sexual activity: Not on file   Other Topics Concern   ? Not on file   Social History Narrative   ? Not on file          Medications  Allergies   Current Outpatient Medications   Medication Sig Dispense Refill   ? acetaminophen (TYLENOL) 500 MG tablet Take 500-1,000 mg by mouth every 6 (six) hours as needed.     ? ARNICA TOP Apply topically daily as needed.     ? aspirin 81 MG EC tablet Take 1 tablet (81 mg total) by mouth daily.  0   ? calcium phosphate trib/vit D3 (CITRACAL-D3 GUMMIES ORAL) Take 1 tablet by mouth every other day. Chewable tablets. Alternates with multivitamin product.     ? clopidogreL (PLAVIX) 75 mg tablet Take 1 tablet (75 mg total) by mouth daily. 90 tablet 3   ? diclofenac sodium (VOLTAREN) 1 % Gel Apply to back over the most tender area 4 times  day as needed 1 Tube 0   ? famotidine (PEPCID) 20 MG tablet Take 1 tablet (20 mg total) by mouth 2 (two) times a day. 30 tablet 0   ? lisinopriL (PRINIVIL,ZESTRIL) 2.5 MG tablet Take 1 tablet (2.5 mg total) by mouth 2 (two) times a day. 60 tablet 3   ? LORazepam (ATIVAN) 1 MG tablet Take 1 tablet (1 mg total) by mouth every 8 (eight) hours as needed (anxiety and tremulousness). 10 tablet 0   ? multivitamin Chew Chew 1 tablet every other day. Gummy product. Alternates with calcium-vitamin D product.     ? nitroglycerin (NITROSTAT) 0.4 MG SL tablet Place 1 tablet (0.4 mg total) under the tongue every 5 (five) minutes as needed for chest pain. 2 Bottle 2   ? potassium chloride (K-DUR,KLOR-CON) 20 MEQ tablet Take 1 tablet (20 mEq total) by mouth daily. 3 tablet 0   ? rosuvastatin (CRESTOR) 10 MG tablet Take 1 tablet (10 mg total) by mouth at bedtime. 30 tablet 1     No current facility-administered medications for this visit.     Allergies   Allergen Reactions   ? Beta-Blockers (Beta-Adrenergic Blocking Agts) Other (See Comments)     Causes flu like symptoms   ? Codeine Nausea Only   ? Hydrocodone-Acetaminophen Nausea Only   ? Iron (Ferrous Sulfate) [Ferrous Sulfate] Diarrhea   ? Morphine Nausea Only   ? Succinylcholine      States made her feel terrible   ? Tramadol Other (See Comments)     Reaction(s) after stopping tramadol: Shakes/shivers/headache/other symptoms she thought were cardiac symptoms that she found were not.         Lab Results    Chemistry/lipid CBC Cardiac Enzymes/BNP/TSH/INR   Lab Results   Component Value Date    CHOL 113 04/24/2020    HDL 47 (L) 04/24/2020    LDLCALC 55 04/24/2020    TRIG 53 04/24/2020    CREATININE 0.72 06/08/2020    BUN 10 06/08/2020    K 3.7 06/08/2020     06/08/2020     06/08/2020    CO2 24 06/08/2020    Lab Results   Component Value Date    WBC 8.8 06/08/2020    HGB 11.8 (L) 06/08/2020    HCT 36.9 06/08/2020    MCV 95 06/08/2020     06/08/2020    Lab Results    Component Value Date    TROPONINI 0.01 06/08/2020    BNP 98 05/28/2020    INR 0.99 04/23/2020        Nilo Voss

## 2021-06-29 NOTE — PROGRESS NOTES
Progress Notes by Nilo Voss MD at 7/8/2020  8:20 AM     Author: Nilo Voss MD Service: -- Author Type: Physician    Filed: 7/8/2020  2:31 PM Encounter Date: 7/8/2020 Status: Signed    : Nilo Voss MD (Physician)           Ms. Aury Marinelli is referred by Dr. Hinton to the [facility] Care team to evaluate multiple symptoms.      Assessment/Recommendations   Assessment:    1.  Chest pains likely musculoskeletal I suspect related to her surgery from ICU with some exacerbation.  The character of the pain is distinctly nonanginal  2.  Fatigue weakness and exercise intolerance suspect a significant physical deconditioning.  Encouraged the patient to do to regular physical activity walking at least 1/2-hour a day.  3.  Hypertension some variability in home measurements but generally her blood pressures been well controlled.  She wants to pursue lisinopril 2.5 twice a day and if this seems to control her pressure will continue with this level.  4.  Cold intolerance we will check a TSH level today  5.  Nausea, abdominal symptoms.  Pursuing CT angiogram abdomen and consider GI evaluation deferred to primary care.    Plan:  1.  Continue lisinopril at current dosing  2.  Encouraged regular physical activity to improve endurance  3.  Check TSH       History of Present Illness/Subjective    Ms. Aury Marinelli is a 64 y.o. female with history of coronary artery disease.  Patient was recently seen on June 17 time virtual visit by myself.  She has had a history of chronic chest pains.  Known history of coronary disease and previous bypass surgery and extensive evaluation this past year because her recurrent chest pains.  One point hypothesis that that she may have microvascular ischemia medical management was performed long-acting nitrates were also suggested she is intolerant  Coronary angiogram was performed in April found negative vessel coronary disease as previously preoperatively.   LIMA to the LAD is patent.  Vein graft to the OM is patent.  Vein graft to a very small diagonal artery was occluded.  However the diagonal branch distal retrograde.  Moderate disease is identified elsewhere but no anatomy suggested to targets for revascularization.  Her bypass surgery was on August 22 of last year    Stress nuclear study in October of last year revealed no evidence for ischemia or infarction and normal left ventricular function  On evaluation today she complains of multiple symptoms of fatigue weakness but no dizziness lightheadedness or syncope.  Sharp stabbing pains in the anterior parasternal area particular over the left side but no anginal symptoms.  Breathing is been stable.  Profound physical limitations fatigues early with activity.  No edema.  Does not perform regular physical activity.  States of the last 2 weeks has been mostly at bedrest.  Complains of cold all the time.  Tolerating medications has some variability in blood pressure sometimes is ones 160 but generally her blood pressure seems to be in the 1 10-1 20 range.  Some easy bruising but no bleeding or bleeding complications.    ECG: Personally reviewed. normal EKG, normal sinus rhythm, unchanged from previous tracings.    ECHOCARDIOGRAM:      Physical Examination Review of Systems   Vitals:    07/08/20 0826   BP: 118/80   Pulse: 72   Resp: 12   SpO2: 100%     Body mass index is 24.8 kg/m .  Wt Readings from Last 5 Encounters:   07/08/20 131 lb 6 oz (59.6 kg)   06/17/20 133 lb 8 oz (60.6 kg)   06/08/20 132 lb (59.9 kg)   06/02/20 132 lb (59.9 kg)   05/29/20 133 lb 11.2 oz (60.6 kg)     BP Readings from Last 5 Encounters:   07/08/20 118/80   06/17/20 134/70   06/08/20 146/69   06/02/20 118/73   05/29/20 135/67     Pulse Readings from Last 5 Encounters:   07/08/20 72   06/17/20 70   06/08/20 67   06/02/20 67   05/29/20 74       General:   Alert, appears stated age and cooperative  normocephalic, without obvious abnormality    ENT/Mouth: Membranes moist, no oral lesions or bleeding gums.      EYES:  No scleral icterus, normal conjunctivae   Neck: No carotid bruits or thyromegaly   Chest/Lungs:   Lungs are clear to auscultation, no rales or wheezing,    Cardiovascular:   Regular. Normal first and second heart sounds with no murmurs, rubs, or gallops; No edema bilaterally    Abdomen:  Normal bowel tones   Extremities: No cyanosis or clubbing, pulses intact   Skin: Color, texture normal.    Neurologic: No focal neurologic deficits          General: Weight Loss  Eyes: WNL  Ears/Nose/Throat: WNL  Lungs: WNL  Heart: Arm Pain  Stomach: Nausea  Bladder: Frequent Urination at Night  Muscle/Joints: WNL  Skin: WNL  Nervous System: Dizziness  Mental Health: WNL     Blood: Easy Bruising     Medical History  Surgical History Family History Social History   Past Medical History:   Diagnosis Date   ? Breast injury     hit cabinet on left axillary side   ? Coronary artery disease    ? Fibrocystic breast    ? GERD (gastroesophageal reflux disease)    ? Hyperlipidemia    ? Hypertension    ? IBS (irritable bowel syndrome)     Past Surgical History:   Procedure Laterality Date   ? CV CORONARY ANGIOGRAM N/A 4/1/2019    Procedure: Coronary Angiogram;  Surgeon: Nilo Voss MD;  Location: [facility] Cath Lab;  Service: Cardiology   ? CV CORONARY ANGIOGRAM N/A 4/2/2019    Procedure: Coronary Angiogram;  Surgeon: Rick Chilel MD;  Location: [facility] Cath Lab;  Service: Cardiology   ? CV CORONARY ANGIOGRAM N/A 8/22/2019    Procedure: Coronary Angiogram;  Surgeon: Rick Chilel MD;  Location: [facility] Cath Lab;  Service: Cardiology   ? CV CORONARY ANGIOGRAM N/A 4/24/2020    Procedure: Coronary Angiogram;  Surgeon: Nilo Voss MD;  Location: [facility] Cath Lab;  Service: Cardiology   ? CV LEFT HEART CATHETERIZATION WO LEFT VETRICULOGRAM Left 4/24/2020    Procedure: Left Heart Catheterization Without Left Ventriculogram;   Surgeon: Nilo Voss MD;  Location: [facility] Cath Lab;  Service: Cardiology   ? IR PLEURAL DRAINAGE W CATHETER INSERTION  8/27/2019   ? ME LAP,UTERUS,UNLISTED PROCEDURE      Description: Laparoscopy Of Uterus;  Recorded: 01/13/2009;    Family History   Problem Relation Age of Onset   ? Breast cancer Maternal Grandmother     Social History     Socioeconomic History   ? Marital status:      Spouse name: Not on file   ? Number of children: Not on file   ? Years of education: Not on file   ? Highest education level: Not on file   Occupational History   ? Not on file   Social Needs   ? Financial resource strain: Not on file   ? Food insecurity     Worry: Not on file     Inability: Not on file   ? Transportation needs     Medical: Not on file     Non-medical: Not on file   Tobacco Use   ? Smoking status: Never Smoker   ? Smokeless tobacco: Never Used   Substance and Sexual Activity   ? Alcohol use: No     Frequency: Never   ? Drug use: No   ? Sexual activity: Not on file   Lifestyle   ? Physical activity     Days per week: Not on file     Minutes per session: Not on file   ? Stress: Not on file   Relationships   ? Social connections     Talks on phone: Not on file     Gets together: Not on file     Attends Muslim service: Not on file     Active member of club or organization: Not on file     Attends meetings of clubs or organizations: Not on file     Relationship status: Not on file   ? Intimate partner violence     Fear of current or ex partner: Not on file     Emotionally abused: Not on file     Physically abused: Not on file     Forced sexual activity: Not on file   Other Topics Concern   ? Not on file   Social History Narrative   ? Not on file          Medications  Allergies   Current Outpatient Medications   Medication Sig Dispense Refill   ? ARNICA TOP Apply topically daily as needed.     ? aspirin 81 MG EC tablet Take 1 tablet (81 mg total) by mouth daily.  0   ? calcium phosphate trib/vit D3  (CITRACAL-D3 GUMMIES ORAL) Take 1 tablet by mouth every other day. Chewable tablets. Alternates with multivitamin product.     ? clopidogreL (PLAVIX) 75 mg tablet Take 1 tablet (75 mg total) by mouth daily. 90 tablet 3   ? lisinopriL (PRINIVIL,ZESTRIL) 2.5 MG tablet Take 1 tablet (2.5 mg total) by mouth 2 (two) times a day. 60 tablet 3   ? multivitamin Chew Chew 1 tablet every other day. Gummy product. Alternates with calcium-vitamin D product.     ? nitroglycerin (NITROSTAT) 0.4 MG SL tablet Place 1 tablet (0.4 mg total) under the tongue every 5 (five) minutes as needed for chest pain. 2 Bottle 2   ? rosuvastatin (CRESTOR) 10 MG tablet Take 1 tablet (10 mg total) by mouth at bedtime. 30 tablet 1   ? acetaminophen (TYLENOL) 500 MG tablet Take 500-1,000 mg by mouth every 6 (six) hours as needed.     ? diclofenac sodium (VOLTAREN) 1 % Gel Apply to back over the most tender area 4 times day as needed 1 Tube 0   ? famotidine (PEPCID) 20 MG tablet Take 1 tablet (20 mg total) by mouth 2 (two) times a day. (Patient taking differently: Take 20 mg by mouth 2 (two) times a day as needed. ) 30 tablet 0   ? LORazepam (ATIVAN) 1 MG tablet Take 1 tablet (1 mg total) by mouth every 8 (eight) hours as needed (anxiety and tremulousness). 10 tablet 0   ? potassium chloride (K-DUR,KLOR-CON) 20 MEQ tablet Take 1 tablet (20 mEq total) by mouth daily. 3 tablet 0     No current facility-administered medications for this visit.     Allergies   Allergen Reactions   ? Beta-Blockers (Beta-Adrenergic Blocking Agts) Other (See Comments)     Causes flu like symptoms   ? Codeine Nausea Only   ? Hydrocodone-Acetaminophen Nausea Only   ? Iron (Ferrous Sulfate) [Ferrous Sulfate] Diarrhea   ? Morphine Nausea Only   ? Succinylcholine      States made her feel terrible   ? Tramadol Other (See Comments)     Reaction(s) after stopping tramadol: Shakes/shivers/headache/other symptoms she thought were cardiac symptoms that she found were not.         Lab  Results    Chemistry/lipid CBC Cardiac Enzymes/BNP/TSH/INR   Lab Results   Component Value Date    CHOL 113 04/24/2020    HDL 47 (L) 04/24/2020    LDLCALC 55 04/24/2020    TRIG 53 04/24/2020    CREATININE 0.72 06/08/2020    BUN 10 06/08/2020    K 3.7 06/08/2020     06/08/2020     06/08/2020    CO2 24 06/08/2020    Lab Results   Component Value Date    WBC 8.8 06/08/2020    HGB 11.8 (L) 06/08/2020    HCT 36.9 06/08/2020    MCV 95 06/08/2020     06/08/2020    Lab Results   Component Value Date    TROPONINI 0.01 06/08/2020    BNP 98 05/28/2020    INR 0.99 04/23/2020         Clinical evaluation time today including exam 35 minutes.  At least 50% of clinic evaluation time involved in assessment and patient counseling.  Part of this chart was created using a dictation software.  Typographic errors, word substitutions, and grammatical errors may unintentionally occur.    Nilo Voss M.D.  [facility]

## 2021-06-29 NOTE — PROGRESS NOTES
"Progress Notes by Nilo Voss MD at 6/2/2020  8:50 AM     Author: Nilo Voss MD Service: -- Author Type: Physician    Filed: 6/2/2020  9:19 AM Encounter Date: 6/2/2020 Status: Signed    : Nilo Voss MD (Physician)           The patient has been notified of following:     \"This telephone visit will be conducted via a call between you and your physician/provider. We have found that certain health care needs can be provided without the need for a physical exam.  This service lets us provide the care you need with a phone conversation.  If a prescription is necessary we can send it directly to your pharmacy.  If lab work is needed we can place an order for that and you can then stop by our lab to have the test done at a later time. If during the course of the call the physician/provider feels a telephone visit is not appropriate, you will not be charged for this service.\" Verbal consent has been obtained for this service by care team member:         HEART CARE PHONE ENCOUNTER        The patient has chosen to have the visit conducted as a telephone visit, to reduce risk of exposure given the current status of Coronavirus in our community. This telephone visit is being conducted via a call between the patient and physician/provider. Health care needs are being provided without a physical exam.     Assessment/Recommendations   Assessment:    Chest pain noncardiac Many of chest sx atypical for angina and likely musculoskeletal.  Difficult to differentiate from angina.  Angina Unclear if small Dg2 obstruction cause for sx or if microvascular etiology.  Attempts with bblocker abd CCB therapy limited by side effects.  As NTG prn use seems to be controlling would continue with this strategy for now.  HLD  HTN Currently well controlled on home monitor    Plan:  aily imdur with supplemental SL NTG  Potassium change to spinach, bananas and will dc oral pills      Follow Up Plan: Follow up in   I have " reviewed the note as documented.  This accurately captures the substance of my conversation with the patient.    Total time of call between patient and provider was 20 minutes   Start Time: 900  Stop Time:920       History of Present Illness/Subjective    Aury Marinelli is a 64 y.o. female who is being evaluated via a billable telephone visit.    Continues to experience chest pains, nonexertional, seem to occur at night later in day.  Would like to take am and midday dose of imdur for control.  NTG SL helps.  Feels associated with chest pound not dyspnea.  No new sx   I have reviewed and updated the patient's Past Medical History, Social History, Family History and Medication List.     Physical Examination not performed given phone encounter Review of Systems                                                Medical History  Surgical History Family History Social History   Past Medical History:   Diagnosis Date   ? Breast injury     hit cabinet on left axillary side   ? Coronary artery disease    ? Fibrocystic breast    ? GERD (gastroesophageal reflux disease)    ? Hyperlipidemia    ? Hypertension    ? IBS (irritable bowel syndrome)     Past Surgical History:   Procedure Laterality Date   ? CV CORONARY ANGIOGRAM N/A 4/1/2019    Procedure: Coronary Angiogram;  Surgeon: Nilo Voss MD;  Location: [facility] Cath Lab;  Service: Cardiology   ? CV CORONARY ANGIOGRAM N/A 4/2/2019    Procedure: Coronary Angiogram;  Surgeon: Rick Chilel MD;  Location: [facility] Cath Lab;  Service: Cardiology   ? CV CORONARY ANGIOGRAM N/A 8/22/2019    Procedure: Coronary Angiogram;  Surgeon: Rick Chilel MD;  Location: [facility] Cath Lab;  Service: Cardiology   ? CV CORONARY ANGIOGRAM N/A 4/24/2020    Procedure: Coronary Angiogram;  Surgeon: Nilo Voss MD;  Location: [facility] Cath Lab;  Service: Cardiology   ? CV LEFT HEART CATHETERIZATION WO LEFT VETRICULOGRAM Left 4/24/2020    Procedure: Left  Heart Catheterization Without Left Ventriculogram;  Surgeon: Nilo Voss MD;  Location: [facility] Cath Lab;  Service: Cardiology   ? IR PLEURAL DRAINAGE W CATHETER INSERTION  8/27/2019   ? AK LAP,UTERUS,UNLISTED PROCEDURE      Description: Laparoscopy Of Uterus;  Recorded: 01/13/2009;    Family History   Problem Relation Age of Onset   ? Breast cancer Maternal Grandmother     Social History     Socioeconomic History   ? Marital status:      Spouse name: Not on file   ? Number of children: Not on file   ? Years of education: Not on file   ? Highest education level: Not on file   Occupational History   ? Not on file   Social Needs   ? Financial resource strain: Not on file   ? Food insecurity     Worry: Not on file     Inability: Not on file   ? Transportation needs     Medical: Not on file     Non-medical: Not on file   Tobacco Use   ? Smoking status: Never Smoker   ? Smokeless tobacco: Never Used   Substance and Sexual Activity   ? Alcohol use: No     Frequency: Never   ? Drug use: No   ? Sexual activity: Not on file   Lifestyle   ? Physical activity     Days per week: Not on file     Minutes per session: Not on file   ? Stress: Not on file   Relationships   ? Social connections     Talks on phone: Not on file     Gets together: Not on file     Attends Episcopalian service: Not on file     Active member of club or organization: Not on file     Attends meetings of clubs or organizations: Not on file     Relationship status: Not on file   ? Intimate partner violence     Fear of current or ex partner: Not on file     Emotionally abused: Not on file     Physically abused: Not on file     Forced sexual activity: Not on file   Other Topics Concern   ? Not on file   Social History Narrative   ? Not on file          Medications  Allergies   Current Outpatient Medications   Medication Sig Dispense Refill   ? acetaminophen (TYLENOL) 500 MG tablet Take 500-1,000 mg by mouth every 6 (six) hours as needed.     ?  ARNICA TOP Apply topically daily as needed.     ? aspirin 81 MG EC tablet Take 1 tablet (81 mg total) by mouth daily.  0   ? calcium phosphate trib/vit D3 (CITRACAL-D3 GUMMIES ORAL) Take 1 tablet by mouth every other day. Chewable tablets. Alternates with multivitamin product.     ? clopidogreL (PLAVIX) 75 mg tablet Take 1 tablet (75 mg total) by mouth daily. 90 tablet 3   ? diclofenac sodium (VOLTAREN) 1 % Gel Apply to back over the most tender area 4 times day as needed 1 Tube 0   ? isosorbide mononitrate (IMDUR) 30 MG 24 hr tablet Take 15 mg by mouth 2 (two) times a day.      ? multivitamin Chew Chew 1 tablet every other day. Gummy product. Alternates with calcium-vitamin D product.     ? nitroglycerin (NITROSTAT) 0.4 MG SL tablet Place 1 tablet (0.4 mg total) under the tongue every 5 (five) minutes as needed for chest pain. 2 Bottle 2   ? rosuvastatin (CRESTOR) 10 MG tablet Take 1 tablet (10 mg total) by mouth at bedtime. 30 tablet 1   ? potassium chloride (K-DUR,KLOR-CON) 20 MEQ tablet Take 1 tablet (20 mEq total) by mouth daily. 3 tablet 0     No current facility-administered medications for this visit.     Allergies   Allergen Reactions   ? Beta-Blockers (Beta-Adrenergic Blocking Agts) Other (See Comments)     Causes flu like symptoms   ? Codeine Nausea Only   ? Hydrocodone-Acetaminophen Nausea Only   ? Iron (Ferrous Sulfate) [Ferrous Sulfate] Diarrhea   ? Morphine Nausea Only   ? Succinylcholine      States made her feel terrible   ? Tramadol Other (See Comments)     Reaction(s) after stopping tramadol: Shakes/shivers/headache/other symptoms she thought were cardiac symptoms that she found were not.         Lab Results    Chemistry/lipid CBC Cardiac Enzymes/BNP/TSH/INR   Lab Results   Component Value Date    CHOL 113 04/24/2020    HDL 47 (L) 04/24/2020    LDLCALC 55 04/24/2020    TRIG 53 04/24/2020    CREATININE 0.65 05/28/2020    BUN 9 05/28/2020    K 3.3 (L) 05/28/2020     05/28/2020      05/28/2020    CO2 22 05/28/2020    Lab Results   Component Value Date    WBC 9.6 05/28/2020    HGB 11.7 (L) 05/28/2020    HCT 36.1 05/28/2020    MCV 95 05/28/2020     05/28/2020    Lab Results   Component Value Date    TROPONINI 0.05 05/29/2020    BNP 98 05/28/2020    INR 0.99 04/23/2020        Nilo Voss

## 2021-07-21 ENCOUNTER — RECORDS - HEALTHEAST (OUTPATIENT)
Dept: ADMINISTRATIVE | Facility: CLINIC | Age: 66
End: 2021-07-21

## 2021-10-10 ENCOUNTER — HEALTH MAINTENANCE LETTER (OUTPATIENT)
Age: 66
End: 2021-10-10

## 2022-01-30 ENCOUNTER — HEALTH MAINTENANCE LETTER (OUTPATIENT)
Age: 67
End: 2022-01-30

## 2022-09-24 ENCOUNTER — HEALTH MAINTENANCE LETTER (OUTPATIENT)
Age: 67
End: 2022-09-24

## 2023-01-29 ENCOUNTER — HEALTH MAINTENANCE LETTER (OUTPATIENT)
Age: 68
End: 2023-01-29

## 2023-05-08 ENCOUNTER — HEALTH MAINTENANCE LETTER (OUTPATIENT)
Age: 68
End: 2023-05-08

## 2024-07-05 ENCOUNTER — TRANSCRIBE ORDERS (OUTPATIENT)
Dept: OTHER | Age: 69
End: 2024-07-05

## 2024-07-05 DIAGNOSIS — I21.4 NSTEMI (NON-ST ELEVATED MYOCARDIAL INFARCTION) (H): Primary | ICD-10-CM
